# Patient Record
Sex: MALE | Race: WHITE | NOT HISPANIC OR LATINO | Employment: OTHER | URBAN - METROPOLITAN AREA
[De-identification: names, ages, dates, MRNs, and addresses within clinical notes are randomized per-mention and may not be internally consistent; named-entity substitution may affect disease eponyms.]

---

## 2018-04-06 ENCOUNTER — OFFICE VISIT (OUTPATIENT)
Dept: INTERNAL MEDICINE | Facility: CLINIC | Age: 83
End: 2018-04-06
Payer: MEDICARE

## 2018-04-06 VITALS
HEIGHT: 73 IN | HEART RATE: 74 BPM | OXYGEN SATURATION: 97 % | SYSTOLIC BLOOD PRESSURE: 130 MMHG | BODY MASS INDEX: 22.67 KG/M2 | WEIGHT: 171.06 LBS | TEMPERATURE: 98 F | DIASTOLIC BLOOD PRESSURE: 70 MMHG

## 2018-04-06 DIAGNOSIS — J06.9 VIRAL URI WITH COUGH: ICD-10-CM

## 2018-04-06 DIAGNOSIS — Z20.828 EXPOSURE TO INFLUENZA: Primary | ICD-10-CM

## 2018-04-06 DIAGNOSIS — J02.9 PHARYNGITIS, UNSPECIFIED ETIOLOGY: ICD-10-CM

## 2018-04-06 LAB
CTP QC/QA: YES
CTP QC/QA: YES
FLUAV AG NPH QL: NEGATIVE
FLUBV AG NPH QL: NEGATIVE
S PYO RRNA THROAT QL PROBE: NEGATIVE

## 2018-04-06 PROCEDURE — 99999 PR PBB SHADOW E&M-EST. PATIENT-LVL III: CPT | Mod: PBBFAC,,,

## 2018-04-06 PROCEDURE — 96372 THER/PROPH/DIAG INJ SC/IM: CPT | Mod: S$GLB,,, | Performed by: FAMILY MEDICINE

## 2018-04-06 PROCEDURE — 87804 INFLUENZA ASSAY W/OPTIC: CPT | Mod: 59,QW,S$GLB,

## 2018-04-06 PROCEDURE — 87880 STREP A ASSAY W/OPTIC: CPT | Mod: QW,S$GLB,,

## 2018-04-06 PROCEDURE — 99213 OFFICE O/P EST LOW 20 MIN: CPT | Mod: 25,S$GLB,,

## 2018-04-06 RX ORDER — BETAMETHASONE SODIUM PHOSPHATE AND BETAMETHASONE ACETATE 3; 3 MG/ML; MG/ML
6 INJECTION, SUSPENSION INTRA-ARTICULAR; INTRALESIONAL; INTRAMUSCULAR; SOFT TISSUE
Status: COMPLETED | OUTPATIENT
Start: 2018-04-06 | End: 2018-04-06

## 2018-04-06 RX ORDER — WARFARIN SODIUM 5 MG/1
TABLET ORAL
COMMUNITY
Start: 2017-04-20 | End: 2021-11-03

## 2018-04-06 RX ORDER — BENZONATATE 200 MG/1
200 CAPSULE ORAL 3 TIMES DAILY PRN
Qty: 30 CAPSULE | Refills: 0 | Status: SHIPPED | OUTPATIENT
Start: 2018-04-06 | End: 2018-04-16

## 2018-04-06 RX ORDER — CYCLOSPORINE 0.5 MG/ML
EMULSION OPHTHALMIC
COMMUNITY
Start: 2018-02-23 | End: 2018-05-23

## 2018-04-06 RX ADMIN — BETAMETHASONE SODIUM PHOSPHATE AND BETAMETHASONE ACETATE 6 MG: 3; 3 INJECTION, SUSPENSION INTRA-ARTICULAR; INTRALESIONAL; INTRAMUSCULAR; SOFT TISSUE at 10:04

## 2018-04-06 NOTE — PROGRESS NOTES
Subjective:       Patient ID: Claude L Taylor is a 89 y.o. male.    Chief Complaint: Cough and Sore Throat    HPI   Patient presents today with a three-day complaint of a sore throat that he says has almost essentially resolved.  He says that he awaken 3 days ago with a constant moderately intensity burning sensation back of his throat.  He started taking some Amoxil that he had at the house and says that her throat has cleared now.  He voices now some continued nasal congestion and rhinorrhea.  He voices associated burning eyes and a mild intermittent cough that is worse during the day.  He denies any shortness of breath.  He cannot identify exacerbating or mitigating factors.    Review of Systems   Constitutional: Negative for activity change, appetite change, fatigue, fever and unexpected weight change.   HENT: Positive for congestion, rhinorrhea and sore throat. Negative for postnasal drip.    Eyes: Positive for pain.   Respiratory: Positive for cough. Negative for chest tightness, shortness of breath and wheezing.    Cardiovascular: Negative for chest pain, palpitations and leg swelling.   Gastrointestinal: Negative for constipation, diarrhea, nausea and vomiting.   Endocrine: Negative.    Genitourinary: Negative.    Musculoskeletal: Negative.    Skin: Negative for color change.   Allergic/Immunologic: Negative.    Neurological: Negative for dizziness, weakness and light-headedness.   Hematological: Negative.    Psychiatric/Behavioral: Negative for sleep disturbance.         Objective:      Physical Exam   Constitutional: He is oriented to person, place, and time. He appears well-developed and well-nourished. No distress.   HENT:   Head: Normocephalic and atraumatic. Hair is normal.   Right Ear: Hearing, tympanic membrane, external ear and ear canal normal.   Left Ear: Hearing, tympanic membrane, external ear and ear canal normal.   Nose: No mucosal edema, rhinorrhea, nose lacerations, sinus tenderness, nasal  deformity, septal deviation or nasal septal hematoma. No epistaxis.  No foreign bodies. Right sinus exhibits no maxillary sinus tenderness and no frontal sinus tenderness. Left sinus exhibits no maxillary sinus tenderness and no frontal sinus tenderness.   Mouth/Throat: Uvula is midline, oropharynx is clear and moist and mucous membranes are normal.   Eyes: Conjunctivae are normal. Pupils are equal, round, and reactive to light. Right eye exhibits no discharge. Left eye exhibits no discharge.   Neck: Trachea normal, normal range of motion and phonation normal. Neck supple. No tracheal deviation present.   Cardiovascular: Normal rate, regular rhythm and intact distal pulses.  Exam reveals no gallop and no friction rub.    Murmur heard.   Systolic murmur is present with a grade of 2/6   Pulmonary/Chest: Effort normal. No respiratory distress. He has no decreased breath sounds. He has wheezes (mild) in the right upper field, the right middle field, the right lower field, the left upper field, the left middle field and the left lower field. He has no rhonchi. He has no rales. He exhibits no tenderness.   Musculoskeletal: Normal range of motion.   Lymphadenopathy:        Head (right side): No submental, no submandibular, no tonsillar, no preauricular, no posterior auricular and no occipital adenopathy present.        Head (left side): No submental, no submandibular, no tonsillar, no preauricular, no posterior auricular and no occipital adenopathy present.     He has no cervical adenopathy.        Right cervical: No superficial cervical, no deep cervical and no posterior cervical adenopathy present.       Left cervical: No superficial cervical, no deep cervical and no posterior cervical adenopathy present.   Neurological: He is alert and oriented to person, place, and time. He exhibits normal muscle tone. GCS eye subscore is 4. GCS verbal subscore is 5. GCS motor subscore is 6.   Skin: Skin is warm and dry. No rash noted.  He is not diaphoretic. No erythema. No pallor.   Psychiatric: He has a normal mood and affect. His speech is normal and behavior is normal. Judgment and thought content normal.       Assessment:       1. Exposure to influenza    2. Pharyngitis, unspecified etiology    3. Viral URI with cough          Plan:   Exposure to influenza  -     POCT Influenza A/B    Pharyngitis, unspecified etiology  -     POCT rapid strep A    Viral URI with cough  -     betamethasone acetate-betamethasone sodium phosphate injection 6 mg; Inject 1 mL (6 mg total) into the muscle one time.  -     benzonatate (TESSALON) 200 MG capsule; Take 1 capsule (200 mg total) by mouth 3 (three) times daily as needed.  Dispense: 30 capsule; Refill: 0            Disclaimer: This note is prepared using voice recognition software.

## 2018-04-24 ENCOUNTER — TELEPHONE (OUTPATIENT)
Dept: INTERNAL MEDICINE | Facility: CLINIC | Age: 83
End: 2018-04-24

## 2018-04-24 NOTE — TELEPHONE ENCOUNTER
----- Message from Chelsi Stahl sent at 4/24/2018  8:14 AM CDT -----  Contact: spouse  She's calling in regards to pt being worked into the schedule on today 4/24 she stated the pt isn't feeling well pls call back at [phone] (home)

## 2018-04-24 NOTE — TELEPHONE ENCOUNTER
----- Message from Marce Massey sent at 4/24/2018  7:16 AM CDT -----  Contact: wife  Pt is not feeling well.  She states Dr Romo has agreed to be pt's PCP.  Wife was offered a NP appt but refused.  Only wants to see Dr Romo today.

## 2018-04-24 NOTE — TELEPHONE ENCOUNTER
Called and spoke with pts wife. Explained that I do not have any openings today. That he is a new patient to us and I can get him scheduled but as of right now first opening is in June. Scheduled appt for him. Offered to schedule him with someone else today, to check other locations or even with his PCP. She declined. She stated that once she talks to him if they decide to try and see someone else they will call back.

## 2018-05-23 ENCOUNTER — OFFICE VISIT (OUTPATIENT)
Dept: INTERNAL MEDICINE | Facility: CLINIC | Age: 83
End: 2018-05-23
Payer: MEDICARE

## 2018-05-23 VITALS
TEMPERATURE: 98 F | BODY MASS INDEX: 22.21 KG/M2 | HEIGHT: 73 IN | DIASTOLIC BLOOD PRESSURE: 68 MMHG | HEART RATE: 60 BPM | SYSTOLIC BLOOD PRESSURE: 120 MMHG | WEIGHT: 167.56 LBS | OXYGEN SATURATION: 98 %

## 2018-05-23 DIAGNOSIS — N32.81 OAB (OVERACTIVE BLADDER): ICD-10-CM

## 2018-05-23 DIAGNOSIS — I70.0 ATHEROSCLEROSIS OF AORTA: ICD-10-CM

## 2018-05-23 DIAGNOSIS — M62.81 PROXIMAL MUSCLE WEAKNESS: ICD-10-CM

## 2018-05-23 DIAGNOSIS — M54.9 CHRONIC MIDLINE BACK PAIN, UNSPECIFIED BACK LOCATION: ICD-10-CM

## 2018-05-23 DIAGNOSIS — Z95.1 HX OF CABG: ICD-10-CM

## 2018-05-23 DIAGNOSIS — Z00.00 ROUTINE GENERAL MEDICAL EXAMINATION AT A HEALTH CARE FACILITY: ICD-10-CM

## 2018-05-23 DIAGNOSIS — M48.061 SPINAL STENOSIS OF LUMBAR REGION AT MULTIPLE LEVELS: Primary | ICD-10-CM

## 2018-05-23 DIAGNOSIS — K59.09 CHRONIC CONSTIPATION: ICD-10-CM

## 2018-05-23 DIAGNOSIS — M51.37 DDD (DEGENERATIVE DISC DISEASE), LUMBOSACRAL: ICD-10-CM

## 2018-05-23 DIAGNOSIS — G89.29 CHRONIC MIDLINE BACK PAIN, UNSPECIFIED BACK LOCATION: ICD-10-CM

## 2018-05-23 DIAGNOSIS — Z12.5 PROSTATE CANCER SCREENING: ICD-10-CM

## 2018-05-23 PROBLEM — M51.379 DDD (DEGENERATIVE DISC DISEASE), LUMBOSACRAL: Status: ACTIVE | Noted: 2018-05-23

## 2018-05-23 LAB
BILIRUB UR QL STRIP: NEGATIVE
CLARITY UR REFRACT.AUTO: CLEAR
COLOR UR AUTO: YELLOW
GLUCOSE UR QL STRIP: NEGATIVE
HGB UR QL STRIP: NEGATIVE
KETONES UR QL STRIP: NEGATIVE
LEUKOCYTE ESTERASE UR QL STRIP: NEGATIVE
NITRITE UR QL STRIP: NEGATIVE
PH UR STRIP: 5 [PH] (ref 5–8)
PROT UR QL STRIP: NEGATIVE
SP GR UR STRIP: 1.02 (ref 1–1.03)
URN SPEC COLLECT METH UR: NORMAL
UROBILINOGEN UR STRIP-ACNC: 4 EU/DL

## 2018-05-23 PROCEDURE — 99999 PR PBB SHADOW E&M-EST. PATIENT-LVL IV: CPT | Mod: PBBFAC,,, | Performed by: FAMILY MEDICINE

## 2018-05-23 PROCEDURE — 81003 URINALYSIS AUTO W/O SCOPE: CPT

## 2018-05-23 PROCEDURE — 99397 PER PM REEVAL EST PAT 65+ YR: CPT | Mod: 25,S$GLB,, | Performed by: FAMILY MEDICINE

## 2018-05-23 PROCEDURE — 87086 URINE CULTURE/COLONY COUNT: CPT

## 2018-05-23 PROCEDURE — 99214 OFFICE O/P EST MOD 30 MIN: CPT | Mod: 25,S$GLB,, | Performed by: FAMILY MEDICINE

## 2018-05-23 PROCEDURE — 99499 UNLISTED E&M SERVICE: CPT | Mod: S$GLB,,, | Performed by: FAMILY MEDICINE

## 2018-05-23 RX ORDER — OXYBUTYNIN CHLORIDE 5 MG/1
5 TABLET ORAL DAILY
Qty: 90 TABLET | Refills: 3 | Status: SHIPPED | OUTPATIENT
Start: 2018-05-23 | End: 2019-03-27

## 2018-05-23 NOTE — PROGRESS NOTES
"Claude Taylor presented for a follow-up Medicare AWV today. The following components were reviewed and updated:    · Medical history  · Family History  · Social history  · Allergies and Current Medications  · Health Maintenance  · Patient Care Team:  Pt is changing pcps. Prior patient of Dr Lorenzo and then Dr. Stefania Corcoran at [facility]. Other specialists include:   Dr Morfin- derm  Dr Barrios- card  Dr Gorman- GI  Dr Parisi- urology  Dr Castelan- NSG/back/[facility]  Dr Corbin- morphine/back injections.     **See Completed Assessments for Annual Wellness visit with in the encounter summary    · Medicare wellness assessment:  ·   Depression screening  · 1. In the past 2 weeks  has the patient felt down, depressed or hopeless? No  · 2. Over the past 2 weeks as the patient felt little interest or pleasure in doing things?  No  ·  Functional Ability/ Safety Screening  · 1. Was the  Patient's timed up and go test unsteady or longer than 30 seconds? no  · 2. Has the patient needed help with transportation shopping preparing meals housework laundry medications are managing money?  no  · 3.  If the patient's home have rugs in the hallway, back grab bars in the bathroom, or poor lighting? No, not necessary per patient. Using motorized scooter all the time.   · 4.has there been any hearing difficulties?  Yes, has hearing aids.   · Advance Directives:  · 1. Patient does have a living will in place.     Vitals:    05/23/18 1144   BP: 120/68   Pulse: 60   Temp: 98 °F (36.7 °C)   SpO2: 98%   Weight: 76 kg (167 lb 8.8 oz)   Height: 6' 1" (1.854 m)     Body mass index is 22.11 kg/m².   ]      Medicare Annual Wellness Visit, Subsequent   Patient Active Problem List   Diagnosis    Depression    CAD (coronary artery disease)    Mitral valve replaced    Asthma in adult    Hematuria    Chronic anticoagulation    Osteoarthritis    Chronic back pain    Altered bowel habits    Anismus    Colon polyps    Diverticulosis of large intestine " without hemorrhage    Chronic constipation         Provided Claude with a 5-10 year written screening schedule and personal prevention plan. Recommendations were developed using the USPSTF age appropriate recommendations. Education, counseling, and referrals were provided as needed.  After Visit Summary printed and given to patient which includes a list of additional screenings\tests needed.    Health Maintenance   Topic Date Due    Influenza Vaccine  08/01/2018    Colonoscopy  12/14/2020    Lipid Panel  06/20/2021    TETANUS VACCINE  02/14/2023    Zoster Vaccine  Completed    Pneumococcal (65+)  Completed       Follow-up in about 5 weeks (around 6/27/2018), or if symptoms worsen or fail to improve, for f/u PT, and OAB meds.      Izzy Romo MD

## 2018-05-24 LAB — BACTERIA UR CULT: NO GROWTH

## 2018-06-15 ENCOUNTER — LAB VISIT (OUTPATIENT)
Dept: LAB | Facility: HOSPITAL | Age: 83
End: 2018-06-15
Attending: FAMILY MEDICINE
Payer: MEDICARE

## 2018-06-15 ENCOUNTER — PATIENT OUTREACH (OUTPATIENT)
Dept: ADMINISTRATIVE | Facility: HOSPITAL | Age: 83
End: 2018-06-15

## 2018-06-15 DIAGNOSIS — Z12.5 PROSTATE CANCER SCREENING: ICD-10-CM

## 2018-06-15 DIAGNOSIS — G89.29 CHRONIC MIDLINE BACK PAIN, UNSPECIFIED BACK LOCATION: ICD-10-CM

## 2018-06-15 DIAGNOSIS — M54.9 CHRONIC MIDLINE BACK PAIN, UNSPECIFIED BACK LOCATION: ICD-10-CM

## 2018-06-15 DIAGNOSIS — I70.0 ATHEROSCLEROSIS OF AORTA: ICD-10-CM

## 2018-06-15 DIAGNOSIS — K59.09 CHRONIC CONSTIPATION: ICD-10-CM

## 2018-06-15 DIAGNOSIS — N32.81 OAB (OVERACTIVE BLADDER): ICD-10-CM

## 2018-06-15 DIAGNOSIS — M62.81 PROXIMAL MUSCLE WEAKNESS: ICD-10-CM

## 2018-06-15 DIAGNOSIS — M48.061 SPINAL STENOSIS OF LUMBAR REGION AT MULTIPLE LEVELS: ICD-10-CM

## 2018-06-15 DIAGNOSIS — M51.37 DDD (DEGENERATIVE DISC DISEASE), LUMBOSACRAL: ICD-10-CM

## 2018-06-15 LAB
ALBUMIN SERPL BCP-MCNC: 3.6 G/DL
ALP SERPL-CCNC: 59 U/L
ALT SERPL W/O P-5'-P-CCNC: 11 U/L
ANION GAP SERPL CALC-SCNC: 6 MMOL/L
AST SERPL-CCNC: 21 U/L
BASOPHILS # BLD AUTO: 0.06 K/UL
BASOPHILS NFR BLD: 0.9 %
BILIRUB SERPL-MCNC: 2.1 MG/DL
BUN SERPL-MCNC: 10 MG/DL
CALCIUM SERPL-MCNC: 9 MG/DL
CHLORIDE SERPL-SCNC: 108 MMOL/L
CHOLEST SERPL-MCNC: 147 MG/DL
CHOLEST/HDLC SERPL: 2.7 {RATIO}
CO2 SERPL-SCNC: 26 MMOL/L
COMPLEXED PSA SERPL-MCNC: 2.3 NG/ML
CREAT SERPL-MCNC: 0.8 MG/DL
DIFFERENTIAL METHOD: ABNORMAL
EOSINOPHIL # BLD AUTO: 0.2 K/UL
EOSINOPHIL NFR BLD: 2.7 %
ERYTHROCYTE [DISTWIDTH] IN BLOOD BY AUTOMATED COUNT: 14.6 %
ERYTHROCYTE [SEDIMENTATION RATE] IN BLOOD BY WESTERGREN METHOD: 1 MM/HR
EST. GFR  (AFRICAN AMERICAN): >60 ML/MIN/1.73 M^2
EST. GFR  (NON AFRICAN AMERICAN): >60 ML/MIN/1.73 M^2
GLUCOSE SERPL-MCNC: 83 MG/DL
HCT VFR BLD AUTO: 38 %
HDLC SERPL-MCNC: 55 MG/DL
HDLC SERPL: 37.4 %
HGB BLD-MCNC: 12.1 G/DL
IMM GRANULOCYTES # BLD AUTO: 0.02 K/UL
IMM GRANULOCYTES NFR BLD AUTO: 0.3 %
LDLC SERPL CALC-MCNC: 82 MG/DL
LYMPHOCYTES # BLD AUTO: 1.7 K/UL
LYMPHOCYTES NFR BLD: 23.8 %
MCH RBC QN AUTO: 30.9 PG
MCHC RBC AUTO-ENTMCNC: 31.8 G/DL
MCV RBC AUTO: 97 FL
MONOCYTES # BLD AUTO: 0.7 K/UL
MONOCYTES NFR BLD: 9.2 %
NEUTROPHILS # BLD AUTO: 4.4 K/UL
NEUTROPHILS NFR BLD: 63.1 %
NONHDLC SERPL-MCNC: 92 MG/DL
NRBC BLD-RTO: 0 /100 WBC
PLATELET # BLD AUTO: 151 K/UL
PMV BLD AUTO: 11.2 FL
POTASSIUM SERPL-SCNC: 4.2 MMOL/L
PROT SERPL-MCNC: 6.3 G/DL
RBC # BLD AUTO: 3.92 M/UL
SODIUM SERPL-SCNC: 140 MMOL/L
TRIGL SERPL-MCNC: 50 MG/DL
TSH SERPL DL<=0.005 MIU/L-ACNC: 0.65 UIU/ML
WBC # BLD AUTO: 7.03 K/UL

## 2018-06-15 PROCEDURE — 80053 COMPREHEN METABOLIC PANEL: CPT

## 2018-06-15 PROCEDURE — 84153 ASSAY OF PSA TOTAL: CPT

## 2018-06-15 PROCEDURE — 80061 LIPID PANEL: CPT

## 2018-06-15 PROCEDURE — 84443 ASSAY THYROID STIM HORMONE: CPT

## 2018-06-15 PROCEDURE — 36415 COLL VENOUS BLD VENIPUNCTURE: CPT | Mod: PO

## 2018-06-15 PROCEDURE — 85025 COMPLETE CBC W/AUTO DIFF WBC: CPT

## 2018-06-15 PROCEDURE — 85651 RBC SED RATE NONAUTOMATED: CPT

## 2018-06-27 ENCOUNTER — OFFICE VISIT (OUTPATIENT)
Dept: INTERNAL MEDICINE | Facility: CLINIC | Age: 83
End: 2018-06-27
Payer: MEDICARE

## 2018-06-27 VITALS
TEMPERATURE: 98 F | HEIGHT: 73 IN | HEART RATE: 60 BPM | BODY MASS INDEX: 22.47 KG/M2 | WEIGHT: 169.56 LBS | SYSTOLIC BLOOD PRESSURE: 110 MMHG | OXYGEN SATURATION: 96 % | DIASTOLIC BLOOD PRESSURE: 60 MMHG

## 2018-06-27 DIAGNOSIS — G89.29 CHRONIC MIDLINE BACK PAIN, UNSPECIFIED BACK LOCATION: ICD-10-CM

## 2018-06-27 DIAGNOSIS — I70.0 ATHEROSCLEROSIS OF AORTA: ICD-10-CM

## 2018-06-27 DIAGNOSIS — D64.9 ANEMIA, UNSPECIFIED TYPE: ICD-10-CM

## 2018-06-27 DIAGNOSIS — M54.9 CHRONIC MIDLINE BACK PAIN, UNSPECIFIED BACK LOCATION: ICD-10-CM

## 2018-06-27 DIAGNOSIS — Z79.01 CHRONIC ANTICOAGULATION: ICD-10-CM

## 2018-06-27 DIAGNOSIS — M48.061 SPINAL STENOSIS OF LUMBAR REGION AT MULTIPLE LEVELS: Primary | ICD-10-CM

## 2018-06-27 DIAGNOSIS — Z95.2 MITRAL VALVE REPLACED: ICD-10-CM

## 2018-06-27 DIAGNOSIS — N32.81 OAB (OVERACTIVE BLADDER): ICD-10-CM

## 2018-06-27 DIAGNOSIS — M51.37 DDD (DEGENERATIVE DISC DISEASE), LUMBOSACRAL: ICD-10-CM

## 2018-06-27 DIAGNOSIS — E53.8 B12 DEFICIENCY: ICD-10-CM

## 2018-06-27 DIAGNOSIS — K57.30 DIVERTICULOSIS OF LARGE INTESTINE WITHOUT HEMORRHAGE: Chronic | ICD-10-CM

## 2018-06-27 PROCEDURE — 99999 PR PBB SHADOW E&M-EST. PATIENT-LVL III: CPT | Mod: PBBFAC,,, | Performed by: FAMILY MEDICINE

## 2018-06-27 PROCEDURE — 99215 OFFICE O/P EST HI 40 MIN: CPT | Mod: S$GLB,,, | Performed by: FAMILY MEDICINE

## 2018-06-27 PROCEDURE — 99499 UNLISTED E&M SERVICE: CPT | Mod: S$GLB,,, | Performed by: FAMILY MEDICINE

## 2018-06-27 RX ORDER — GABAPENTIN 100 MG/1
100 CAPSULE ORAL 3 TIMES DAILY
Qty: 90 CAPSULE | Refills: 11 | Status: SHIPPED | OUTPATIENT
Start: 2018-06-27 | End: 2018-09-26

## 2018-06-27 NOTE — PROGRESS NOTES
Subjective:      Patient ID: Claude L Taylor is a 89 y.o. male.    Chief Complaint: Follow-up (5w)    Disclaimer:  This note is prepared using voice recognition software and as such is likely to have errors and has not been proof read. Please contact me for questions.     Pt is coming in today for 5 week follow-up.  Since I last saw him he reports that he has been attending physical therapy.  He has done about 8 visits.  He seems to be feeling that he gets really tired the next day.  He has been doing this through [place].  He has known significant spinal stenosis of the lumbar spine.  He gets around mostly on a motorized scooter.  We did review his last CT spine that has been scanned into the media tab.  Reports after walking at times feels breathing will get heavy but if he rests it goes away.  He reports that his knees or his biggest problem after prolonged sitting.  If he is in a chair watching a game he will get some achiness and then needs to lay down.  If he lays down flat the pain remedial E goes away.  Upon review he has extensive issues at the L4-L5 S1 region.  This is where he complains mostly of his his wife would want him to try something to see if he could take the edge off.  In the past he has not done well with oxycodone or hydrocodone.  He did find that tramadol was very helpful either.  He would be willing to give a try to gabapentin.  We discussed dosing and with his age that we need to use something very mild.  He is willing to do this.  We also discussed the potential of him may be moving to weekly visits for physical therapy since he has about 8 before more left.  He would be willing to do this also.    He reports that his bladder symptoms are much better.  He was having an overactive bladder issue previously.  With the did trip and only at the 5 mg that has helped a good bit.  He is not urinating nearly as much and only gets up 1 time to urinate at nighttime.    He is also here to review his  labs.  He is now noted to be anemic.  This is new for him.  Previously on outside labs he had a H&H of 13 and 40 and most recently now 12 in 38.  He is on chronic Coumadin.  He sees outside cardiology.  He denies any bleeding but he does have known issues of polyps on a colonoscopy.  He denies any hemorrhoids.  He was not planning on repeating his colonoscopy because of his age however the last 1 was done about 3 years ago.    Occasionally does get some mild ankle swelling mainly with this socks but otherwise no new issues.    He has seen several eye doctors for his right eye and gets very frustrated and does not really have much to add except for that he is just frustrated that he can't seem to get the answers on his eye issues either.    He has also tried Tylenol heating pads without much improvement for his knee or back pain.    Dr Morfin- derm  Dr Barrios- card  Dr Gorman- GI  Dr Parisi- urology  Dr Castelan- NSG/back/[facility]  Dr Corbin- morphine/back injections.           Lab Results   Component Value Date    WBC 7.03 06/15/2018    HGB 12.1 (L) 06/15/2018    HCT 38.0 (L) 06/15/2018     06/15/2018    CHOL 147 06/15/2018    TRIG 50 06/15/2018    HDL 55 06/15/2018    ALT 11 06/15/2018    AST 21 06/15/2018     06/15/2018    K 4.2 06/15/2018     06/15/2018    CREATININE 0.8 06/15/2018    BUN 10 06/15/2018    CO2 26 06/15/2018    TSH 0.645 06/15/2018    PSA 2.3 06/15/2018    INR 1.0 12/14/2015       Review of Systems   Constitutional: Positive for activity change and fatigue. Negative for appetite change, fever and unexpected weight change.   Respiratory: Positive for shortness of breath. Negative for cough.    Cardiovascular: Positive for leg swelling. Negative for chest pain and palpitations.   Gastrointestinal: Negative for abdominal distention, abdominal pain, blood in stool and rectal pain.   Endocrine: Negative for polyuria.   Genitourinary: Negative for difficulty urinating, dysuria, frequency,  "hematuria and urgency.   Musculoskeletal: Positive for arthralgias, back pain and myalgias. Negative for neck pain and neck stiffness.   Neurological: Positive for weakness and numbness. Negative for dizziness and headaches.   Psychiatric/Behavioral: Positive for agitation. Negative for decreased concentration, dysphoric mood and sleep disturbance. The patient is not nervous/anxious.      Objective:     Vitals:    06/27/18 1139   BP: 110/60   Pulse: 60   Temp: 98.4 °F (36.9 °C)   SpO2: 96%   Weight: 76.9 kg (169 lb 8.5 oz)   Height: 6' 1" (1.854 m)     Physical Exam   Constitutional: He appears well-developed and well-nourished.   Cardiovascular: Normal rate and regular rhythm.    No murmur heard.  Pulmonary/Chest: Effort normal and breath sounds normal. He has no wheezes.   Musculoskeletal:        Right knee: He exhibits normal range of motion, no swelling and no effusion. Tenderness found. Patellar tendon tenderness noted.        Left knee: He exhibits normal range of motion, no swelling and no effusion. Tenderness found. Patellar tendon tenderness noted.        Lumbar back: He exhibits decreased range of motion, tenderness, bony tenderness and pain.   Psychiatric: His speech is normal. Thought content normal. His mood appears not anxious. His affect is blunt. His affect is not angry. He is aggressive. He is not agitated. Cognition and memory are normal. He does not exhibit a depressed mood.   Vitals reviewed.    Assessment:     1. Spinal stenosis of lumbar region at multiple levels    2. DDD (degenerative disc disease), lumbosacral    3. Anemia, unspecified type    4. B12 deficiency    5. OAB (overactive bladder)    6. Chronic anticoagulation    7. Mitral valve replaced    8. Chronic midline back pain, unspecified back location    9. Diverticulosis of large intestine without hemorrhage    10. Atherosclerosis of aorta      Plan:   Claude was seen today for follow-up.    Diagnoses and all orders for this " visit:    Spinal stenosis of lumbar region at multiple levels-noted reviewed prior CT lumbar spine films.  Has been attending physical therapy but not finding much benefit.  Believe his knee pain is more radicular now around L4 because it improves with lying flat.  Will give a trial to gabapentin 100 mg up to t.i.d. dosing.  Could increased further if necessary.  Would recommend possibly doing physical therapy now on a weekly basis instead.  Follow back up in 3 months.  -     gabapentin (NEURONTIN) 100 MG capsule; Take 1 capsule (100 mg total) by mouth 3 (three) times daily.    DDD (degenerative disc disease), lumbosacral- reviewed. CT lumbar films again with patient. Also reviewed PT and exercises and goals for patient.  At this time I do not believe he would benefit much from surgical procedures.  We need to maximize his mobility and his conditioning status.  For this reason I believe he would benefit still from doing at least 4 more sessions weekly for physical therapy.  He is willing to do so.    Anemia, unspecified type-new however patient is on chronic anticoagulation due to a mitral valve replacement and willing to repeat again in 3 months.  Also checking B12 levels as noted to be low in the past.  With known issues of colon polyps in the past as well but would like to hold off on doing any colonoscopies due to age.  -     Iron and TIBC; Future  -     CBC auto differential; Future  -     Vitamin B12; Future    B12 deficiency-noted in the past repeat and check B12 levels prior to next visit  -     Vitamin B12; Future    OAB (overactive bladder)-improving with ditropan at 5 mg at night continue    Chronic anticoagulation- on chronic coumadin, monitor with anemia.     Mitral valve replaced- - on chronic coumadin, monitor with anemia.  Chronic midline back pain, unspecified back location- trial of gabapentin.     Colon polyps-noted on prior colonoscopy but would like to hold off at this time.  Also with known  issues with diverticulosis.    Atherosclerosis of the aorta-patient does not want to do statin therapy based on his age.  Cholesterol numbers reviewed.    Follow-up in about 3 months (around 9/27/2018) for anemia, gabapentin.      Time spent: 45 minutes in face to face discussion concerning diagnosis, prognosis, review of lab and test results, benefits of treatment as well as management of disease, counseling of patient and coordination of care between various health care providers . Greater than half the time spent was used for coordination of care and counseling of patient.

## 2018-09-19 ENCOUNTER — LAB VISIT (OUTPATIENT)
Dept: LAB | Facility: HOSPITAL | Age: 83
End: 2018-09-19
Attending: FAMILY MEDICINE
Payer: MEDICARE

## 2018-09-19 DIAGNOSIS — E53.8 B12 DEFICIENCY: ICD-10-CM

## 2018-09-19 DIAGNOSIS — D64.9 ANEMIA, UNSPECIFIED TYPE: ICD-10-CM

## 2018-09-19 LAB
BASOPHILS # BLD AUTO: 0.08 K/UL
BASOPHILS NFR BLD: 1.4 %
DIFFERENTIAL METHOD: ABNORMAL
EOSINOPHIL # BLD AUTO: 0.4 K/UL
EOSINOPHIL NFR BLD: 6.9 %
ERYTHROCYTE [DISTWIDTH] IN BLOOD BY AUTOMATED COUNT: 14.7 %
HCT VFR BLD AUTO: 39.7 %
HGB BLD-MCNC: 12.6 G/DL
IMM GRANULOCYTES # BLD AUTO: 0.01 K/UL
IMM GRANULOCYTES NFR BLD AUTO: 0.2 %
IRON SERPL-MCNC: 64 UG/DL
LYMPHOCYTES # BLD AUTO: 1.6 K/UL
LYMPHOCYTES NFR BLD: 26.7 %
MCH RBC QN AUTO: 30.8 PG
MCHC RBC AUTO-ENTMCNC: 31.7 G/DL
MCV RBC AUTO: 97 FL
MONOCYTES # BLD AUTO: 0.7 K/UL
MONOCYTES NFR BLD: 11.7 %
NEUTROPHILS # BLD AUTO: 3.1 K/UL
NEUTROPHILS NFR BLD: 53.1 %
NRBC BLD-RTO: 0 /100 WBC
PLATELET # BLD AUTO: 188 K/UL
PMV BLD AUTO: 11.3 FL
RBC # BLD AUTO: 4.09 M/UL
SATURATED IRON: 20 %
TOTAL IRON BINDING CAPACITY: 327 UG/DL
TRANSFERRIN SERPL-MCNC: 221 MG/DL
VIT B12 SERPL-MCNC: 360 PG/ML
WBC # BLD AUTO: 5.81 K/UL

## 2018-09-19 PROCEDURE — 85025 COMPLETE CBC W/AUTO DIFF WBC: CPT

## 2018-09-19 PROCEDURE — 82607 VITAMIN B-12: CPT

## 2018-09-19 PROCEDURE — 83540 ASSAY OF IRON: CPT

## 2018-09-19 PROCEDURE — 36415 COLL VENOUS BLD VENIPUNCTURE: CPT | Mod: PO

## 2018-09-24 ENCOUNTER — TELEPHONE (OUTPATIENT)
Dept: INTERNAL MEDICINE | Facility: CLINIC | Age: 83
End: 2018-09-24

## 2018-09-24 NOTE — TELEPHONE ENCOUNTER
----- Message from Adriana Herron sent at 2018 11:02 AM CDT -----  Has an appt on wed with Dr Romo, has a  to attend, can come in at 1pm if ok, or 7 am, please call patient or spouse, tc

## 2018-09-26 ENCOUNTER — OFFICE VISIT (OUTPATIENT)
Dept: INTERNAL MEDICINE | Facility: CLINIC | Age: 83
End: 2018-09-26
Payer: MEDICARE

## 2018-09-26 VITALS
SYSTOLIC BLOOD PRESSURE: 150 MMHG | HEART RATE: 60 BPM | BODY MASS INDEX: 23.9 KG/M2 | TEMPERATURE: 98 F | DIASTOLIC BLOOD PRESSURE: 60 MMHG | WEIGHT: 180.31 LBS | HEIGHT: 73 IN

## 2018-09-26 DIAGNOSIS — R35.1 BENIGN PROSTATIC HYPERPLASIA WITH NOCTURIA: ICD-10-CM

## 2018-09-26 DIAGNOSIS — E78.5 HYPERLIPIDEMIA, UNSPECIFIED HYPERLIPIDEMIA TYPE: ICD-10-CM

## 2018-09-26 DIAGNOSIS — M51.37 DDD (DEGENERATIVE DISC DISEASE), LUMBOSACRAL: ICD-10-CM

## 2018-09-26 DIAGNOSIS — N32.81 OAB (OVERACTIVE BLADDER): ICD-10-CM

## 2018-09-26 DIAGNOSIS — M48.061 SPINAL STENOSIS OF LUMBAR REGION AT MULTIPLE LEVELS: ICD-10-CM

## 2018-09-26 DIAGNOSIS — D51.3 OTHER DIETARY VITAMIN B12 DEFICIENCY ANEMIA: Primary | ICD-10-CM

## 2018-09-26 DIAGNOSIS — N40.1 BENIGN PROSTATIC HYPERPLASIA WITH NOCTURIA: ICD-10-CM

## 2018-09-26 PROCEDURE — 99999 PR PBB SHADOW E&M-EST. PATIENT-LVL III: CPT | Mod: PBBFAC,,, | Performed by: FAMILY MEDICINE

## 2018-09-26 PROCEDURE — 1101F PT FALLS ASSESS-DOCD LE1/YR: CPT | Mod: CPTII,,, | Performed by: FAMILY MEDICINE

## 2018-09-26 PROCEDURE — 90662 IIV NO PRSV INCREASED AG IM: CPT | Mod: PBBFAC,PO

## 2018-09-26 PROCEDURE — 99213 OFFICE O/P EST LOW 20 MIN: CPT | Mod: PBBFAC,PO | Performed by: FAMILY MEDICINE

## 2018-09-26 PROCEDURE — 96372 THER/PROPH/DIAG INJ SC/IM: CPT | Mod: PBBFAC,PO

## 2018-09-26 PROCEDURE — 99214 OFFICE O/P EST MOD 30 MIN: CPT | Mod: S$PBB,,, | Performed by: FAMILY MEDICINE

## 2018-09-26 RX ORDER — TAMSULOSIN HYDROCHLORIDE 0.4 MG/1
0.4 CAPSULE ORAL NIGHTLY
Qty: 90 CAPSULE | Refills: 3 | Status: SHIPPED | OUTPATIENT
Start: 2018-09-26 | End: 2019-03-27

## 2018-09-26 RX ORDER — PENICILLIN V POTASSIUM 500 MG/1
TABLET, FILM COATED ORAL
COMMUNITY
Start: 2018-09-05 | End: 2018-09-26

## 2018-09-26 RX ORDER — CYANOCOBALAMIN 1000 UG/ML
1000 INJECTION, SOLUTION INTRAMUSCULAR; SUBCUTANEOUS ONCE
Status: COMPLETED | OUTPATIENT
Start: 2018-09-26 | End: 2018-09-26

## 2018-09-26 RX ORDER — GABAPENTIN 100 MG/1
200 CAPSULE ORAL 3 TIMES DAILY
Qty: 540 CAPSULE | Refills: 3 | Status: SHIPPED | OUTPATIENT
Start: 2018-09-26 | End: 2019-08-27

## 2018-09-26 RX ADMIN — CYANOCOBALAMIN 1000 MCG: 1000 INJECTION, SOLUTION INTRAMUSCULAR at 03:09

## 2018-09-26 NOTE — PROGRESS NOTES
Subjective:      Patient ID: Claude L Taylor is a 89 y.o. male.    Chief Complaint: Follow-up (3m)    Disclaimer:  This note is prepared using voice recognition software and as such is likely to have errors and has not been proof read. Please contact me for questions.     Pt is coming in today for 12 week follow-up.  Since I last saw him he reports that he has been actually doing better.  He is now on gabapentin at 100 mg 3 times a day.  He feels like it is helping with his pain a good bit however yesterday he missed his afternoon evening dose so he ended up taking 2 gabapentin for total dose of 200 mg at night along with 2 Tylenol.  He states he only woke up 1 time and slipped the longest he has ever slipped and fell good the next morning.  Willing to increase his dose further.  Did go ahead and see Dr. weller heart his back specialist who referred him to Dr. Fagan is drawn and recently did a nerve procedure looking at possibly doing ablation but it was no better so he is going back again next Wednesday to do the injection at a little bit lower area.  His ultimate goal to be able to stand up without fatigue or tiredness.  He does like the gabapentin and is interested in possibly increasing the dose further.    He has known significant spinal stenosis of the lumbar spine.  He gets around mostly on a motorized scooter.  We did review his last CT spine that has been scanned into the media tab.    Upon review he has extensive issues at the L4-L5 S1 region.  This is where he complains mostly of his his wife would want him to try something to see if he could take the edge off.  In the past he has not done well with oxycodone or hydrocodone.  He did not  find that tramadol was very helpful either.      He reports that his bladder symptoms are much better.  He was having an overactive bladder issue previously.  Doing ditropan at the IR a.m. dose of 5 mg that has helped a good bit.  He is not urinating nearly as much and  only gets up 1 time to urinate at nighttime.  He does report 0 however most recently that at nighttime he seems to not be able to empty his bladder very much.  PSA levels were normal for his age on his last check back in June.  Would be willing to try low-dose Flomax to see if this would be beneficial.    Is still anemic but slightly better.  H&H has improved from 12-12.6.  He is on chronic Coumadin.  Is currently taking B12 supplementation orally but levels are still on the lower in in the 360s.  Would be willing to do a B12 shot today.  We also discussed the potential of doing a sublingual or liquid B12 due to his age and most likely inability to absorb this through his GI tract.  He is willing to do this.  No hemorrhoids.    Willing to take a flu shot today.    Reports that he had a person from [facility] yesterday do an exam on him and his blood pressure was 110/60.      Dr Morfin- derm  Dr Barrios- card  Dr Gorman- GI  Dr Parisi- urology  Dr Castelan- NSG/back/[facility]  Dr Corbin- morphine/back injections.     Dr Le- ortho back  Dr Erwin Marsh- back interventionalist          Lab Results   Component Value Date    WBC 5.81 09/19/2018    HGB 12.6 (L) 09/19/2018    HCT 39.7 (L) 09/19/2018     09/19/2018    CHOL 147 06/15/2018    TRIG 50 06/15/2018    HDL 55 06/15/2018    ALT 11 06/15/2018    AST 21 06/15/2018     06/15/2018    K 4.2 06/15/2018     06/15/2018    CREATININE 0.8 06/15/2018    BUN 10 06/15/2018    CO2 26 06/15/2018    TSH 0.645 06/15/2018    PSA 2.3 06/15/2018    INR 1.0 12/14/2015       Review of Systems   Constitutional: Positive for activity change and fatigue. Negative for appetite change and unexpected weight change.   Respiratory: Negative for cough and shortness of breath.    Cardiovascular: Negative for chest pain and palpitations.   Genitourinary: Positive for difficulty urinating. Negative for decreased urine volume, dysuria and urgency.   Musculoskeletal: Positive for  "arthralgias, back pain and gait problem. Negative for myalgias.   Neurological: Negative for light-headedness and headaches.   Psychiatric/Behavioral: Positive for dysphoric mood and sleep disturbance. Negative for decreased concentration. The patient is not nervous/anxious.      Objective:     Vitals:    09/26/18 1423   BP: (!) 150/60   Pulse: 60   Temp: 98 °F (36.7 °C)   Weight: 81.8 kg (180 lb 5.4 oz)   Height: 6' 1" (1.854 m)     Physical Exam   Constitutional: He appears well-developed and well-nourished.   HENT:   Head: Normocephalic and atraumatic.   Cardiovascular: Normal rate, regular rhythm and normal heart sounds.   Pulmonary/Chest: Effort normal and breath sounds normal.     Assessment:     1. Other dietary vitamin B12 deficiency anemia    2. Spinal stenosis of lumbar region at multiple levels    3. Benign prostatic hyperplasia with nocturia    4. DDD (degenerative disc disease), lumbosacral    5. OAB (overactive bladder)    6. Hyperlipidemia, unspecified hyperlipidemia type      Plan:   Claude was seen today for follow-up.    Diagnoses and all orders for this visit:    Other dietary vitamin B12 deficiency anemia-new problem will give B12 injections today x1 and then start sublingual B12 1000 mcg daily.  If not improving after 6 months then recommend possibly doing injections on a more regular basis.  -     CBC auto differential; Future  -     Comprehensive metabolic panel; Future  -     Iron and TIBC; Future  -     Vitamin B12; Future  -     TSH; Future  -     T4, free; Future  -     Lipid panel; Future    Spinal stenosis of lumbar region at multiple levels-seeing some improvement with gabapentin at 100 mg t.i.d. will increase to 200 mg t.i.d..  Can take Tylenol as well at night to help with the discomfort  -     gabapentin (NEURONTIN) 100 MG capsule; Take 2 capsules (200 mg total) by mouth 3 (three) times daily.  -     CBC auto differential; Future  -     Comprehensive metabolic panel; Future  -     " Iron and TIBC; Future  -     Vitamin B12; Future  -     TSH; Future  -     T4, free; Future  -     Lipid panel; Future    Benign prostatic hyperplasia with nocturia-improving some with did trip and however now needing nighttime dosing will add Flomax at night.  -     CBC auto differential; Future  -     Comprehensive metabolic panel; Future  -     Iron and TIBC; Future  -     Vitamin B12; Future  -     TSH; Future  -     T4, free; Future  -     Lipid panel; Future    DDD (degenerative disc disease), lumbosacral-increasing gabapentin to 200 mg t.i.d. continue with specialist for injections  -     CBC auto differential; Future  -     Comprehensive metabolic panel; Future  -     Iron and TIBC; Future  -     Vitamin B12; Future  -     TSH; Future  -     T4, free; Future  -     Lipid panel; Future    OAB (overactive bladder)-improved with low-dose Ditropan 5 mg IR in the a.m. add Flomax at nighttime  -     CBC auto differential; Future  -     Comprehensive metabolic panel; Future  -     Iron and TIBC; Future  -     Vitamin B12; Future  -     TSH; Future  -     T4, free; Future  -     Lipid panel; Future    Hyperlipidemia, unspecified hyperlipidemia type-Code for labs prior to next visit  -     TSH; Future  -     T4, free; Future  -     Lipid panel; Future    Other orders  -     cyanocobalamin injection 1,000 mcg; Inject 1 mL (1,000 mcg total) into the skin once.  -     cyanocobalamin, vitamin B-12, 1,000 mcg/mL Drop; Place 1,000 mcg under the tongue once daily.  -     tamsulosin (FLOMAX) 0.4 mg Cap; Take 1 capsule (0.4 mg total) by mouth every evening. For prostate  -     Influenza - High Dose (65+) (PF) (IM)            Follow-up in about 6 months (around 3/26/2019) for labs, b12, back meds. .

## 2018-10-12 ENCOUNTER — HOSPITAL ENCOUNTER (OUTPATIENT)
Dept: RADIOLOGY | Facility: HOSPITAL | Age: 83
Discharge: HOME | End: 2018-10-12
Attending: PHYSICAL MEDICINE & REHABILITATION
Payer: MEDICARE

## 2018-10-12 DIAGNOSIS — M25.551 RIGHT HIP PAIN: ICD-10-CM

## 2018-10-12 PROCEDURE — 73521 X-RAY EXAM HIPS BI 2 VIEWS: CPT | Mod: TC,FY,PO

## 2018-10-12 PROCEDURE — 73521 X-RAY EXAM HIPS BI 2 VIEWS: CPT | Mod: 26,,, | Performed by: RADIOLOGY

## 2018-10-19 ENCOUNTER — TELEPHONE (OUTPATIENT)
Dept: PHARMACY | Facility: CLINIC | Age: 83
End: 2018-10-19

## 2018-10-19 NOTE — TELEPHONE ENCOUNTER
Patient was inquiring to see if her  qualifies for program.  Patient is on all generics.  There is no programs available.

## 2018-11-02 ENCOUNTER — TELEPHONE (OUTPATIENT)
Dept: INTERNAL MEDICINE | Facility: CLINIC | Age: 83
End: 2018-11-02

## 2018-11-09 ENCOUNTER — TELEPHONE (OUTPATIENT)
Dept: INTERNAL MEDICINE | Facility: CLINIC | Age: 83
End: 2018-11-09

## 2018-11-09 NOTE — TELEPHONE ENCOUNTER
I returned call to wife and she states that patient is in pain management and he is disappointed he was to have back inj and was called the day before to be told [facility]. She then went on to say he is showing signs of damentia and he is deppressed with having so much pain. I offered kate and wife afshin that patient is only wanting to see  I advised her of Dr. Romo first work in [place] and that Dr. Romo is out several; days however I can get him in with kate eubanks who specializes in internal medication and works directly with Dr. Romo and consults with Dr. Romo but wife states that she is going to wait for matt with . I did put patient on wait list.aa

## 2018-12-03 ENCOUNTER — OFFICE VISIT (OUTPATIENT)
Dept: INTERNAL MEDICINE | Facility: CLINIC | Age: 83
End: 2018-12-03
Payer: MEDICARE

## 2018-12-03 VITALS
WEIGHT: 173.94 LBS | SYSTOLIC BLOOD PRESSURE: 130 MMHG | DIASTOLIC BLOOD PRESSURE: 70 MMHG | HEIGHT: 73 IN | BODY MASS INDEX: 23.05 KG/M2 | HEART RATE: 62 BPM | TEMPERATURE: 96 F

## 2018-12-03 DIAGNOSIS — M48.061 SPINAL STENOSIS OF LUMBAR REGION AT MULTIPLE LEVELS: Primary | ICD-10-CM

## 2018-12-03 DIAGNOSIS — R41.3 MEMORY LOSS: ICD-10-CM

## 2018-12-03 DIAGNOSIS — M19.90 OSTEOARTHRITIS, UNSPECIFIED OSTEOARTHRITIS TYPE, UNSPECIFIED SITE: ICD-10-CM

## 2018-12-03 DIAGNOSIS — F32.A DEPRESSION, UNSPECIFIED DEPRESSION TYPE: ICD-10-CM

## 2018-12-03 DIAGNOSIS — M54.9 CHRONIC MIDLINE BACK PAIN, UNSPECIFIED BACK LOCATION: ICD-10-CM

## 2018-12-03 DIAGNOSIS — R06.02 SOB (SHORTNESS OF BREATH) ON EXERTION: ICD-10-CM

## 2018-12-03 DIAGNOSIS — G89.29 CHRONIC MIDLINE BACK PAIN, UNSPECIFIED BACK LOCATION: ICD-10-CM

## 2018-12-03 DIAGNOSIS — N32.81 OAB (OVERACTIVE BLADDER): ICD-10-CM

## 2018-12-03 PROCEDURE — 99999 PR PBB SHADOW E&M-EST. PATIENT-LVL III: CPT | Mod: PBBFAC,,, | Performed by: FAMILY MEDICINE

## 2018-12-03 PROCEDURE — 1101F PT FALLS ASSESS-DOCD LE1/YR: CPT | Mod: CPTII,S$GLB,, | Performed by: FAMILY MEDICINE

## 2018-12-03 PROCEDURE — 99214 OFFICE O/P EST MOD 30 MIN: CPT | Mod: S$GLB,,, | Performed by: FAMILY MEDICINE

## 2018-12-03 NOTE — PROGRESS NOTES
Subjective:      Patient ID: Claude L Taylor is a 89 y.o. male.    Chief Complaint: Depression and Memory Loss    Disclaimer:  This note is prepared using voice recognition software and as such is likely to have errors and has not been proof read. Please contact me for questions.     Pt is coming in today for memory issues, and follow-up of back pain procedure being denied.  Since I last saw him he reports that he has been denied to have nerve ablation in lumbar spine by Dr Bran Marsh from [employer].  Is reported in his denial statement that based on his current conditions and demographics that it would be considered an investigational procedure.  It was reported based on their documentation that it is only considered investigational however I did advise the patient to appeal the procedure because of 1 his age and most of the research studies to not take place in individuals over 85, 2.  To get in touch with his pain specialist to see if there is an alternative Medicare Advantage plan that would have this procedure approved, 3..  Since he had so much benefit previously from the other injections that he may want to consider doing the appeal or even looking into other options.  He reports a lot of significant improvement with leg pain since starting the gabapentin.  He was really hoping to do the nerve ablation procedure.    Reports issues with short term memory. Was interested in trying a supplement that he read in paper called prixelin. Did google DS Digitale Seiten and appears it is just a vitamin.  Appears to be also somewhat of a scam as well.  Discussed with the patient though in the setting a lot of his issues have started since he has been on the gabapentin but is provided him a lot of relief from his leg discomfort.  We did discuss the mechanism of action of the gabapentin and how this may slow down some of his Neurontin firing which may lead to some short-term memory issues.  At this point it seems to be the  only thing that is provided him relief for now and us unless he gets the nerve ablation procedure performed we could consider decreasing the dose again may be in the spring when his arthritis and symptoms are not as bad.  He is amenable to this plan.  Also his wife is encouraging of this as well.    His wife reports he is seems to be more depressed but it appears to be directly related to the inability to get the nerve ablation procedure performed as well as just being frustrated with the insurance and medical system altogether.  He has had issues in the past with being easily agitated and very forward and blunt.  He seems however very much so willing to be redirected and informed regarding certain processes.    Gets frustrated with not remembering names of friends and events. Suspect related to gabapentin but finding a lot of relief with gabapentin from legs. Now on 200mg tid.     He has known significant spinal stenosis of the lumbar spine.  He gets around mostly on a motorized scooter.      Upon review he has extensive issues at the L4-L5 S1 region.  This is where he complains mostly of his his wife would want him to try something to see if he could take the edge off.  In the past he has not done well with oxycodone or hydrocodone.  He did not  find that tramadol was very helpful either.      He reports that his bladder symptoms are much better.  He was having an overactive bladder issue previously.  Doing ditropan at the IR a.m. dose of 5 mg that has helped a good bit.  He is not urinating nearly as much and only gets up 1 time to urinate at nighttime.  He does report 0 however most recently that at nighttime he seems to not be able to empty his bladder very much.  PSA levels were normal for his age on his last check back in June.  Now on  low-dose Flomax as well.     Can at times get a little anemic. A lot things he does causes him to breathe more intensely.  Has Dr Barrios as cardiologist. Last inr check within  ranges, but now on b12 liquid.  H&H has improved from 12-12.6.  He is on chronic Coumadin.      Dr Morfin- derm  Dr Barrios- card  Dr Gorman- GI  Dr Parisi- urology  Dr Castelan- NSG/back/[facility]  Dr Corbin- morphine/back injections.     Dr Le- ortho back  Dr Erwin Marsh- back interventionalist          Lab Results   Component Value Date    WBC 5.81 09/19/2018    HGB 12.6 (L) 09/19/2018    HCT 39.7 (L) 09/19/2018     09/19/2018    CHOL 147 06/15/2018    TRIG 50 06/15/2018    HDL 55 06/15/2018    ALT 11 06/15/2018    AST 21 06/15/2018     06/15/2018    K 4.2 06/15/2018     06/15/2018    CREATININE 0.8 06/15/2018    BUN 10 06/15/2018    CO2 26 06/15/2018    TSH 0.645 06/15/2018    PSA 2.3 06/15/2018    INR 1.0 12/14/2015       X-Ray Hips Bilateral 2 View Incl AP Pelvis  Narrative: EXAMINATION:  XR HIPS BILATERAL 2 VIEW INCL AP PELVIS    CLINICAL HISTORY:  Pain in right hip    TECHNIQUE:  AP view of the pelvis and frogleg lateral views of both hips were performed.    COMPARISON:  None.    FINDINGS:  The bony pelvis is intact. Both femoral heads are well seated within acetabula.  Mild degenerative changes associated with either hip.  Surgical clips seen overlying the left groin.  A stimulator device is seen overlying the right gluteal region with a lead projecting over the right side of the abdomen.  The right hip is limited evaluation due to overlying is stimulator device.  No definite plain film evidence to suggest AVN of either hip.  Impression: As above    Electronically signed by: Stephon Dowell DO  Date:    10/12/2018  Time:    13:16        Review of Systems   Constitutional: Positive for fatigue. Negative for activity change, appetite change and chills.   HENT: Negative for sore throat and trouble swallowing.    Respiratory: Negative for cough and shortness of breath.    Cardiovascular: Negative for chest pain and leg swelling.   Gastrointestinal: Negative for abdominal pain, constipation,  "diarrhea and nausea.   Genitourinary: Negative for difficulty urinating, dysuria and flank pain.   Musculoskeletal: Positive for arthralgias, back pain and gait problem. Negative for joint swelling.   Neurological: Positive for numbness. Negative for dizziness and headaches.   Psychiatric/Behavioral: Positive for agitation, decreased concentration and sleep disturbance. Negative for confusion and dysphoric mood.     Objective:     Vitals:    12/03/18 1120   BP: 130/70   Pulse: 62   Temp: 96 °F (35.6 °C)   Weight: 78.9 kg (173 lb 15.1 oz)   Height: 6' 1" (1.854 m)     Physical Exam   Constitutional: He appears well-developed and well-nourished.   HENT:   Head: Normocephalic and atraumatic.   Right Ear: Tympanic membrane normal.   Left Ear: Tympanic membrane normal.   Mouth/Throat: Oropharynx is clear and moist.   Eyes: Conjunctivae and EOM are normal.   Neck: Normal range of motion. Neck supple.   Cardiovascular: Normal rate and regular rhythm.   Pulmonary/Chest: Effort normal and breath sounds normal.   Psychiatric: His speech is normal and behavior is normal. Judgment and thought content normal. His affect is blunt. Cognition and memory are normal.   Nursing note and vitals reviewed.    Assessment:     1. Spinal stenosis of lumbar region at multiple levels    2. Chronic midline back pain, unspecified back location    3. Memory loss    4. SOB (shortness of breath) on exertion    5. Depression, unspecified depression type    6. Osteoarthritis, unspecified osteoarthritis type, unspecified site    7. OAB (overactive bladder)      Plan:   Claude was seen today for depression and memory loss.    Diagnoses and all orders for this visit:    Spinal stenosis of lumbar region at multiple levels- reviewed today the most recent office visit notes from his pain or potential is Dr. Marsh as well as the denial letter from [employer].  Recommend the patient try to appeal this mainly based on his age and his current symptoms and " the fact that he had 80% pain relief previously when he underwent a right S1-S2 S3 and S4 lb be on October 24th.  He may also look into changing insurances as well.  Continue at this time now with the gabapentin at 200 mg t.i.d. however if still continues to notice concentration or memory issues would recommend we consider at that time may be decreasing the dose back down to 100 mg t.i.d..  I would like to do this in the setting after he has done the ablation procedure if it ever gets approved.    Chronic midline back pain, unspecified back location-continue follow-up with pain interventionalist and specialist.    Memory loss-suspect more related to the increased dose in the gabapentin however he is finding more benefit from the medication then side effects.  Will continue to monitor it see back as scheduled in March and considered adjusting the dose at that time to lower level    SOB (shortness of breath) on exertion  Comments:  will have pt report to his cardiologist for further evaluation after pacemaker checkup on dec 14, sees Dr Driver. reviewed ECHO from 8/2018, also did recent INR testing.    Depression, unspecified depression type-at this time intolerant in the past medication treatment.  Explained and patient is okay with current regimen dosing    Osteoarthritis, unspecified osteoarthritis type, unspecified site continue with pain interventionalist    OAB (overactive bladder)-continue with IR Ditropan and Flomax            Follow-up if symptoms worsen or fail to improve.    There are no Patient Instructions on file for this visit.

## 2019-01-07 ENCOUNTER — OFFICE VISIT (OUTPATIENT)
Dept: INTERNAL MEDICINE | Facility: CLINIC | Age: 84
End: 2019-01-07
Payer: MEDICARE

## 2019-01-07 VITALS
WEIGHT: 180.13 LBS | RESPIRATION RATE: 16 BRPM | TEMPERATURE: 97 F | SYSTOLIC BLOOD PRESSURE: 132 MMHG | HEIGHT: 73 IN | DIASTOLIC BLOOD PRESSURE: 62 MMHG | HEART RATE: 62 BPM | BODY MASS INDEX: 23.87 KG/M2

## 2019-01-07 DIAGNOSIS — J32.9 SINUSITIS, UNSPECIFIED CHRONICITY, UNSPECIFIED LOCATION: Primary | ICD-10-CM

## 2019-01-07 DIAGNOSIS — R05.9 COUGH: ICD-10-CM

## 2019-01-07 PROCEDURE — 1101F PT FALLS ASSESS-DOCD LE1/YR: CPT | Mod: CPTII,S$GLB,, | Performed by: NURSE PRACTITIONER

## 2019-01-07 PROCEDURE — 1101F PR PT FALLS ASSESS DOC 0-1 FALLS W/OUT INJ PAST YR: ICD-10-PCS | Mod: CPTII,S$GLB,, | Performed by: NURSE PRACTITIONER

## 2019-01-07 PROCEDURE — 99999 PR PBB SHADOW E&M-EST. PATIENT-LVL III: CPT | Mod: PBBFAC,,, | Performed by: NURSE PRACTITIONER

## 2019-01-07 PROCEDURE — 99999 PR PBB SHADOW E&M-EST. PATIENT-LVL III: ICD-10-PCS | Mod: PBBFAC,,, | Performed by: NURSE PRACTITIONER

## 2019-01-07 PROCEDURE — 99214 PR OFFICE/OUTPT VISIT, EST, LEVL IV, 30-39 MIN: ICD-10-PCS | Mod: S$GLB,,, | Performed by: NURSE PRACTITIONER

## 2019-01-07 PROCEDURE — 99214 OFFICE O/P EST MOD 30 MIN: CPT | Mod: S$GLB,,, | Performed by: NURSE PRACTITIONER

## 2019-01-07 RX ORDER — GUAIFENESIN 600 MG/1
1200 TABLET, EXTENDED RELEASE ORAL 2 TIMES DAILY
Qty: 40 TABLET | Refills: 0 | Status: SHIPPED | OUTPATIENT
Start: 2019-01-07 | End: 2019-01-17

## 2019-01-07 RX ORDER — DOXYCYCLINE 100 MG/1
100 CAPSULE ORAL EVERY 12 HOURS
Qty: 20 CAPSULE | Refills: 0 | Status: SHIPPED | OUTPATIENT
Start: 2019-01-07 | End: 2019-01-17

## 2019-01-07 NOTE — PROGRESS NOTES
"Subjective:       Patient ID: Claude L Taylor is a 89 y.o. male.    Chief Complaint: URI    URI    This is a new problem. Episode onset: 3-5 days. The problem has been gradually worsening. There has been no fever. Associated symptoms include congestion, coughing, rhinorrhea, sneezing and a sore throat. Pertinent negatives include no abdominal pain, chest pain, diarrhea, dysuria, ear pain, headaches, joint pain, joint swelling, nausea, neck pain, plugged ear sensation, rash, sinus pain, swollen glands, vomiting or wheezing. Treatments tried: cough drop. The treatment provided no relief.       /62 (BP Location: Right arm, Patient Position: Sitting, BP Method: Medium (Automatic))   Pulse 62   Temp 97.3 °F (36.3 °C) (Oral)   Resp 16   Ht 6' 1" (1.854 m)   Wt 81.7 kg (180 lb 1.9 oz)   BMI 23.76 kg/m²     Review of Systems   Constitutional: Negative for activity change, appetite change, chills, diaphoresis, fatigue, fever and unexpected weight change.   HENT: Positive for congestion, postnasal drip, rhinorrhea, sneezing and sore throat. Negative for dental problem, drooling, ear discharge, ear pain, facial swelling, hearing loss, mouth sores, nosebleeds, sinus pressure, sinus pain, tinnitus, trouble swallowing and voice change.    Eyes: Negative for photophobia, pain, discharge, redness, itching and visual disturbance.   Respiratory: Positive for cough. Negative for apnea, choking, chest tightness, shortness of breath and wheezing.    Cardiovascular: Negative for chest pain, palpitations and leg swelling.   Gastrointestinal: Negative for abdominal distention, abdominal pain, anal bleeding, blood in stool, constipation, diarrhea, nausea, rectal pain and vomiting.   Endocrine: Negative.    Genitourinary: Negative for decreased urine volume, difficulty urinating, dysuria, hematuria and urgency.   Musculoskeletal: Negative for arthralgias, gait problem, joint pain, joint swelling, myalgias, neck pain and neck " stiffness.   Skin: Negative for color change, pallor, rash and wound.   Allergic/Immunologic: Negative for environmental allergies, food allergies and immunocompromised state.   Neurological: Negative for dizziness, tremors, seizures, syncope, facial asymmetry, speech difficulty, weakness, light-headedness, numbness and headaches.   Hematological: Negative for adenopathy. Bruises/bleeds easily.   Psychiatric/Behavioral: Negative for agitation, behavioral problems, confusion, decreased concentration, dysphoric mood, hallucinations and sleep disturbance. The patient is not nervous/anxious and is not hyperactive.        Objective:      Physical Exam   Constitutional: He is oriented to person, place, and time. He appears well-developed and well-nourished. No distress.   HENT:   Head: Normocephalic and atraumatic.   Right Ear: Tympanic membrane, external ear and ear canal normal. No decreased hearing is noted.   Left Ear: Tympanic membrane, external ear and ear canal normal. No decreased hearing is noted.   Nose: Mucosal edema and rhinorrhea present. No sinus tenderness. No epistaxis. Right sinus exhibits no maxillary sinus tenderness and no frontal sinus tenderness. Left sinus exhibits no maxillary sinus tenderness and no frontal sinus tenderness.   Mouth/Throat: Uvula is midline and mucous membranes are normal. Posterior oropharyngeal erythema present. No oropharyngeal exudate, posterior oropharyngeal edema or tonsillar abscesses.   Eyes: Conjunctivae are normal. Right eye exhibits no discharge. Left eye exhibits no discharge.   Neck: Normal range of motion.   Cardiovascular: Normal rate, regular rhythm and normal heart sounds.   No murmur heard.  Pulmonary/Chest: Effort normal. No accessory muscle usage. No respiratory distress. He has no decreased breath sounds. He has no wheezes. He has no rhonchi. He has no rales. He exhibits no tenderness.   Abdominal: Soft. There is no tenderness.   Musculoskeletal: Normal range  of motion. He exhibits no edema.   Neurological: He is alert and oriented to person, place, and time.   Skin: Skin is warm and dry. He is not diaphoretic. No erythema.   Psychiatric: He has a normal mood and affect. His behavior is normal.   Nursing note and vitals reviewed.      Assessment:       1. Sinusitis, unspecified chronicity, unspecified location    2. Cough        Plan:       Claude was seen today for uri.    Diagnoses and all orders for this visit:    Sinusitis, unspecified chronicity, unspecified location  -     doxycycline (VIBRAMYCIN) 100 MG Cap; Take 1 capsule (100 mg total) by mouth every 12 (twelve) hours. for 10 days    Cough  -     guaiFENesin (MUCINEX) 600 mg 12 hr tablet; Take 2 tablets (1,200 mg total) by mouth 2 (two) times daily. for 10 days    rest  Push fluids  If symptoms worsen or fail to improve with treatment, see your Primary Care Provider or go to the nearest Emergency Room.

## 2019-03-20 ENCOUNTER — LAB VISIT (OUTPATIENT)
Dept: LAB | Facility: HOSPITAL | Age: 84
End: 2019-03-20
Attending: FAMILY MEDICINE
Payer: MEDICARE

## 2019-03-20 DIAGNOSIS — N32.81 OAB (OVERACTIVE BLADDER): ICD-10-CM

## 2019-03-20 DIAGNOSIS — M51.37 DDD (DEGENERATIVE DISC DISEASE), LUMBOSACRAL: ICD-10-CM

## 2019-03-20 DIAGNOSIS — D51.3 OTHER DIETARY VITAMIN B12 DEFICIENCY ANEMIA: ICD-10-CM

## 2019-03-20 DIAGNOSIS — M48.061 SPINAL STENOSIS OF LUMBAR REGION AT MULTIPLE LEVELS: ICD-10-CM

## 2019-03-20 DIAGNOSIS — R35.1 BENIGN PROSTATIC HYPERPLASIA WITH NOCTURIA: ICD-10-CM

## 2019-03-20 DIAGNOSIS — N40.1 BENIGN PROSTATIC HYPERPLASIA WITH NOCTURIA: ICD-10-CM

## 2019-03-20 DIAGNOSIS — E78.5 HYPERLIPIDEMIA, UNSPECIFIED HYPERLIPIDEMIA TYPE: ICD-10-CM

## 2019-03-20 LAB
ALBUMIN SERPL BCP-MCNC: 3.5 G/DL
ALP SERPL-CCNC: 75 U/L
ALT SERPL W/O P-5'-P-CCNC: 11 U/L
ANION GAP SERPL CALC-SCNC: 9 MMOL/L
AST SERPL-CCNC: 22 U/L
BASOPHILS # BLD AUTO: 0.08 K/UL
BASOPHILS NFR BLD: 1.4 %
BILIRUB SERPL-MCNC: 1.6 MG/DL
BUN SERPL-MCNC: 15 MG/DL
CALCIUM SERPL-MCNC: 9.1 MG/DL
CHLORIDE SERPL-SCNC: 106 MMOL/L
CHOLEST SERPL-MCNC: 156 MG/DL
CHOLEST/HDLC SERPL: 2.9 {RATIO}
CO2 SERPL-SCNC: 24 MMOL/L
CREAT SERPL-MCNC: 0.8 MG/DL
DIFFERENTIAL METHOD: ABNORMAL
EOSINOPHIL # BLD AUTO: 0.4 K/UL
EOSINOPHIL NFR BLD: 7 %
ERYTHROCYTE [DISTWIDTH] IN BLOOD BY AUTOMATED COUNT: 14.1 %
EST. GFR  (AFRICAN AMERICAN): >60 ML/MIN/1.73 M^2
EST. GFR  (NON AFRICAN AMERICAN): >60 ML/MIN/1.73 M^2
GLUCOSE SERPL-MCNC: 75 MG/DL
HCT VFR BLD AUTO: 41.1 %
HDLC SERPL-MCNC: 53 MG/DL
HDLC SERPL: 34 %
HGB BLD-MCNC: 13.2 G/DL
IMM GRANULOCYTES # BLD AUTO: 0.02 K/UL
IMM GRANULOCYTES NFR BLD AUTO: 0.4 %
IRON SERPL-MCNC: 76 UG/DL
LDLC SERPL CALC-MCNC: 91.2 MG/DL
LYMPHOCYTES # BLD AUTO: 1.6 K/UL
LYMPHOCYTES NFR BLD: 28.2 %
MCH RBC QN AUTO: 30.5 PG
MCHC RBC AUTO-ENTMCNC: 32.1 G/DL
MCV RBC AUTO: 95 FL
MONOCYTES # BLD AUTO: 0.6 K/UL
MONOCYTES NFR BLD: 10.4 %
NEUTROPHILS # BLD AUTO: 2.9 K/UL
NEUTROPHILS NFR BLD: 52.6 %
NONHDLC SERPL-MCNC: 103 MG/DL
NRBC BLD-RTO: 0 /100 WBC
PLATELET # BLD AUTO: 218 K/UL
PMV BLD AUTO: 11 FL
POTASSIUM SERPL-SCNC: 4.7 MMOL/L
PROT SERPL-MCNC: 6.6 G/DL
RBC # BLD AUTO: 4.33 M/UL
SATURATED IRON: 23 %
SODIUM SERPL-SCNC: 139 MMOL/L
T4 FREE SERPL-MCNC: 1 NG/DL
TOTAL IRON BINDING CAPACITY: 330 UG/DL
TRANSFERRIN SERPL-MCNC: 223 MG/DL
TRIGL SERPL-MCNC: 59 MG/DL
TSH SERPL DL<=0.005 MIU/L-ACNC: 0.69 UIU/ML
VIT B12 SERPL-MCNC: 513 PG/ML
WBC # BLD AUTO: 5.57 K/UL

## 2019-03-20 PROCEDURE — 82607 VITAMIN B-12: CPT

## 2019-03-20 PROCEDURE — 36415 COLL VENOUS BLD VENIPUNCTURE: CPT | Mod: PO

## 2019-03-20 PROCEDURE — 83540 ASSAY OF IRON: CPT

## 2019-03-20 PROCEDURE — 84439 ASSAY OF FREE THYROXINE: CPT

## 2019-03-20 PROCEDURE — 85025 COMPLETE CBC W/AUTO DIFF WBC: CPT

## 2019-03-20 PROCEDURE — 80053 COMPREHEN METABOLIC PANEL: CPT

## 2019-03-20 PROCEDURE — 80061 LIPID PANEL: CPT

## 2019-03-20 PROCEDURE — 84443 ASSAY THYROID STIM HORMONE: CPT

## 2019-03-27 ENCOUNTER — OFFICE VISIT (OUTPATIENT)
Dept: INTERNAL MEDICINE | Facility: CLINIC | Age: 84
End: 2019-03-27
Payer: MEDICARE

## 2019-03-27 VITALS
DIASTOLIC BLOOD PRESSURE: 78 MMHG | WEIGHT: 176.13 LBS | TEMPERATURE: 97 F | HEART RATE: 64 BPM | HEIGHT: 72 IN | BODY MASS INDEX: 23.86 KG/M2 | SYSTOLIC BLOOD PRESSURE: 126 MMHG

## 2019-03-27 DIAGNOSIS — I70.0 ATHEROSCLEROSIS OF AORTA: ICD-10-CM

## 2019-03-27 DIAGNOSIS — R39.12 BENIGN PROSTATIC HYPERPLASIA WITH WEAK URINARY STREAM: ICD-10-CM

## 2019-03-27 DIAGNOSIS — R06.02 SOB (SHORTNESS OF BREATH) ON EXERTION: ICD-10-CM

## 2019-03-27 DIAGNOSIS — M19.90 OSTEOARTHRITIS, UNSPECIFIED OSTEOARTHRITIS TYPE, UNSPECIFIED SITE: ICD-10-CM

## 2019-03-27 DIAGNOSIS — M51.37 DDD (DEGENERATIVE DISC DISEASE), LUMBOSACRAL: ICD-10-CM

## 2019-03-27 DIAGNOSIS — Z79.01 CHRONIC ANTICOAGULATION: ICD-10-CM

## 2019-03-27 DIAGNOSIS — N40.1 BENIGN PROSTATIC HYPERPLASIA WITH WEAK URINARY STREAM: ICD-10-CM

## 2019-03-27 DIAGNOSIS — I48.20 ATRIAL FIBRILLATION, CHRONIC: ICD-10-CM

## 2019-03-27 DIAGNOSIS — Z95.2 MITRAL VALVE REPLACED: ICD-10-CM

## 2019-03-27 DIAGNOSIS — Z00.00 ROUTINE GENERAL MEDICAL EXAMINATION AT A HEALTH CARE FACILITY: Primary | ICD-10-CM

## 2019-03-27 DIAGNOSIS — Z12.5 PROSTATE CANCER SCREENING: ICD-10-CM

## 2019-03-27 DIAGNOSIS — E53.8 B12 DEFICIENCY: ICD-10-CM

## 2019-03-27 DIAGNOSIS — M54.9 CHRONIC MIDLINE BACK PAIN, UNSPECIFIED BACK LOCATION: ICD-10-CM

## 2019-03-27 DIAGNOSIS — M48.061 SPINAL STENOSIS OF LUMBAR REGION AT MULTIPLE LEVELS: ICD-10-CM

## 2019-03-27 DIAGNOSIS — N32.81 OAB (OVERACTIVE BLADDER): ICD-10-CM

## 2019-03-27 DIAGNOSIS — G89.29 CHRONIC MIDLINE BACK PAIN, UNSPECIFIED BACK LOCATION: ICD-10-CM

## 2019-03-27 DIAGNOSIS — Z95.1 HX OF CABG: ICD-10-CM

## 2019-03-27 PROCEDURE — 99214 OFFICE O/P EST MOD 30 MIN: CPT | Mod: S$GLB,,, | Performed by: FAMILY MEDICINE

## 2019-03-27 PROCEDURE — 99999 PR PBB SHADOW E&M-EST. PATIENT-LVL III: ICD-10-PCS | Mod: PBBFAC,,, | Performed by: FAMILY MEDICINE

## 2019-03-27 PROCEDURE — 99999 PR PBB SHADOW E&M-EST. PATIENT-LVL III: CPT | Mod: PBBFAC,,, | Performed by: FAMILY MEDICINE

## 2019-03-27 PROCEDURE — 99499 UNLISTED E&M SERVICE: CPT | Mod: S$GLB,,, | Performed by: FAMILY MEDICINE

## 2019-03-27 PROCEDURE — 99214 PR OFFICE/OUTPT VISIT, EST, LEVL IV, 30-39 MIN: ICD-10-PCS | Mod: S$GLB,,, | Performed by: FAMILY MEDICINE

## 2019-03-27 PROCEDURE — 99499 RISK ADDL DX/OHS AUDIT: ICD-10-PCS | Mod: S$GLB,,, | Performed by: FAMILY MEDICINE

## 2019-03-27 RX ORDER — TAMSULOSIN HYDROCHLORIDE 0.4 MG/1
0.8 CAPSULE ORAL NIGHTLY
Qty: 180 CAPSULE | Refills: 3 | Status: SHIPPED | OUTPATIENT
Start: 2019-03-27 | End: 2019-10-29 | Stop reason: SDUPTHER

## 2019-03-27 RX ORDER — OXYBUTYNIN CHLORIDE 5 MG/1
5 TABLET ORAL DAILY
Qty: 90 TABLET | Refills: 3 | Status: SHIPPED | OUTPATIENT
Start: 2019-03-27 | End: 2019-10-29 | Stop reason: SDUPTHER

## 2019-03-27 NOTE — PROGRESS NOTES
Subjective:      Patient ID: Claude L Taylor is a 89 y.o. male.    Chief Complaint: Follow-up (6 months ) and Annual Exam    Disclaimer:  This note is prepared using voice recognition software and as such is likely to have errors and has not been proof read. Please contact me for questions.     Pt is coming in today for followup an annual exam.   Brings in a list of symptoms.   Having back issues.  Has known issues with spinal stenosis.  Reports that lately he still has a lot of stiffness sometimes in his feet and toes.  Uses a role on pain killer for relief.  Often has cramps in his legs especially the ankles and in the evenings while sitting and resting.  He has had the 3 major back surgeries all provided short-term relief but no long-term.  His current back situation little or no problems while in better sitting but walking or standing present back pain at the waistline on both sides the spine number presses as time goes on causing additional stooping over.  It happens to affect the kidney area and sometimes just below the shoulder blades.  He gets more stooped over as he walks.  He is more straight 1st thing getting out the morning but it does not last very long.  He clyde over as the day progresses.  Walking then becomes a problem as he gets more stooped over and he gets short of breath with any exercise.  He has no problems when riding his battery powered scooter or working from the scooter.    He does report more urination problems it is not too bad at night but normally has to go once or twice during the night but he feels that he has a very weak flow rate.  Currently he is taking the Flomax at 0.4 mg at supper time.  He had been on the did her Ina before but he is subsequently stopped it.  When he was on the did her pain and before he was taking it in the a.m..    He reports he is very little strength lifting overhead his legs are also weak.  Getting up off the floor is very difficult.    Recently had  right knee swelling and went and saw orthopedist Dr. Salomon peterson on February 25th and got an injection in the medial aspect of the knee.  The swelling went down for about 2 days and then it with swelling free for 2 weeks but then it once again returned again on March 11th.  He has been using heating pads and resting for several days.  Only really has stiffness since then.    Reports he can't walk a straight line that his balance is poor.    Also reports that he has been seeing his cardiologist because he is most likely going to be needing a pacemaker change out soon.  He reports he has AFib and he has also had mitral valve repair.  He is are on chronic Coumadin Dr Medrano is cardiologist.     Can't walk a straight line. Balance is poor.   Plans on having a pacemaker change out soon. Going every month for a checkup.  Dr Medrano is the cardiologist.     Going to see dr morfin tomorrow for derm checkup tomorrow.     Dr Morfin- derm  Dr Barrios- card  Dr Gorman- GI  Dr Parisi- urology  Dr Castelan- NSG/back/[facility]  Dr Corbin- morphine/back injections.     Dr Le- ortho back  Dr Erwin Marsh- back interventionalist        Lab Results   Component Value Date    WBC 5.57 03/20/2019    HGB 13.2 (L) 03/20/2019    HCT 41.1 03/20/2019     03/20/2019    CHOL 156 03/20/2019    TRIG 59 03/20/2019    HDL 53 03/20/2019    ALT 11 03/20/2019    AST 22 03/20/2019     03/20/2019    K 4.7 03/20/2019     03/20/2019    CREATININE 0.8 03/20/2019    BUN 15 03/20/2019    CO2 24 03/20/2019    TSH 0.695 03/20/2019    PSA 2.3 06/15/2018    INR 1.0 12/14/2015       X-Ray Hips Bilateral 2 View Incl AP Pelvis  Narrative: EXAMINATION:  XR HIPS BILATERAL 2 VIEW INCL AP PELVIS    CLINICAL HISTORY:  Pain in right hip    TECHNIQUE:  AP view of the pelvis and frogleg lateral views of both hips were performed.    COMPARISON:  None.    FINDINGS:  The bony pelvis is intact. Both femoral heads are well seated within acetabula.  Mild degenerative  changes associated with either hip.  Surgical clips seen overlying the left groin.  A stimulator device is seen overlying the right gluteal region with a lead projecting over the right side of the abdomen.  The right hip is limited evaluation due to overlying is stimulator device.  No definite plain film evidence to suggest AVN of either hip.  Impression: As above    Electronically signed by: Stephon Dowell DO  Date:    10/12/2018  Time:    13:16        Review of Systems   Constitutional: Negative for activity change, appetite change, chills, fatigue and unexpected weight change.   HENT: Negative for congestion, ear pain, postnasal drip, sneezing, sore throat and trouble swallowing.    Eyes: Negative for pain and visual disturbance.   Respiratory: Positive for shortness of breath. Negative for cough.    Cardiovascular: Negative for chest pain and leg swelling.   Gastrointestinal: Negative for abdominal pain, constipation, diarrhea, nausea and vomiting.   Endocrine: Negative for cold intolerance and heat intolerance.   Genitourinary: Positive for frequency and urgency. Negative for difficulty urinating, dysuria and flank pain.   Musculoskeletal: Positive for arthralgias, back pain, gait problem, joint swelling and myalgias. Negative for neck pain.   Skin: Negative for color change and rash.   Neurological: Negative for dizziness, seizures and headaches.   Psychiatric/Behavioral: Positive for sleep disturbance. Negative for behavioral problems and dysphoric mood.     Objective:     Vitals:    03/27/19 1326   BP: 126/78   Pulse: 64   Temp: 97.2 °F (36.2 °C)   TempSrc: Tympanic   Weight: 79.9 kg (176 lb 2.4 oz)   Height: 6' (1.829 m)     Physical Exam   Constitutional: He appears well-developed and well-nourished.   HENT:   Head: Normocephalic and atraumatic.   Right Ear: Tympanic membrane normal.   Left Ear: Tympanic membrane normal.   Mouth/Throat: Oropharynx is clear and moist.   Eyes: Conjunctivae and EOM are  normal.   Neck: Normal range of motion. Neck supple.   Cardiovascular: Normal rate and regular rhythm.   Pulmonary/Chest: Effort normal and breath sounds normal.   Musculoskeletal:        Thoracic back: He exhibits decreased range of motion, tenderness, bony tenderness, pain and spasm.        Lumbar back: He exhibits decreased range of motion, tenderness, bony tenderness, pain and spasm.   Psychiatric: His speech is normal and behavior is normal. Judgment and thought content normal. His affect is blunt. Cognition and memory are normal.   Nursing note and vitals reviewed.    Assessment:     1. Routine general medical examination at a health care facility    2. Spinal stenosis of lumbar region at multiple levels    3. DDD (degenerative disc disease), lumbosacral    4. Atherosclerosis of aorta    5. Hx of CABG    6. Chronic midline back pain, unspecified back location    7. Chronic anticoagulation    8. Osteoarthritis, unspecified osteoarthritis type, unspecified site    9. Mitral valve replaced    10. OAB (overactive bladder)    11. Benign prostatic hyperplasia with weak urinary stream    12. SOB (shortness of breath) on exertion    13. Atrial fibrillation, chronic    14. Prostate cancer screening    15. B12 deficiency      Plan:   Claude was seen today for follow-up and annual exam.    Diagnoses and all orders for this visit:    Routine general medical examination at a health care facility - labs reviewed. Discussed Health Maintenance issues.       Spinal stenosis of lumbar region at multiple levels- stable at this time discussed current regimen at this time.  Discussed his current medications continue with gabapentin.  Continue with his current level of activity.  -     CBC auto differential; Future  -     Vitamin B12; Future  -     Comprehensive metabolic panel; Future    DDD (degenerative disc disease), lumbosacral    Atherosclerosis of aorta-followed by outside cardiologist declines statin    Hx of  CABG    Chronic midline back pain, unspecified back location    Chronic anticoagulation-followed by outside cardiologist on Coumadin    Osteoarthritis, unspecified osteoarthritis type, unspecified site    Mitral valve replaced on Coumadin    OAB (overactive bladder) add back ditropan low dose in the a.m.    Benign prostatic hyperplasia with weak urinary stream-increase Flomax to 0.8.    SOB (shortness of breath) on exertion-advised patient to follow back up with his cardiologist because with his shortness of breath with exertion it may be more related to his pacemaker or cardiac conditions due to the fact that he does have the valve replacement and AFib.  If his heart cannot keep up the demands when he is exerting himself it may be an indication that he may need sooner pacemaker replacement    Atrial fibrillation, chronic    Prostate cancer screening  -     PSA, Screening; Future    B12 deficiency continue with oral supplementation  -     CBC auto differential; Future  -     Vitamin B12; Future  -     Comprehensive metabolic panel; Future    Other orders  -     oxybutynin (DITROPAN) 5 MG Tab; Take 1 tablet (5 mg total) by mouth once daily. For overactive bladder  -     tamsulosin (FLOMAX) 0.4 mg Cap; Take 2 capsules (0.8 mg total) by mouth every evening. For prostate and urine flow  -     cyanocobalamin, vitamin B-12, 1,000 mcg/mL Drop; Place 5,000 mcg under the tongue once daily.            Follow up in about 6 months (around 9/27/2019).    There are no Patient Instructions on file for this visit.

## 2019-06-25 ENCOUNTER — PATIENT MESSAGE (OUTPATIENT)
Dept: INTERNAL MEDICINE | Facility: CLINIC | Age: 84
End: 2019-06-25

## 2019-06-26 ENCOUNTER — OFFICE VISIT (OUTPATIENT)
Dept: INTERNAL MEDICINE | Facility: CLINIC | Age: 84
End: 2019-06-26
Payer: MEDICARE

## 2019-06-26 VITALS
TEMPERATURE: 98 F | DIASTOLIC BLOOD PRESSURE: 66 MMHG | HEIGHT: 72 IN | BODY MASS INDEX: 24.28 KG/M2 | SYSTOLIC BLOOD PRESSURE: 120 MMHG | HEART RATE: 60 BPM | WEIGHT: 179.25 LBS

## 2019-06-26 DIAGNOSIS — H61.20 EXCESSIVE CERUMEN IN EAR CANAL, UNSPECIFIED LATERALITY: Primary | ICD-10-CM

## 2019-06-26 DIAGNOSIS — H91.90 HEARING LOSS, UNSPECIFIED HEARING LOSS TYPE, UNSPECIFIED LATERALITY: ICD-10-CM

## 2019-06-26 DIAGNOSIS — M79.89 LEG SWELLING: ICD-10-CM

## 2019-06-26 PROCEDURE — 99999 PR PBB SHADOW E&M-EST. PATIENT-LVL III: CPT | Mod: PBBFAC,,, | Performed by: FAMILY MEDICINE

## 2019-06-26 PROCEDURE — 99999 PR PBB SHADOW E&M-EST. PATIENT-LVL III: ICD-10-PCS | Mod: PBBFAC,,, | Performed by: FAMILY MEDICINE

## 2019-06-26 PROCEDURE — 99214 OFFICE O/P EST MOD 30 MIN: CPT | Mod: S$GLB,,, | Performed by: FAMILY MEDICINE

## 2019-06-26 PROCEDURE — 1101F PR PT FALLS ASSESS DOC 0-1 FALLS W/OUT INJ PAST YR: ICD-10-PCS | Mod: CPTII,S$GLB,, | Performed by: FAMILY MEDICINE

## 2019-06-26 PROCEDURE — 99214 PR OFFICE/OUTPT VISIT, EST, LEVL IV, 30-39 MIN: ICD-10-PCS | Mod: S$GLB,,, | Performed by: FAMILY MEDICINE

## 2019-06-26 PROCEDURE — 1101F PT FALLS ASSESS-DOCD LE1/YR: CPT | Mod: CPTII,S$GLB,, | Performed by: FAMILY MEDICINE

## 2019-06-26 RX ORDER — FUROSEMIDE 20 MG/1
20 TABLET ORAL
COMMUNITY
End: 2020-11-02

## 2019-06-26 NOTE — PROGRESS NOTES
Subjective:      Patient ID: Claude L Taylor is a 89 y.o. male.    Chief Complaint: Ear Fullness (hearing loss, )    Disclaimer:  This note is prepared using voice recognition software and as such is likely to have errors and has not been proof read. Please contact me for questions.     Patient is coming in today with his wife to discuss a few concerns.  Wears hearing aids.  Reports ear fullness. Right ear stopped up. Did ear wax removal last week. Not sure if it is cleaned out appropriated.  Wearing hearing aids.  Would be interested in doing additional testing.  Previously did his through [employer].  Has been using Debrox at home.  No fevers no chills no discharge other than the wax    Also reports that lately he has noted that his Ankles have been swelling. Formerly only the right extremity now yesterday took lasix and did got to bathroom a lot but now having more issues on the left ankle.  No injury.  Is imrpoving better though.  Flared up.  Doesn't think he ate any more salt than he usually does. Yesterday got away from the salt as much.  Does see outside cardiologist and recently seen.  Had a good checkup.    Still gets frustrated with this limited activity.  Reports that his physical body is weaker than what his mind in his abilities would be.          Lab Results   Component Value Date    WBC 5.57 03/20/2019    HGB 13.2 (L) 03/20/2019    HCT 41.1 03/20/2019     03/20/2019    CHOL 156 03/20/2019    TRIG 59 03/20/2019    HDL 53 03/20/2019    ALT 11 03/20/2019    AST 22 03/20/2019     03/20/2019    K 4.7 03/20/2019     03/20/2019    CREATININE 0.8 03/20/2019    BUN 15 03/20/2019    CO2 24 03/20/2019    TSH 0.695 03/20/2019    PSA 2.3 06/15/2018    INR 1.0 12/14/2015       X-Ray Hips Bilateral 2 View Incl AP Pelvis  Narrative: EXAMINATION:  XR HIPS BILATERAL 2 VIEW INCL AP PELVIS    CLINICAL HISTORY:  Pain in right hip    TECHNIQUE:  AP view of the pelvis and frogleg lateral views of both hips  were performed.    COMPARISON:  None.    FINDINGS:  The bony pelvis is intact. Both femoral heads are well seated within acetabula.  Mild degenerative changes associated with either hip.  Surgical clips seen overlying the left groin.  A stimulator device is seen overlying the right gluteal region with a lead projecting over the right side of the abdomen.  The right hip is limited evaluation due to overlying is stimulator device.  No definite plain film evidence to suggest AVN of either hip.  Impression: As above    Electronically signed by: Stephon Dowell DO  Date:    10/12/2018  Time:    13:16        Review of Systems   Constitutional: Positive for activity change and fatigue. Negative for appetite change and unexpected weight change.   HENT: Positive for hearing loss. Negative for congestion, ear discharge, ear pain, sinus pressure and sinus pain.    Respiratory: Negative for cough and shortness of breath.    Cardiovascular: Positive for leg swelling. Negative for chest pain and palpitations.   Musculoskeletal: Positive for arthralgias, back pain, gait problem and myalgias.   Psychiatric/Behavioral: Positive for dysphoric mood. The patient is not nervous/anxious.      Objective:     Vitals:    06/26/19 0846   BP: 120/66   Pulse: 60   Temp: 98 °F (36.7 °C)   Weight: 81.3 kg (179 lb 3.7 oz)   Height: 6' (1.829 m)     Physical Exam   Constitutional: He appears well-developed and well-nourished.   HENT:   Head: Normocephalic and atraumatic.   Right Ear: Tympanic membrane and external ear normal.   Left Ear: Tympanic membrane and external ear normal.   Mouth/Throat: Oropharynx is clear and moist.   Eyes: Conjunctivae and EOM are normal.   Neck: Normal range of motion. Neck supple.   Cardiovascular: Normal rate and regular rhythm.   Pulmonary/Chest: Effort normal and breath sounds normal.   Musculoskeletal:        Thoracic back: He exhibits decreased range of motion, tenderness, bony tenderness, pain and spasm.         Lumbar back: He exhibits decreased range of motion, tenderness, bony tenderness, pain and spasm.   Psychiatric: He has a normal mood and affect. His speech is normal and behavior is normal. Judgment and thought content normal. Cognition and memory are normal.   Nursing note and vitals reviewed.    Assessment:     1. Excessive cerumen in ear canal, unspecified laterality    2. Hearing loss, unspecified hearing loss type, unspecified laterality    3. Leg swelling      Plan:   Claude was seen today for ear fullness.    Diagnoses and all orders for this visit:    Excessive cerumen in ear canal, unspecified laterality-continue with deeper ox at home.  Can have removed the vacuum suction if necessary can refer to ENT if he desires.    Hearing loss, unspecified hearing loss type, unspecified laterality currently with hearing aids however some discomfort.  Can recommend audiology if he would like.  Willing to think about it at this time.  Discussed options with him.    Leg swelling-noted on Lasix recently saw Cardiology.  Suspect multifactorial including related to spinal stenosis as well.  Will continue monitor weight and blood pressure.            Follow up if symptoms worsen or fail to improve.    There are no Patient Instructions on file for this visit.

## 2019-07-03 ENCOUNTER — PATIENT MESSAGE (OUTPATIENT)
Dept: INTERNAL MEDICINE | Facility: CLINIC | Age: 84
End: 2019-07-03

## 2019-07-03 DIAGNOSIS — M54.5 LOW BACK PAIN, UNSPECIFIED BACK PAIN LATERALITY, UNSPECIFIED CHRONICITY, WITH SCIATICA PRESENCE UNSPECIFIED: Primary | ICD-10-CM

## 2019-07-03 NOTE — TELEPHONE ENCOUNTER
"Order placed per Dr. Romo.     "Can put in the referral for external PT for low back pain to [facility] and I will sign orders through [facility].   Mbl"  "

## 2019-08-19 ENCOUNTER — PATIENT MESSAGE (OUTPATIENT)
Dept: INTERNAL MEDICINE | Facility: CLINIC | Age: 84
End: 2019-08-19

## 2019-08-27 DIAGNOSIS — M48.061 SPINAL STENOSIS OF LUMBAR REGION AT MULTIPLE LEVELS: ICD-10-CM

## 2019-08-27 RX ORDER — GABAPENTIN 100 MG/1
200 CAPSULE ORAL 3 TIMES DAILY
Qty: 540 CAPSULE | Refills: 3 | Status: SHIPPED | OUTPATIENT
Start: 2019-08-27 | End: 2020-09-05 | Stop reason: SDUPTHER

## 2019-09-20 ENCOUNTER — LAB VISIT (OUTPATIENT)
Dept: LAB | Facility: HOSPITAL | Age: 84
End: 2019-09-20
Attending: FAMILY MEDICINE
Payer: MEDICARE

## 2019-09-20 DIAGNOSIS — M48.061 SPINAL STENOSIS OF LUMBAR REGION AT MULTIPLE LEVELS: ICD-10-CM

## 2019-09-20 DIAGNOSIS — Z12.5 PROSTATE CANCER SCREENING: ICD-10-CM

## 2019-09-20 DIAGNOSIS — E53.8 B12 DEFICIENCY: ICD-10-CM

## 2019-09-20 LAB
ALBUMIN SERPL BCP-MCNC: 3.8 G/DL (ref 3.5–5.2)
ALP SERPL-CCNC: 74 U/L (ref 55–135)
ALT SERPL W/O P-5'-P-CCNC: 10 U/L (ref 10–44)
ANION GAP SERPL CALC-SCNC: 8 MMOL/L (ref 8–16)
AST SERPL-CCNC: 20 U/L (ref 10–40)
BASOPHILS # BLD AUTO: 0.07 K/UL (ref 0–0.2)
BASOPHILS NFR BLD: 1.2 % (ref 0–1.9)
BILIRUB SERPL-MCNC: 1.6 MG/DL (ref 0.1–1)
BUN SERPL-MCNC: 14 MG/DL (ref 10–30)
CALCIUM SERPL-MCNC: 9 MG/DL (ref 8.7–10.5)
CHLORIDE SERPL-SCNC: 107 MMOL/L (ref 95–110)
CO2 SERPL-SCNC: 24 MMOL/L (ref 23–29)
COMPLEXED PSA SERPL-MCNC: 2.3 NG/ML (ref 0–4)
CREAT SERPL-MCNC: 1 MG/DL (ref 0.5–1.4)
DIFFERENTIAL METHOD: ABNORMAL
EOSINOPHIL # BLD AUTO: 0.3 K/UL (ref 0–0.5)
EOSINOPHIL NFR BLD: 4.9 % (ref 0–8)
ERYTHROCYTE [DISTWIDTH] IN BLOOD BY AUTOMATED COUNT: 14.6 % (ref 11.5–14.5)
EST. GFR  (AFRICAN AMERICAN): >60 ML/MIN/1.73 M^2
EST. GFR  (NON AFRICAN AMERICAN): >60 ML/MIN/1.73 M^2
GLUCOSE SERPL-MCNC: 83 MG/DL (ref 70–110)
HCT VFR BLD AUTO: 40.9 % (ref 40–54)
HGB BLD-MCNC: 12.4 G/DL (ref 14–18)
IMM GRANULOCYTES # BLD AUTO: 0.01 K/UL (ref 0–0.04)
IMM GRANULOCYTES NFR BLD AUTO: 0.2 % (ref 0–0.5)
LYMPHOCYTES # BLD AUTO: 1.5 K/UL (ref 1–4.8)
LYMPHOCYTES NFR BLD: 25.5 % (ref 18–48)
MCH RBC QN AUTO: 30.2 PG (ref 27–31)
MCHC RBC AUTO-ENTMCNC: 30.3 G/DL (ref 32–36)
MCV RBC AUTO: 100 FL (ref 82–98)
MONOCYTES # BLD AUTO: 0.7 K/UL (ref 0.3–1)
MONOCYTES NFR BLD: 11.3 % (ref 4–15)
NEUTROPHILS # BLD AUTO: 3.4 K/UL (ref 1.8–7.7)
NEUTROPHILS NFR BLD: 56.9 % (ref 38–73)
NRBC BLD-RTO: 0 /100 WBC
PLATELET # BLD AUTO: 175 K/UL (ref 150–350)
PMV BLD AUTO: 11.4 FL (ref 9.2–12.9)
POTASSIUM SERPL-SCNC: 4.7 MMOL/L (ref 3.5–5.1)
PROT SERPL-MCNC: 6.8 G/DL (ref 6–8.4)
RBC # BLD AUTO: 4.11 M/UL (ref 4.6–6.2)
SODIUM SERPL-SCNC: 139 MMOL/L (ref 136–145)
VIT B12 SERPL-MCNC: 726 PG/ML (ref 210–950)
WBC # BLD AUTO: 5.95 K/UL (ref 3.9–12.7)

## 2019-09-20 PROCEDURE — 80053 COMPREHEN METABOLIC PANEL: CPT

## 2019-09-20 PROCEDURE — 82607 VITAMIN B-12: CPT

## 2019-09-20 PROCEDURE — 85025 COMPLETE CBC W/AUTO DIFF WBC: CPT

## 2019-09-20 PROCEDURE — 84153 ASSAY OF PSA TOTAL: CPT

## 2019-09-20 PROCEDURE — 36415 COLL VENOUS BLD VENIPUNCTURE: CPT | Mod: PO

## 2019-09-25 ENCOUNTER — OFFICE VISIT (OUTPATIENT)
Dept: INTERNAL MEDICINE | Facility: CLINIC | Age: 84
End: 2019-09-25
Payer: MEDICARE

## 2019-09-25 VITALS
WEIGHT: 179.44 LBS | SYSTOLIC BLOOD PRESSURE: 128 MMHG | HEIGHT: 72 IN | DIASTOLIC BLOOD PRESSURE: 58 MMHG | TEMPERATURE: 98 F | BODY MASS INDEX: 24.3 KG/M2

## 2019-09-25 DIAGNOSIS — G89.29 CHRONIC PAIN OF BOTH KNEES: ICD-10-CM

## 2019-09-25 DIAGNOSIS — R39.12 BENIGN PROSTATIC HYPERPLASIA WITH WEAK URINARY STREAM: ICD-10-CM

## 2019-09-25 DIAGNOSIS — I70.0 ATHEROSCLEROSIS OF AORTA: ICD-10-CM

## 2019-09-25 DIAGNOSIS — Z79.01 CHRONIC ANTICOAGULATION: ICD-10-CM

## 2019-09-25 DIAGNOSIS — I48.20 ATRIAL FIBRILLATION, CHRONIC: ICD-10-CM

## 2019-09-25 DIAGNOSIS — M51.37 DDD (DEGENERATIVE DISC DISEASE), LUMBOSACRAL: ICD-10-CM

## 2019-09-25 DIAGNOSIS — M46.1 SACROILIITIS: ICD-10-CM

## 2019-09-25 DIAGNOSIS — N40.1 BENIGN PROSTATIC HYPERPLASIA WITH WEAK URINARY STREAM: ICD-10-CM

## 2019-09-25 DIAGNOSIS — M25.561 CHRONIC PAIN OF BOTH KNEES: ICD-10-CM

## 2019-09-25 DIAGNOSIS — M48.061 SPINAL STENOSIS OF LUMBAR REGION AT MULTIPLE LEVELS: ICD-10-CM

## 2019-09-25 DIAGNOSIS — E53.8 B12 DEFICIENCY: Primary | ICD-10-CM

## 2019-09-25 DIAGNOSIS — M25.562 CHRONIC PAIN OF BOTH KNEES: ICD-10-CM

## 2019-09-25 PROCEDURE — 1101F PT FALLS ASSESS-DOCD LE1/YR: CPT | Mod: CPTII,S$GLB,, | Performed by: FAMILY MEDICINE

## 2019-09-25 PROCEDURE — 99499 UNLISTED E&M SERVICE: CPT | Mod: S$GLB,,, | Performed by: FAMILY MEDICINE

## 2019-09-25 PROCEDURE — 99999 PR PBB SHADOW E&M-EST. PATIENT-LVL III: ICD-10-PCS | Mod: PBBFAC,,, | Performed by: FAMILY MEDICINE

## 2019-09-25 PROCEDURE — 90662 FLU VACCINE - HIGH DOSE (65+) PRESERVATIVE FREE IM: ICD-10-PCS | Mod: S$GLB,,, | Performed by: FAMILY MEDICINE

## 2019-09-25 PROCEDURE — G0008 FLU VACCINE - HIGH DOSE (65+) PRESERVATIVE FREE IM: ICD-10-PCS | Mod: S$GLB,,, | Performed by: FAMILY MEDICINE

## 2019-09-25 PROCEDURE — 1101F PR PT FALLS ASSESS DOC 0-1 FALLS W/OUT INJ PAST YR: ICD-10-PCS | Mod: CPTII,S$GLB,, | Performed by: FAMILY MEDICINE

## 2019-09-25 PROCEDURE — G0008 ADMIN INFLUENZA VIRUS VAC: HCPCS | Mod: S$GLB,,, | Performed by: FAMILY MEDICINE

## 2019-09-25 PROCEDURE — 99214 OFFICE O/P EST MOD 30 MIN: CPT | Mod: 25,S$GLB,, | Performed by: FAMILY MEDICINE

## 2019-09-25 PROCEDURE — 99499 RISK ADDL DX/OHS AUDIT: ICD-10-PCS | Mod: S$GLB,,, | Performed by: FAMILY MEDICINE

## 2019-09-25 PROCEDURE — 90662 IIV NO PRSV INCREASED AG IM: CPT | Mod: S$GLB,,, | Performed by: FAMILY MEDICINE

## 2019-09-25 PROCEDURE — 99999 PR PBB SHADOW E&M-EST. PATIENT-LVL III: CPT | Mod: PBBFAC,,, | Performed by: FAMILY MEDICINE

## 2019-09-25 PROCEDURE — 99214 PR OFFICE/OUTPT VISIT, EST, LEVL IV, 30-39 MIN: ICD-10-PCS | Mod: 25,S$GLB,, | Performed by: FAMILY MEDICINE

## 2019-09-25 NOTE — PROGRESS NOTES
Subjective:      Patient ID: Claude L Taylor is a 89 y.o. male.    Chief Complaint: Follow-up (6 months )    Disclaimer:  This note is prepared using voice recognition software and as such is likely to have errors and has not been proof read. Please contact me for questions.     Patient is coming in today with his wife to discuss a few concerns.   Replaced pacemaker. Saw Dr Carmona, this one with 11 yo battery life. diong well. No change with energy.  bp looks good. Ankles and feet haven't been swelling since the change in the pacemaker.     Flu shot today.      Does report occasionally with bilateral knee pain especially the end of the day.  Usually take a Tylenol which seems to help.  Does have known issues with chronic lumbar spinal stenosis arthritis and multiple fusions of the lumbar spine.  Actually is with his standing propelled more forward.  Uses a cane in his right hand which puts more pressure on his right knee.  Feels that he would benefit from doing possibly some physical therapy.  Is on gabapentin currently at 200 mg t.i.d..  Finds is helping.    PSA levels are stable.    Is doing better with the B12 liquid supplementation.  Levels are up to the 700s now.    Reports that no further ankle swelling.  Still gets frustrated with limited activity but his cognition is very good.          Lab Results   Component Value Date    WBC 5.95 09/20/2019    HGB 12.4 (L) 09/20/2019    HCT 40.9 09/20/2019     09/20/2019    CHOL 156 03/20/2019    TRIG 59 03/20/2019    HDL 53 03/20/2019    ALT 10 09/20/2019    AST 20 09/20/2019     09/20/2019    K 4.7 09/20/2019     09/20/2019    CREATININE 1.0 09/20/2019    BUN 14 09/20/2019    CO2 24 09/20/2019    TSH 0.695 03/20/2019    PSA 2.3 09/20/2019    INR 1.0 12/14/2015       X-Ray Hips Bilateral 2 View Incl AP Pelvis  Narrative: EXAMINATION:  XR HIPS BILATERAL 2 VIEW INCL AP PELVIS    CLINICAL HISTORY:  Pain in right hip    TECHNIQUE:  AP view of the pelvis  and frogleg lateral views of both hips were performed.    COMPARISON:  None.    FINDINGS:  The bony pelvis is intact. Both femoral heads are well seated within acetabula.  Mild degenerative changes associated with either hip.  Surgical clips seen overlying the left groin.  A stimulator device is seen overlying the right gluteal region with a lead projecting over the right side of the abdomen.  The right hip is limited evaluation due to overlying is stimulator device.  No definite plain film evidence to suggest AVN of either hip.  Impression: As above    Electronically signed by: Stephon Dowell DO  Date:    10/12/2018  Time:    13:16        Review of Systems   Constitutional: Negative for activity change, appetite change, chills and fatigue.   HENT: Negative for sore throat and trouble swallowing.    Respiratory: Negative for cough and shortness of breath.    Cardiovascular: Negative for chest pain and leg swelling.   Gastrointestinal: Negative for abdominal pain, constipation, diarrhea and nausea.   Genitourinary: Negative for difficulty urinating, dysuria and flank pain.   Musculoskeletal: Positive for arthralgias, back pain, gait problem, myalgias, neck pain and neck stiffness. Negative for joint swelling.   Neurological: Positive for numbness. Negative for dizziness, weakness and headaches.   Psychiatric/Behavioral: Positive for sleep disturbance. Negative for decreased concentration and dysphoric mood. The patient is not nervous/anxious.      Objective:     Vitals:    09/25/19 1233   BP: (!) 128/58   Temp: 98.4 °F (36.9 °C)   TempSrc: Oral   Weight: 81.4 kg (179 lb 7.3 oz)   Height: 6' (1.829 m)     Physical Exam   Constitutional: He appears well-developed and well-nourished.   HENT:   Head: Normocephalic and atraumatic.   Right Ear: Tympanic membrane normal.   Left Ear: Tympanic membrane normal.   Mouth/Throat: Oropharynx is clear and moist.   Eyes: Conjunctivae and EOM are normal.   Neck: Normal range of  motion. Neck supple.   Cardiovascular: Normal rate and regular rhythm.   Pulmonary/Chest: Effort normal and breath sounds normal.   Musculoskeletal:        Right knee: He exhibits decreased range of motion and deformity. No tenderness found. No medial joint line and no lateral joint line tenderness noted.        Left knee: He exhibits decreased range of motion and deformity. No tenderness found. No medial joint line and no lateral joint line tenderness noted.        Lumbar back: He exhibits decreased range of motion, tenderness, bony tenderness and pain. He exhibits no spasm.   Psychiatric: He has a normal mood and affect. His speech is normal and behavior is normal.   Nursing note and vitals reviewed.    Assessment:     1. B12 deficiency    2. Chronic pain of both knees    3. Spinal stenosis of lumbar region at multiple levels    4. DDD (degenerative disc disease), lumbosacral    5. Atrial fibrillation, chronic    6. Benign prostatic hyperplasia with weak urinary stream    7. Atherosclerosis of aorta    8. Chronic anticoagulation    9. Sacroiliitis      Plan:   Claude was seen today for follow-up.    Diagnoses and all orders for this visit:    B12 deficiency-improving  -     Comprehensive metabolic panel; Future  -     Lipid panel; Future  -     CBC auto differential; Future  -     TSH; Future  -     Vitamin B12; Future    Chronic pain of both knees-request to do physical therapy will submit.  Also okay to use Tylenol  -     Comprehensive metabolic panel; Future  -     Lipid panel; Future  -     CBC auto differential; Future  -     TSH; Future  -     Vitamin B12; Future  -     Ambulatory Referral to Physical/Occupational Therapy    Spinal stenosis of lumbar region at multiple levels-continue with gabapentin 200 mg t.i.d. encourage physical therapy okay to use Tylenol.  Declines further interventions  -     Comprehensive metabolic panel; Future  -     Lipid panel; Future  -     CBC auto differential; Future  -      TSH; Future  -     Vitamin B12; Future  -     Ambulatory Referral to Physical/Occupational Therapy    DDD (degenerative disc disease), lumbosacral -continue with gabapentin 200 mg t.i.d. encourage physical therapy okay to use Tylenol.  Declines further interventions-reviewed extensively with x-rays and imaging studies face-to-face with the patient  -     Comprehensive metabolic panel; Future  -     Lipid panel; Future  -     CBC auto differential; Future  -     TSH; Future  -     Vitamin B12; Future  -     Ambulatory Referral to Physical/Occupational Therapy    Atrial fibrillation, chronic status post with a pacemaker now with 12 yr battery followed by Dr Carmona.   -     Comprehensive metabolic panel; Future  -     Lipid panel; Future  -     CBC auto differential; Future  -     TSH; Future  -     Vitamin B12; Future    Benign prostatic hyperplasia with weak urinary stream - stable, Continue with current medications and interventions. Labs reviewed.     -     Comprehensive metabolic panel; Future  -     Lipid panel; Future  -     CBC auto differential; Future  -     TSH; Future  -     Vitamin B12; Future    Atherosclerosis of aorta - stable, Continue with current medications and interventions. Labs reviewed.     -     Comprehensive metabolic panel; Future  -     Lipid panel; Future  -     CBC auto differential; Future  -     TSH; Future  -     Vitamin B12; Future    Chronic anticoagulation- followed by dr carmona. On coumadin.   -     Comprehensive metabolic panel; Future  -     Lipid panel; Future  -     CBC auto differential; Future  -     TSH; Future  -     Vitamin B12; Future    Sacroiliitis- refer to PT>     Other orders  -     Influenza - High Dose (65+) (PF) (IM)            Follow up in about 6 months (around 3/25/2020) for chronic issues Dr Romo.    There are no Patient Instructions on file for this visit.

## 2019-09-27 ENCOUNTER — OFFICE VISIT (OUTPATIENT)
Dept: OPHTHALMOLOGY | Facility: CLINIC | Age: 84
End: 2019-09-27
Payer: MEDICARE

## 2019-09-27 DIAGNOSIS — H52.7 REFRACTION DISORDER: ICD-10-CM

## 2019-09-27 DIAGNOSIS — Z96.1 PSEUDOPHAKIA OF BOTH EYES: Primary | ICD-10-CM

## 2019-09-27 DIAGNOSIS — H04.123 DRY EYE SYNDROME, BILATERAL: ICD-10-CM

## 2019-09-27 DIAGNOSIS — D22.9 NEVUS: ICD-10-CM

## 2019-09-27 PROCEDURE — 92015 PR REFRACTION: ICD-10-PCS | Mod: S$GLB,,, | Performed by: OPHTHALMOLOGY

## 2019-09-27 PROCEDURE — 92015 DETERMINE REFRACTIVE STATE: CPT | Mod: S$GLB,,, | Performed by: OPHTHALMOLOGY

## 2019-09-27 PROCEDURE — 92250 FUNDUS PHOTOGRAPHY W/I&R: CPT | Mod: S$GLB,,, | Performed by: OPHTHALMOLOGY

## 2019-09-27 PROCEDURE — 92004 COMPRE OPH EXAM NEW PT 1/>: CPT | Mod: S$GLB,,, | Performed by: OPHTHALMOLOGY

## 2019-09-27 PROCEDURE — 92250 COLOR FUNDUS PHOTOGRAPHY - OU - BOTH EYES: ICD-10-PCS | Mod: S$GLB,,, | Performed by: OPHTHALMOLOGY

## 2019-09-27 PROCEDURE — 99999 PR PBB SHADOW E&M-EST. PATIENT-LVL II: ICD-10-PCS | Mod: PBBFAC,,, | Performed by: OPHTHALMOLOGY

## 2019-09-27 PROCEDURE — 92004 PR EYE EXAM, NEW PATIENT,COMPREHESV: ICD-10-PCS | Mod: S$GLB,,, | Performed by: OPHTHALMOLOGY

## 2019-09-27 PROCEDURE — 99999 PR PBB SHADOW E&M-EST. PATIENT-LVL II: CPT | Mod: PBBFAC,,, | Performed by: OPHTHALMOLOGY

## 2019-09-27 NOTE — PROGRESS NOTES
HPI     OD VA blurred worse for near  OD tearing with redness  No pain    [facility]GM patient 1999    PCIOL OD 6-1998 (Dr. Green)  PCIOL OS 2-1999    OD: Restasis PRN    Last edited by Azra Peters on 9/27/2019  9:38 AM. (History)            Assessment /Plan     For exam results, see Encounter Report.      ICD-10-CM ICD-9-CM    1. Pseudophakia of both eyes Z96.1 V43.1 Stable. Open capsule OS    2. Dry eye syndrome, bilateral H04.123 375.15 Begin systane 3-4 times daily, gel nightly    3. Nevus D22.9 216.9 peripapillary nevus adjacent to disc OS. Baseline SDP taken today    4. 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

## 2019-10-29 RX ORDER — OXYBUTYNIN CHLORIDE 5 MG/1
5 TABLET ORAL DAILY
Qty: 90 TABLET | Refills: 3 | Status: SHIPPED | OUTPATIENT
Start: 2019-10-29 | End: 2020-12-05 | Stop reason: SDUPTHER

## 2019-10-29 RX ORDER — TAMSULOSIN HYDROCHLORIDE 0.4 MG/1
0.8 CAPSULE ORAL NIGHTLY
Qty: 180 CAPSULE | Refills: 3 | Status: SHIPPED | OUTPATIENT
Start: 2019-10-29 | End: 2020-12-05 | Stop reason: SDUPTHER

## 2020-03-05 ENCOUNTER — LAB VISIT (OUTPATIENT)
Dept: LAB | Facility: HOSPITAL | Age: 85
End: 2020-03-05
Attending: FAMILY MEDICINE
Payer: MEDICARE

## 2020-03-05 DIAGNOSIS — M25.562 CHRONIC PAIN OF BOTH KNEES: ICD-10-CM

## 2020-03-05 DIAGNOSIS — M25.561 CHRONIC PAIN OF BOTH KNEES: ICD-10-CM

## 2020-03-05 DIAGNOSIS — E53.8 B12 DEFICIENCY: ICD-10-CM

## 2020-03-05 DIAGNOSIS — M51.37 DDD (DEGENERATIVE DISC DISEASE), LUMBOSACRAL: ICD-10-CM

## 2020-03-05 DIAGNOSIS — M48.061 SPINAL STENOSIS OF LUMBAR REGION AT MULTIPLE LEVELS: ICD-10-CM

## 2020-03-05 DIAGNOSIS — R39.12 BENIGN PROSTATIC HYPERPLASIA WITH WEAK URINARY STREAM: ICD-10-CM

## 2020-03-05 DIAGNOSIS — Z79.01 CHRONIC ANTICOAGULATION: ICD-10-CM

## 2020-03-05 DIAGNOSIS — N40.1 BENIGN PROSTATIC HYPERPLASIA WITH WEAK URINARY STREAM: ICD-10-CM

## 2020-03-05 DIAGNOSIS — I70.0 ATHEROSCLEROSIS OF AORTA: ICD-10-CM

## 2020-03-05 DIAGNOSIS — I48.20 ATRIAL FIBRILLATION, CHRONIC: ICD-10-CM

## 2020-03-05 DIAGNOSIS — G89.29 CHRONIC PAIN OF BOTH KNEES: ICD-10-CM

## 2020-03-05 LAB
ALBUMIN SERPL BCP-MCNC: 3.4 G/DL (ref 3.5–5.2)
ALP SERPL-CCNC: 73 U/L (ref 55–135)
ALT SERPL W/O P-5'-P-CCNC: 12 U/L (ref 10–44)
ANION GAP SERPL CALC-SCNC: 6 MMOL/L (ref 8–16)
AST SERPL-CCNC: 20 U/L (ref 10–40)
BASOPHILS # BLD AUTO: 0.08 K/UL (ref 0–0.2)
BASOPHILS NFR BLD: 1.5 % (ref 0–1.9)
BILIRUB SERPL-MCNC: 1 MG/DL (ref 0.1–1)
BUN SERPL-MCNC: 13 MG/DL (ref 10–30)
CALCIUM SERPL-MCNC: 8.7 MG/DL (ref 8.7–10.5)
CHLORIDE SERPL-SCNC: 109 MMOL/L (ref 95–110)
CHOLEST SERPL-MCNC: 148 MG/DL (ref 120–199)
CHOLEST/HDLC SERPL: 2.7 {RATIO} (ref 2–5)
CO2 SERPL-SCNC: 27 MMOL/L (ref 23–29)
CREAT SERPL-MCNC: 0.9 MG/DL (ref 0.5–1.4)
DIFFERENTIAL METHOD: ABNORMAL
EOSINOPHIL # BLD AUTO: 0.5 K/UL (ref 0–0.5)
EOSINOPHIL NFR BLD: 9 % (ref 0–8)
ERYTHROCYTE [DISTWIDTH] IN BLOOD BY AUTOMATED COUNT: 14.9 % (ref 11.5–14.5)
EST. GFR  (AFRICAN AMERICAN): >60 ML/MIN/1.73 M^2
EST. GFR  (NON AFRICAN AMERICAN): >60 ML/MIN/1.73 M^2
GLUCOSE SERPL-MCNC: 90 MG/DL (ref 70–110)
HCT VFR BLD AUTO: 39.9 % (ref 40–54)
HDLC SERPL-MCNC: 54 MG/DL (ref 40–75)
HDLC SERPL: 36.5 % (ref 20–50)
HGB BLD-MCNC: 12.3 G/DL (ref 14–18)
IMM GRANULOCYTES # BLD AUTO: 0.01 K/UL (ref 0–0.04)
IMM GRANULOCYTES NFR BLD AUTO: 0.2 % (ref 0–0.5)
LDLC SERPL CALC-MCNC: 82 MG/DL (ref 63–159)
LYMPHOCYTES # BLD AUTO: 1.4 K/UL (ref 1–4.8)
LYMPHOCYTES NFR BLD: 26.3 % (ref 18–48)
MCH RBC QN AUTO: 30.7 PG (ref 27–31)
MCHC RBC AUTO-ENTMCNC: 30.8 G/DL (ref 32–36)
MCV RBC AUTO: 100 FL (ref 82–98)
MONOCYTES # BLD AUTO: 0.7 K/UL (ref 0.3–1)
MONOCYTES NFR BLD: 12.2 % (ref 4–15)
NEUTROPHILS # BLD AUTO: 2.8 K/UL (ref 1.8–7.7)
NEUTROPHILS NFR BLD: 50.8 % (ref 38–73)
NONHDLC SERPL-MCNC: 94 MG/DL
NRBC BLD-RTO: 0 /100 WBC
PLATELET # BLD AUTO: 143 K/UL (ref 150–350)
PMV BLD AUTO: 11.6 FL (ref 9.2–12.9)
POTASSIUM SERPL-SCNC: 4.7 MMOL/L (ref 3.5–5.1)
PROT SERPL-MCNC: 6.3 G/DL (ref 6–8.4)
RBC # BLD AUTO: 4.01 M/UL (ref 4.6–6.2)
SODIUM SERPL-SCNC: 142 MMOL/L (ref 136–145)
TRIGL SERPL-MCNC: 60 MG/DL (ref 30–150)
TSH SERPL DL<=0.005 MIU/L-ACNC: 0.95 UIU/ML (ref 0.4–4)
WBC # BLD AUTO: 5.47 K/UL (ref 3.9–12.7)

## 2020-03-05 PROCEDURE — 36415 COLL VENOUS BLD VENIPUNCTURE: CPT | Mod: PO

## 2020-03-05 PROCEDURE — 84443 ASSAY THYROID STIM HORMONE: CPT

## 2020-03-05 PROCEDURE — 80053 COMPREHEN METABOLIC PANEL: CPT

## 2020-03-05 PROCEDURE — 85025 COMPLETE CBC W/AUTO DIFF WBC: CPT

## 2020-03-05 PROCEDURE — 82607 VITAMIN B-12: CPT

## 2020-03-05 PROCEDURE — 80061 LIPID PANEL: CPT

## 2020-03-06 LAB — VIT B12 SERPL-MCNC: 506 PG/ML (ref 210–950)

## 2020-03-12 ENCOUNTER — OFFICE VISIT (OUTPATIENT)
Dept: INTERNAL MEDICINE | Facility: CLINIC | Age: 85
End: 2020-03-12
Payer: MEDICARE

## 2020-03-12 VITALS
HEIGHT: 72 IN | DIASTOLIC BLOOD PRESSURE: 58 MMHG | TEMPERATURE: 98 F | HEART RATE: 62 BPM | WEIGHT: 182.13 LBS | SYSTOLIC BLOOD PRESSURE: 120 MMHG | BODY MASS INDEX: 24.67 KG/M2

## 2020-03-12 DIAGNOSIS — G89.29 CHRONIC MIDLINE BACK PAIN, UNSPECIFIED BACK LOCATION: ICD-10-CM

## 2020-03-12 DIAGNOSIS — M46.1 SACROILIITIS: ICD-10-CM

## 2020-03-12 DIAGNOSIS — M51.37 DDD (DEGENERATIVE DISC DISEASE), LUMBOSACRAL: ICD-10-CM

## 2020-03-12 DIAGNOSIS — E53.8 B12 DEFICIENCY: ICD-10-CM

## 2020-03-12 DIAGNOSIS — Z79.01 CHRONIC ANTICOAGULATION: ICD-10-CM

## 2020-03-12 DIAGNOSIS — G89.29 CHRONIC PAIN OF BOTH KNEES: ICD-10-CM

## 2020-03-12 DIAGNOSIS — M25.561 CHRONIC PAIN OF BOTH KNEES: ICD-10-CM

## 2020-03-12 DIAGNOSIS — I48.20 ATRIAL FIBRILLATION, CHRONIC: ICD-10-CM

## 2020-03-12 DIAGNOSIS — M25.562 CHRONIC PAIN OF BOTH KNEES: ICD-10-CM

## 2020-03-12 DIAGNOSIS — J45.909 ASTHMA IN ADULT WITHOUT COMPLICATION, UNSPECIFIED ASTHMA SEVERITY, UNSPECIFIED WHETHER PERSISTENT: ICD-10-CM

## 2020-03-12 DIAGNOSIS — I25.119 CORONARY ARTERY DISEASE INVOLVING NATIVE CORONARY ARTERY OF NATIVE HEART WITH ANGINA PECTORIS: ICD-10-CM

## 2020-03-12 DIAGNOSIS — M19.90 OSTEOARTHRITIS, UNSPECIFIED OSTEOARTHRITIS TYPE, UNSPECIFIED SITE: ICD-10-CM

## 2020-03-12 DIAGNOSIS — Z00.00 ROUTINE GENERAL MEDICAL EXAMINATION AT A HEALTH CARE FACILITY: Primary | ICD-10-CM

## 2020-03-12 DIAGNOSIS — I70.0 ATHEROSCLEROSIS OF AORTA: ICD-10-CM

## 2020-03-12 DIAGNOSIS — I25.10 CORONARY ARTERY DISEASE INVOLVING NATIVE CORONARY ARTERY WITHOUT ANGINA PECTORIS, UNSPECIFIED WHETHER NATIVE OR TRANSPLANTED HEART: ICD-10-CM

## 2020-03-12 DIAGNOSIS — M62.81 PROXIMAL MUSCLE WEAKNESS: ICD-10-CM

## 2020-03-12 DIAGNOSIS — M54.9 CHRONIC MIDLINE BACK PAIN, UNSPECIFIED BACK LOCATION: ICD-10-CM

## 2020-03-12 PROCEDURE — 99999 PR PBB SHADOW E&M-EST. PATIENT-LVL III: ICD-10-PCS | Mod: PBBFAC,,, | Performed by: FAMILY MEDICINE

## 2020-03-12 PROCEDURE — 99213 OFFICE O/P EST LOW 20 MIN: CPT | Mod: S$GLB,,, | Performed by: FAMILY MEDICINE

## 2020-03-12 PROCEDURE — 99999 PR PBB SHADOW E&M-EST. PATIENT-LVL III: CPT | Mod: PBBFAC,,, | Performed by: FAMILY MEDICINE

## 2020-03-12 PROCEDURE — 99499 RISK ADDL DX/OHS AUDIT: ICD-10-PCS | Mod: S$GLB,,, | Performed by: FAMILY MEDICINE

## 2020-03-12 PROCEDURE — 99213 PR OFFICE/OUTPT VISIT, EST, LEVL III, 20-29 MIN: ICD-10-PCS | Mod: S$GLB,,, | Performed by: FAMILY MEDICINE

## 2020-03-12 PROCEDURE — 99499 UNLISTED E&M SERVICE: CPT | Mod: S$GLB,,, | Performed by: FAMILY MEDICINE

## 2020-03-12 NOTE — PROGRESS NOTES
Subjective:      Patient ID: Claude L Taylor is a 89 y.o. male.    Chief Complaint: Annual Exam    Disclaimer:  This note is prepared using voice recognition software and as such is likely to have errors and has not been proof read. Please contact me for questions.     Patient is coming in today with  alist of currently concerns and updated medical history.   Mostly with arthritis complaints with knees, toes, hands, and legs.   Is doing liquid b12. Is benefiting him. Anemia is same. No worse.   Some weakness still with proximal muscles. Very active for his age and conditions.   On gabapentin. benefitting him.   With arthritis issues in both knees. Left knee pops. Right knee has had steroid shot in past but didn't last more than a few weeks.   Seeing dr carroll for recurrent itching and rashes tried antihistamine as well as several steroid creams. Gets itching after wearing belt with pants for more than 3 days.    Replaced pacemaker. Saw Dr Carmona, this one with 13 yo battery life. diong well. No change with energy.  bp looks good. Ankles and feet haven't been swelling since the change in the pacemaker.     Does report occasionally with bilateral knee pain especially the end of the day.  Usually take a Tylenol which seems to help.  Does have known issues with chronic lumbar spinal stenosis arthritis and multiple fusions of the lumbar spine.  Actually is with his standing propelled more forward.    Is on gabapentin currently at 200 mg t.i.d..  Finds is helping.    PSA levels are stable.    Is doing better with the B12 liquid supplementation.  Levels are up to the 700s now.              Lab Results   Component Value Date    WBC 5.47 03/05/2020    HGB 12.3 (L) 03/05/2020    HCT 39.9 (L) 03/05/2020     (L) 03/05/2020    CHOL 148 03/05/2020    TRIG 60 03/05/2020    HDL 54 03/05/2020    ALT 12 03/05/2020    AST 20 03/05/2020     03/05/2020    K 4.7 03/05/2020     03/05/2020    CREATININE 0.9 03/05/2020     BUN 13 03/05/2020    CO2 27 03/05/2020    TSH 0.948 03/05/2020    PSA 2.3 09/20/2019    INR 1.0 12/14/2015       Color Fundus Photography - OU - Both Eyes  Baseline SDP for nevus OS         Review of Systems   Constitutional: Negative for activity change, appetite change, chills, fatigue and unexpected weight change.   HENT: Negative for congestion, sore throat and trouble swallowing.    Respiratory: Negative for cough and shortness of breath.    Cardiovascular: Negative for chest pain and leg swelling.   Gastrointestinal: Negative for abdominal distention, abdominal pain, constipation, diarrhea and nausea.   Genitourinary: Negative for difficulty urinating, dysuria and flank pain.   Musculoskeletal: Positive for arthralgias, back pain, gait problem and myalgias. Negative for joint swelling.   Neurological: Negative for dizziness and headaches.   Psychiatric/Behavioral: Negative for agitation and sleep disturbance. The patient is not nervous/anxious.      Objective:     Vitals:    03/12/20 0945   BP: (!) 120/58   Pulse: 62   Temp: 97.8 °F (36.6 °C)   Weight: 82.6 kg (182 lb 1.6 oz)   Height: 6' (1.829 m)     Physical Exam   Constitutional: He appears well-developed and well-nourished.   HENT:   Head: Normocephalic and atraumatic.   Right Ear: Tympanic membrane normal.   Left Ear: Tympanic membrane normal.   Mouth/Throat: Oropharynx is clear and moist.   Eyes: Conjunctivae and EOM are normal.   Neck: Normal range of motion. Neck supple.   Cardiovascular: Normal rate and regular rhythm.   Pulmonary/Chest: Effort normal and breath sounds normal.   Musculoskeletal:        Right knee: He exhibits decreased range of motion. Tenderness found. Medial joint line tenderness noted.        Left knee: He exhibits decreased range of motion. Tenderness found. Medial joint line tenderness noted.        Lumbar back: He exhibits decreased range of motion, tenderness, bony tenderness and pain. He exhibits no spasm.   Skin: No  lesion noted. No erythema.   Psychiatric: He has a normal mood and affect. His speech is normal and behavior is normal.   Nursing note and vitals reviewed.    Assessment:     1. Routine general medical examination at a health care facility    2. Chronic midline back pain, unspecified back location    3. Osteoarthritis, unspecified osteoarthritis type, unspecified site    4. Chronic anticoagulation    5. Asthma in adult without complication, unspecified asthma severity, unspecified whether persistent    6. Coronary artery disease involving native coronary artery without angina pectoris, unspecified whether native or transplanted heart    7. Atrial fibrillation, chronic    8. Proximal muscle weakness    9. DDD (degenerative disc disease), lumbosacral    10. B12 deficiency    11. Chronic pain of both knees    12. Sacroiliitis    13. Coronary artery disease involving native coronary artery of native heart with angina pectoris    14. Atherosclerosis of aorta      Plan:   Claude was seen today for annual exam.    Diagnoses and all orders for this visit:    Routine general medical examination at a health care facility- labs reviewed. Discussed HM.     Chronic midline back pain, unspecified back location cont with gabapentin.   -     Comprehensive metabolic panel; Future  -     CBC auto differential; Future  -     Lipid panel; Future  -     Vitamin B12; Future    Osteoarthritis, unspecified osteoarthritis type, unspecified site cont with tylenol, activity and discussed topical therapies.   -     Comprehensive metabolic panel; Future  -     CBC auto differential; Future  -     Lipid panel; Future  -     Vitamin B12; Future    Chronic anticoagulation on coumadin through cards.   -     Comprehensive metabolic panel; Future  -     CBC auto differential; Future  -     Lipid panel; Future  -     Vitamin B12; Future    Asthma in adult without complication, unspecified asthma severity, unspecified whether persistent - stable,  Continue with current medications and interventions. Labs reviewed.     -     Comprehensive metabolic panel; Future  -     CBC auto differential; Future  -     Lipid panel; Future  -     Vitamin B12; Future    Coronary artery disease involving native coronary artery without angina pectoris, unspecified whether native or transplanted heart - stable, Continue with current medications and interventions. Labs reviewed.   -     Comprehensive metabolic panel; Future  -     CBC auto differential; Future  -     Lipid panel; Future  -     Vitamin B12; Future    Atrial fibrillation, chronic - stable, Continue with current medications and interventions. Labs reviewed.   -     Comprehensive metabolic panel; Future  -     CBC auto differential; Future  -     Lipid panel; Future  -     Vitamin B12; Future    Proximal muscle weakness - stable, Continue with current medications and interventions. Labs reviewed. -     Comprehensive metabolic panel; Future  -     CBC auto differential; Future  -     Lipid panel; Future  -     Vitamin B12; Future    DDD (degenerative disc disease), lumbosacral - stable, Continue with current medications and interventions. Labs reviewed.   -     Comprehensive metabolic panel; Future  -     CBC auto differential; Future  -     Lipid panel; Future  -     Vitamin B12; Future    B12 deficiency - stable, Continue with current medications and interventions. Labs reviewed.   -     Comprehensive metabolic panel; Future  -     CBC auto differential; Future  -     Lipid panel; Future  -     Vitamin B12; Future    Chronic pain of both knees - stable, Continue with current medications and interventions. Labs reviewed.   -     Comprehensive metabolic panel; Future  -     CBC auto differential; Future  -     Lipid panel; Future  -     Vitamin B12; Future    Sacroiliitis - stable, Continue with current medications and interventions. Labs reviewed.     Coronary artery disease involving native coronary artery of native  heart with angina pectoris - stable, Continue with current medications and interventions. Labs reviewed.     Atherosclerosis of aorta - stable, Continue with current medications and interventions. Labs reviewed.             No follow-ups on file.    There are no Patient Instructions on file for this visit.

## 2020-03-12 NOTE — PROGRESS NOTES
Patient, Claude L Taylor (MRN #[id number]), presented with a recent Platelet count less than 150 K/uL consistent with the definition of thrombocytopenia (ICD10 - D69.6).    Platelets   Date Value Ref Range Status   03/05/2020 143 (L) 150 - 350 K/uL Final     The patient's thrombocytopenia was monitored, evaluated, addressed and/or treated. This addendum to the medical record is made on 03/12/2020.

## 2020-06-17 ENCOUNTER — TELEPHONE (OUTPATIENT)
Dept: INTERNAL MEDICINE | Facility: CLINIC | Age: 85
End: 2020-06-17

## 2020-06-17 DIAGNOSIS — M54.9 CHRONIC MIDLINE BACK PAIN, UNSPECIFIED BACK LOCATION: Primary | ICD-10-CM

## 2020-06-17 DIAGNOSIS — G89.29 CHRONIC MIDLINE BACK PAIN, UNSPECIFIED BACK LOCATION: Primary | ICD-10-CM

## 2020-06-17 NOTE — TELEPHONE ENCOUNTER
----- Message from Sarah Ortega sent at 6/17/2020  3:28 PM CDT -----  Kimber is calling regarding an order for [facility]. Stated she would like to speak with office. Please call back at [phone]

## 2020-09-05 DIAGNOSIS — M48.061 SPINAL STENOSIS OF LUMBAR REGION AT MULTIPLE LEVELS: ICD-10-CM

## 2020-09-07 RX ORDER — GABAPENTIN 100 MG/1
200 CAPSULE ORAL 3 TIMES DAILY
Qty: 540 CAPSULE | Refills: 3 | Status: SHIPPED | OUTPATIENT
Start: 2020-09-07 | End: 2021-05-03

## 2020-09-08 ENCOUNTER — LAB VISIT (OUTPATIENT)
Dept: LAB | Facility: HOSPITAL | Age: 85
End: 2020-09-08
Attending: FAMILY MEDICINE
Payer: MEDICARE

## 2020-09-08 DIAGNOSIS — M25.562 CHRONIC PAIN OF BOTH KNEES: ICD-10-CM

## 2020-09-08 DIAGNOSIS — J45.909 ASTHMA IN ADULT WITHOUT COMPLICATION, UNSPECIFIED ASTHMA SEVERITY, UNSPECIFIED WHETHER PERSISTENT: ICD-10-CM

## 2020-09-08 DIAGNOSIS — M19.90 OSTEOARTHRITIS, UNSPECIFIED OSTEOARTHRITIS TYPE, UNSPECIFIED SITE: ICD-10-CM

## 2020-09-08 DIAGNOSIS — M25.561 CHRONIC PAIN OF BOTH KNEES: ICD-10-CM

## 2020-09-08 DIAGNOSIS — G89.29 CHRONIC PAIN OF BOTH KNEES: ICD-10-CM

## 2020-09-08 DIAGNOSIS — M62.81 PROXIMAL MUSCLE WEAKNESS: ICD-10-CM

## 2020-09-08 DIAGNOSIS — E53.8 B12 DEFICIENCY: ICD-10-CM

## 2020-09-08 DIAGNOSIS — G89.29 CHRONIC MIDLINE BACK PAIN, UNSPECIFIED BACK LOCATION: ICD-10-CM

## 2020-09-08 DIAGNOSIS — M51.37 DDD (DEGENERATIVE DISC DISEASE), LUMBOSACRAL: ICD-10-CM

## 2020-09-08 DIAGNOSIS — I25.10 CORONARY ARTERY DISEASE INVOLVING NATIVE CORONARY ARTERY WITHOUT ANGINA PECTORIS, UNSPECIFIED WHETHER NATIVE OR TRANSPLANTED HEART: ICD-10-CM

## 2020-09-08 DIAGNOSIS — M54.9 CHRONIC MIDLINE BACK PAIN, UNSPECIFIED BACK LOCATION: ICD-10-CM

## 2020-09-08 DIAGNOSIS — Z79.01 CHRONIC ANTICOAGULATION: ICD-10-CM

## 2020-09-08 DIAGNOSIS — I48.20 ATRIAL FIBRILLATION, CHRONIC: ICD-10-CM

## 2020-09-08 LAB
ALBUMIN SERPL BCP-MCNC: 3.7 G/DL (ref 3.5–5.2)
ALP SERPL-CCNC: 70 U/L (ref 55–135)
ALT SERPL W/O P-5'-P-CCNC: 8 U/L (ref 10–44)
ANION GAP SERPL CALC-SCNC: 8 MMOL/L (ref 8–16)
AST SERPL-CCNC: 17 U/L (ref 10–40)
BASOPHILS # BLD AUTO: 0.05 K/UL (ref 0–0.2)
BASOPHILS NFR BLD: 0.8 % (ref 0–1.9)
BILIRUB SERPL-MCNC: 1.5 MG/DL (ref 0.1–1)
BUN SERPL-MCNC: 16 MG/DL (ref 10–30)
CALCIUM SERPL-MCNC: 8.7 MG/DL (ref 8.7–10.5)
CHLORIDE SERPL-SCNC: 108 MMOL/L (ref 95–110)
CHOLEST SERPL-MCNC: 150 MG/DL (ref 120–199)
CHOLEST/HDLC SERPL: 2.7 {RATIO} (ref 2–5)
CO2 SERPL-SCNC: 24 MMOL/L (ref 23–29)
CREAT SERPL-MCNC: 0.9 MG/DL (ref 0.5–1.4)
DIFFERENTIAL METHOD: ABNORMAL
EOSINOPHIL # BLD AUTO: 0.3 K/UL (ref 0–0.5)
EOSINOPHIL NFR BLD: 4.9 % (ref 0–8)
ERYTHROCYTE [DISTWIDTH] IN BLOOD BY AUTOMATED COUNT: 15.1 % (ref 11.5–14.5)
EST. GFR  (AFRICAN AMERICAN): >60 ML/MIN/1.73 M^2
EST. GFR  (NON AFRICAN AMERICAN): >60 ML/MIN/1.73 M^2
GLUCOSE SERPL-MCNC: 85 MG/DL (ref 70–110)
HCT VFR BLD AUTO: 40.9 % (ref 40–54)
HDLC SERPL-MCNC: 55 MG/DL (ref 40–75)
HDLC SERPL: 36.7 % (ref 20–50)
HGB BLD-MCNC: 12.4 G/DL (ref 14–18)
IMM GRANULOCYTES # BLD AUTO: 0.02 K/UL (ref 0–0.04)
IMM GRANULOCYTES NFR BLD AUTO: 0.3 % (ref 0–0.5)
LDLC SERPL CALC-MCNC: 83.6 MG/DL (ref 63–159)
LYMPHOCYTES # BLD AUTO: 1.6 K/UL (ref 1–4.8)
LYMPHOCYTES NFR BLD: 24.7 % (ref 18–48)
MCH RBC QN AUTO: 30.4 PG (ref 27–31)
MCHC RBC AUTO-ENTMCNC: 30.3 G/DL (ref 32–36)
MCV RBC AUTO: 100 FL (ref 82–98)
MONOCYTES # BLD AUTO: 0.7 K/UL (ref 0.3–1)
MONOCYTES NFR BLD: 10.9 % (ref 4–15)
NEUTROPHILS # BLD AUTO: 3.8 K/UL (ref 1.8–7.7)
NEUTROPHILS NFR BLD: 58.4 % (ref 38–73)
NONHDLC SERPL-MCNC: 95 MG/DL
NRBC BLD-RTO: 0 /100 WBC
PLATELET # BLD AUTO: 160 K/UL (ref 150–350)
PMV BLD AUTO: 11.4 FL (ref 9.2–12.9)
POTASSIUM SERPL-SCNC: 4.5 MMOL/L (ref 3.5–5.1)
PROT SERPL-MCNC: 6.4 G/DL (ref 6–8.4)
RBC # BLD AUTO: 4.08 M/UL (ref 4.6–6.2)
SODIUM SERPL-SCNC: 140 MMOL/L (ref 136–145)
TRIGL SERPL-MCNC: 57 MG/DL (ref 30–150)
VIT B12 SERPL-MCNC: 613 PG/ML (ref 210–950)
WBC # BLD AUTO: 6.51 K/UL (ref 3.9–12.7)

## 2020-09-08 PROCEDURE — 80053 COMPREHEN METABOLIC PANEL: CPT

## 2020-09-08 PROCEDURE — 85025 COMPLETE CBC W/AUTO DIFF WBC: CPT

## 2020-09-08 PROCEDURE — 36415 COLL VENOUS BLD VENIPUNCTURE: CPT | Mod: PO

## 2020-09-08 PROCEDURE — 80061 LIPID PANEL: CPT

## 2020-09-08 PROCEDURE — 82607 VITAMIN B-12: CPT

## 2020-09-11 NOTE — PROGRESS NOTES
Progress Note - [address] 58 y o  male MRN: [id number]  Unit/Bed#: [facility] 220-01 Encounter: [id number]    Assessment/Plan   Principal Problem:    Schizoaffective disorder, bipolar type ([address] )  Active Problems:    Cannabis abuse, continuous      Behavior over the last 24 hours:  Unchanged  Patient seemed very manic and volatile last night that he had to be restrained and the police had to be called due to his manic symptoms  Patient continues to be extremely reluctant to take any medication restart the lithium but finally agreed to taking the Valium to help control the opal  Lithium level scheduled for tomorrow  Sleep: insomnia  Appetite: poor  Medication side effects: No  ROS: no complaints    Mental Status Evaluation:  Appearance:  disheveled   Behavior:  psychomotor agitation   Speech:  loud, pressured and tangential   Mood:  constricted   Affect:  inappropriate   Thought Process:  circumstantial   Thought Content:  normal   Perceptual Disturbances: None   Risk Potential: Suicidal Ideations none  Homicidal Ideations none  Potential for Aggression No   Sensorium:  person and place   Memory:  recent and remote memory grossly intact   Consciousness:  awake    Attention: attention span appeared shorter than expected for age   Insight:  limited   Judgment: limited   Gait/Station: normal balance   Motor Activity: no abnormal movements     Progress Toward Goals: ongoing      Recommended Treatment: Continue with group therapy, milieu therapy and occupational therapy  Risks, benefits and possible side effects of Medications:   Risks, benefits, and possible side effects of medications explained to patient and patient verbalizes understanding  Medications: continue current psychiatric medications  Labs: I have personally reviewed all pertinent laboratory/tests results  Counseling / Coordination of Care  Total floor / unit time spent today 25 minutes   Greater than 50% of total time was Subjective:      Patient ID: Claude L Taylor is a 89 y.o. male.    Chief Complaint: Establish Care    Disclaimer:  This note is prepared using voice recognition software and as such is likely to have errors and has not been proof read. Please contact me for questions.     Pt is changing pcps. Prior patient of Dr Lorenzo and then Dr. Stefania Corcoran at [facility]. Other specialists include:   Dr Morfin- derm  Dr Barrios- card  Dr Gorman- GI  Dr Parisi- urology  Dr Castelan- NSG/back/[facility]  Dr Corbin- morphine/back injections.   He comes in today bring me a medical history document which we will scan into his chart.  He has had an extensive history of 3 different back surgeries now suffering from chronic lower back pain with spinal stenosis lumbar facet arthritis, degenerative disc disease with multiple different interventions now currently only using a motorized scooter at all times.  He reports slightly his knees seem to be restless lies in the chair around 8:00 p.m. and he has to go to bed for relief.  He just can't relax.  He tried doing odd things and has done pain meds in the past but does not seem to be helpful.  At night his toes on both feet feel stiff and sometimes they hurt.  He will use a role on pain killer for some relief.  He often has cramps in his legs especially in the left ankle with some spasms.  From his back problems he has had 3 major surgeries surgeries all providing only short-term relief no long-term.  His current back situation that he has little or no problems was in better sitting.  Walking or standing presents back pain at the waistline on both sides of the spine and progresses as time goes on causing some additional stooping over to the spinal stenosis in the kidney area he is always tube does over even sitting.  He has either straight getting out of bed in the morning but that does not last very long.  He states over more the day progresses.  Walking is a problem within being stooped over and  causes some shortness of breath with any exercise.  There is no problems when he is riding his battery operated scooter or while working from the scooter.  He travels around the house and around his yd on it and works from there.    He also reports some urination problems with some symptoms of overactive bladder.  Is not too bad at night and normally has to only go twice during the night with good volume however during the day he gets a lot of urgency and then he almost has to go hourly without very little amount of volume.  It has been no better with Flomax is was last tried by his previous PCP.  He has had a PSA done last year which was at 1.4.  He finds that if he ends up trying to hold it during the urgency or passed and then the next hour her have more urinary volume.  He also reports that he is very low strength lifting with his arms overhead.  His legs are also very weak getting up off the floor is very difficult.  He has a 4-year-old report and disc of the lumbar spine CT done at [facility] in [place] which shows evidence of severe March degeneration changes of the lumbar spine multiple levels of disc issues areas of canal and foraminal narrowing as discussed above this remained retained catheter fragment a lower spinal canal.  There has been no significant change from 2013 to 2009 on the CT scan.  He is interested in possibly doing some physical therapy to help gain some strength.  He has also been very strong but his age has been diminishing his strength.    He does report that he uses MiraLax daily for crusting chronic constipation.  I do not doubt that also his back issues have contributed some to his constipation as well.    He does take vitamin-D 1000 IU supplement daily as well.    He is managed by Dr. Medrano for Coumadin for history of atrial fibrillation and with the place maker.  He has had a heart based ablation as well.  He has had open heart surgery by Dr. 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 Gorman and has been told that his last 1 was November 24, 2015 and did not have to do any more.    He has seen Dr. garcia the skin specialist for 2 skin cancers removed that were not malignant in January and April of this year.  He does INR testing with Dr. Medrano through [facility].        Lab Results   Component Value Date    WBC 7.82 04/04/2014    HGB 13.1 (L) 04/04/2014    HCT 41.4 04/04/2014     04/04/2014    CHOL 187 02/14/2013    TRIG 66 02/14/2013    HDL 67 02/14/2013    ALT 12 07/17/2013    AST 19 07/17/2013     03/28/2014    K 3.9 03/28/2014     03/28/2014    CREATININE 0.8 11/03/2015    BUN 14 03/28/2014    CO2 28 03/28/2014    TSH 0.716 11/03/2015    PSA 3.6 03/27/2014    INR 1.0 12/14/2015       Review of Systems   Constitutional: Positive for activity change and fatigue. Negative for appetite change, chills and unexpected weight change.   HENT: Negative for congestion, ear pain, postnasal drip, sneezing, sore throat and trouble swallowing.    Eyes: Negative for pain and visual disturbance.   Respiratory: Negative for cough and shortness of breath.    Cardiovascular: Negative for chest pain and leg swelling.   Gastrointestinal: Positive for constipation. Negative for abdominal pain, diarrhea, nausea and vomiting.   Endocrine: Negative for cold intolerance and heat intolerance.   Genitourinary: Positive for urgency. Negative for difficulty urinating, discharge, dysuria, flank pain, frequency and penile pain.   Musculoskeletal: Positive for arthralgias, back pain, gait problem and myalgias. Negative for joint swelling and neck pain.   Skin: Negative for color change and rash.   Neurological: Positive for weakness. Negative for dizziness, seizures and headaches.   Psychiatric/Behavioral: Positive for sleep  "disturbance. Negative for agitation, behavioral problems, confusion and dysphoric mood.     Objective:     Vitals:    05/23/18 1144   BP: 120/68   Pulse: 60   Temp: 98 °F (36.7 °C)   SpO2: 98%   Weight: 76 kg (167 lb 8.8 oz)   Height: 6' 1" (1.854 m)     Physical Exam   Constitutional: He is oriented to person, place, and time. He appears well-developed and well-nourished. No distress.   HENT:   Head: Normocephalic and atraumatic.   Right Ear: External ear normal.   Left Ear: External ear normal.   Nose: Nose normal.   Mouth/Throat: Oropharynx is clear and moist.   Eyes: EOM are normal. Pupils are equal, round, and reactive to light.   Neck: Normal range of motion. Neck supple. No thyromegaly present.   Cardiovascular: Normal rate and regular rhythm.  Exam reveals no gallop and no friction rub.    No murmur heard.  Pulmonary/Chest: Effort normal and breath sounds normal. No respiratory distress.   Abdominal: Soft. Bowel sounds are normal. He exhibits no distension. There is no tenderness. There is no rebound.   Genitourinary: Rectum normal and prostate normal. Rectal exam shows no external hemorrhoid, no internal hemorrhoid, no fissure, no mass, no tenderness, anal tone normal and guaiac negative stool. Prostate is not enlarged and not tender.   Musculoskeletal:        Cervical back: He exhibits decreased range of motion and bony tenderness.        Lumbar back: He exhibits decreased range of motion, bony tenderness, pain and spasm.   Stooped over posture with kyphosis   Lymphadenopathy:     He has no cervical adenopathy.   Neurological: He is alert and oriented to person, place, and time. Coordination normal.   Skin: Skin is warm and dry.   Psychiatric: He has a normal mood and affect. His speech is normal and behavior is normal. Thought content normal.   Vitals reviewed.    Assessment:     1. Spinal stenosis of lumbar region at multiple levels    2. DDD (degenerative disc disease), lumbosacral    3. Proximal muscle " weakness    4. Chronic midline back pain, unspecified back location    5. OAB (overactive bladder)    6. Prostate cancer screening    7. Chronic constipation    8. Atherosclerosis of aorta      Plan:   Claude was seen today for establish care.    Diagnoses and all orders for this visit:    Spinal stenosis of lumbar region at multiple levels-at this time of extensively reviewed his last CT scan from 2013 as well as extensive medical history list.  I believe he is doing very well without oral medications for his pain as he has been tried and failed multiple including spinal cord stimulators in oral medications as well.  I believe he would benefit from some physical therapy to assist him with also positioning as well to help some with overhead strengthening back training as well as some with helping some of the discomfort of spinal stenosis.  He gets around with the use of a mobility scooter and without this is unable to get around his house or do any activities of daily living.  For this reason needs to continue with his mobility scooter.  I put in a referral to [place] physical therapy due to the patient's preference.  -     Ambulatory Referral to Physical/Occupational Therapy  -     Comprehensive metabolic panel; Future  -     CBC auto differential; Future  -     Lipid panel; Future  -     TSH; Future  -     Urinalysis  -     Urine culture; Future  -     PSA, Screening; Future  -     Urine culture    DDD (degenerative disc disease), lumbosacral-at this time of extensively reviewed his last CT scan from 2013 as well as extensive medical history list.  I believe he is doing very well without oral medications for his pain as he has been tried and failed multiple including spinal cord stimulators in oral medications as well.  I believe he would benefit from some physical therapy to assist him with also positioning as well to help some with overhead strengthening back training as well as some with helping some of the  discomfort of spinal stenosis.  He gets around with the use of a mobility scooter and without this is unable to get around his house or do any activities of daily living.  For this reason needs to continue with his mobility scooter.  I put in a referral to [place] physical therapy due to the patient's preference.    -     Ambulatory Referral to Physical/Occupational Therapy  -     Comprehensive metabolic panel; Future  -     CBC auto differential; Future  -     Lipid panel; Future  -     TSH; Future  -     Urinalysis  -     Urine culture; Future  -     PSA, Screening; Future  -     Urine culture    Proximal muscle weakness-at this time of extensively reviewed his last CT scan from 2013 as well as extensive medical history list.  I believe he is doing very well without oral medications for his pain as he has been tried and failed multiple including spinal cord stimulators in oral medications as well.  I believe he would benefit from some physical therapy to assist him with also positioning as well to help some with overhead strengthening back training as well as some with helping some of the discomfort of spinal stenosis.  He gets around with the use of a mobility scooter and without this is unable to get around his house or do any activities of daily living.  For this reason needs to continue with his mobility scooter.  I put in a referral to [place] physical therapy due to the patient's preference.    -     Ambulatory Referral to Physical/Occupational Therapy  -     Comprehensive metabolic panel; Future  -     CBC auto differential; Future  -     Lipid panel; Future  -     TSH; Future  -     Urinalysis  -     Urine culture; Future  -     PSA, Screening; Future  -     Sedimentation rate, manual; Future  -     Urine culture    Chronic midline back pain, unspecified back location -at this time of extensively reviewed his last CT scan from 2013 as well as extensive medical history list.  I believe he is doing very  well without oral medications for his pain as he has been tried and failed multiple including spinal cord stimulators in oral medications as well.  I believe he would benefit from some physical therapy to assist him with also positioning as well to help some with overhead strengthening back training as well as some with helping some of the discomfort of spinal stenosis.  He gets around with the use of a mobility scooter and without this is unable to get around his house or do any activities of daily living.  For this reason needs to continue with his mobility scooter.  I put in a referral to [place] physical therapy due to the patient's preference.    -     Ambulatory Referral to Physical/Occupational Therapy  -     Comprehensive metabolic panel; Future  -     CBC auto differential; Future  -     Lipid panel; Future  -     TSH; Future  -     Urinalysis  -     Urine culture; Future  -     PSA, Screening; Future  -     Urine culture    OAB (overactive bladder)-new failed Flomax.  Prostate exam today normal.  Performed PSA.  Will start very low-dose oxybutynin 5 mg only once during the daytime.  Discussed possibilities of anticholinergic effects including confusion and mental disorientation.  Also we discussed if he has any urinary retention issues he is to stop it as well.  Otherwise he will try this medication to see if it is helpful.  Has used Dr. Parisi in the past for urologist.  -     Comprehensive metabolic panel; Future  -     CBC auto differential; Future  -     Lipid panel; Future  -     TSH; Future  -     Urinalysis  -     Urine culture; Future  -     PSA, Screening; Future  -     Urine culture    Prostate cancer screening-no masses today on exam PSA last was 1.4 at [facility] in 2017.  Performed today.  -     Comprehensive metabolic panel; Future  -     CBC auto differential; Future  -     Lipid panel; Future  -     TSH; Future  -     Urinalysis  -     Urine culture; Future  -     PSA, Screening;  Future  -     Urine culture    Chronic constipation-stable with MiraLax but also contributing is is lower back issues  -     Comprehensive metabolic panel; Future  -     CBC auto differential; Future  -     Lipid panel; Future  -     TSH; Future  -     Urinalysis  -     Urine culture; Future  -     PSA, Screening; Future  -     Urine culture    Atherosclerosis of aorta-patient declines wanting to be on cholesterol medicine due to his age.  Does see Dr. Medrano.  Extensive history of coronary artery bypass and also valve replacement.  Of the mitral valve.  This is prosthetic.  -     Comprehensive metabolic panel; Future  -     CBC auto differential; Future  -     Lipid panel; Future  -     TSH; Future  -     Urinalysis  -     Urine culture; Future  -     PSA, Screening; Future  -     Urine culture    Other orders  -     oxybutynin (DITROPAN) 5 MG Tab; Take 1 tablet (5 mg total) by mouth once daily. For overactive bladder            Follow-up in about 5 weeks (around 6/27/2018), or if symptoms worsen or fail to improve, for f/u PT, and OAB meds.

## 2020-11-02 ENCOUNTER — OFFICE VISIT (OUTPATIENT)
Dept: PRIMARY CARE CLINIC | Facility: CLINIC | Age: 85
End: 2020-11-02
Payer: MEDICARE

## 2020-11-02 VITALS
WEIGHT: 180.56 LBS | DIASTOLIC BLOOD PRESSURE: 70 MMHG | SYSTOLIC BLOOD PRESSURE: 140 MMHG | BODY MASS INDEX: 24.49 KG/M2 | TEMPERATURE: 98 F | OXYGEN SATURATION: 99 % | HEART RATE: 65 BPM

## 2020-11-02 DIAGNOSIS — I48.20 ATRIAL FIBRILLATION, CHRONIC: ICD-10-CM

## 2020-11-02 DIAGNOSIS — Z95.2 HX OF PROSTHETIC MITRAL VALVE: ICD-10-CM

## 2020-11-02 DIAGNOSIS — I25.119 CORONARY ARTERY DISEASE INVOLVING NATIVE CORONARY ARTERY OF NATIVE HEART WITH ANGINA PECTORIS: ICD-10-CM

## 2020-11-02 DIAGNOSIS — M19.90 OSTEOARTHRITIS, UNSPECIFIED OSTEOARTHRITIS TYPE, UNSPECIFIED SITE: ICD-10-CM

## 2020-11-02 DIAGNOSIS — I73.9 PERIPHERAL VASCULAR DISEASE: ICD-10-CM

## 2020-11-02 DIAGNOSIS — M51.37 DDD (DEGENERATIVE DISC DISEASE), LUMBOSACRAL: ICD-10-CM

## 2020-11-02 DIAGNOSIS — N32.81 OAB (OVERACTIVE BLADDER): ICD-10-CM

## 2020-11-02 DIAGNOSIS — Z95.2 MITRAL VALVE REPLACED: ICD-10-CM

## 2020-11-02 DIAGNOSIS — G89.29 CHRONIC MIDLINE BACK PAIN, UNSPECIFIED BACK LOCATION: ICD-10-CM

## 2020-11-02 DIAGNOSIS — M48.061 SPINAL STENOSIS OF LUMBAR REGION AT MULTIPLE LEVELS: Primary | ICD-10-CM

## 2020-11-02 DIAGNOSIS — E53.8 B12 DEFICIENCY DUE TO DIET: ICD-10-CM

## 2020-11-02 DIAGNOSIS — Z79.01 CHRONIC ANTICOAGULATION: ICD-10-CM

## 2020-11-02 DIAGNOSIS — M62.81 PROXIMAL MUSCLE WEAKNESS: ICD-10-CM

## 2020-11-02 DIAGNOSIS — M54.9 CHRONIC MIDLINE BACK PAIN, UNSPECIFIED BACK LOCATION: ICD-10-CM

## 2020-11-02 DIAGNOSIS — Z95.0 CARDIAC PACEMAKER: ICD-10-CM

## 2020-11-02 PROBLEM — I25.10 CORONARY ARTERY DISEASE INVOLVING NATIVE CORONARY ARTERY OF NATIVE HEART WITHOUT ANGINA PECTORIS: Status: ACTIVE | Noted: 2017-10-05

## 2020-11-02 PROBLEM — I48.21 PERMANENT ATRIAL FIBRILLATION: Status: ACTIVE | Noted: 2017-10-05

## 2020-11-02 PROBLEM — I34.0 NONRHEUMATIC MITRAL (VALVE) INSUFFICIENCY: Status: ACTIVE | Noted: 2018-07-31

## 2020-11-02 PROBLEM — M17.0 PRIMARY OSTEOARTHRITIS OF BOTH KNEES: Status: ACTIVE | Noted: 2017-07-14

## 2020-11-02 PROBLEM — I44.2 COMPLETE AV BLOCK: Status: ACTIVE | Noted: 2019-05-17

## 2020-11-02 PROCEDURE — 1101F PT FALLS ASSESS-DOCD LE1/YR: CPT | Mod: CPTII,S$GLB,, | Performed by: FAMILY MEDICINE

## 2020-11-02 PROCEDURE — 1126F PR PAIN SEVERITY QUANTIFIED, NO PAIN PRESENT: ICD-10-PCS | Mod: S$GLB,,, | Performed by: FAMILY MEDICINE

## 2020-11-02 PROCEDURE — 1101F PR PT FALLS ASSESS DOC 0-1 FALLS W/OUT INJ PAST YR: ICD-10-PCS | Mod: CPTII,S$GLB,, | Performed by: FAMILY MEDICINE

## 2020-11-02 PROCEDURE — 99499 UNLISTED E&M SERVICE: CPT | Mod: S$GLB,,, | Performed by: FAMILY MEDICINE

## 2020-11-02 PROCEDURE — 90694 FLU VACCINE - QUADRIVALENT - ADJUVANTED: ICD-10-PCS | Mod: S$GLB,,, | Performed by: FAMILY MEDICINE

## 2020-11-02 PROCEDURE — G0008 FLU VACCINE - QUADRIVALENT - ADJUVANTED: ICD-10-PCS | Mod: S$GLB,,, | Performed by: FAMILY MEDICINE

## 2020-11-02 PROCEDURE — 1159F PR MEDICATION LIST DOCUMENTED IN MEDICAL RECORD: ICD-10-PCS | Mod: S$GLB,,, | Performed by: FAMILY MEDICINE

## 2020-11-02 PROCEDURE — 1159F MED LIST DOCD IN RCRD: CPT | Mod: S$GLB,,, | Performed by: FAMILY MEDICINE

## 2020-11-02 PROCEDURE — 99499 RISK ADDL DX/OHS AUDIT: ICD-10-PCS | Mod: S$GLB,,, | Performed by: FAMILY MEDICINE

## 2020-11-02 PROCEDURE — 99215 PR OFFICE/OUTPT VISIT, EST, LEVL V, 40-54 MIN: ICD-10-PCS | Mod: 25,S$GLB,, | Performed by: FAMILY MEDICINE

## 2020-11-02 PROCEDURE — G0008 ADMIN INFLUENZA VIRUS VAC: HCPCS | Mod: S$GLB,,, | Performed by: FAMILY MEDICINE

## 2020-11-02 PROCEDURE — 99999 PR PBB SHADOW E&M-EST. PATIENT-LVL V: CPT | Mod: PBBFAC,,, | Performed by: FAMILY MEDICINE

## 2020-11-02 PROCEDURE — 99215 OFFICE O/P EST HI 40 MIN: CPT | Mod: 25,S$GLB,, | Performed by: FAMILY MEDICINE

## 2020-11-02 PROCEDURE — 1126F AMNT PAIN NOTED NONE PRSNT: CPT | Mod: S$GLB,,, | Performed by: FAMILY MEDICINE

## 2020-11-02 PROCEDURE — 90694 VACC AIIV4 NO PRSRV 0.5ML IM: CPT | Mod: S$GLB,,, | Performed by: FAMILY MEDICINE

## 2020-11-02 PROCEDURE — 99999 PR PBB SHADOW E&M-EST. PATIENT-LVL V: ICD-10-PCS | Mod: PBBFAC,,, | Performed by: FAMILY MEDICINE

## 2020-12-07 RX ORDER — OXYBUTYNIN CHLORIDE 5 MG/1
5 TABLET ORAL DAILY
Qty: 90 TABLET | Refills: 3 | Status: SHIPPED | OUTPATIENT
Start: 2020-12-07 | End: 2022-02-08 | Stop reason: SDUPTHER

## 2020-12-07 RX ORDER — TAMSULOSIN HYDROCHLORIDE 0.4 MG/1
0.8 CAPSULE ORAL NIGHTLY
Qty: 180 CAPSULE | Refills: 3 | Status: SHIPPED | OUTPATIENT
Start: 2020-12-07 | End: 2022-02-08 | Stop reason: SDUPTHER

## 2020-12-28 ENCOUNTER — OFFICE VISIT (OUTPATIENT)
Dept: PODIATRY | Facility: CLINIC | Age: 85
End: 2020-12-28
Payer: MEDICARE

## 2020-12-28 VITALS — BODY MASS INDEX: 24.74 KG/M2 | WEIGHT: 182.63 LBS | HEIGHT: 72 IN

## 2020-12-28 DIAGNOSIS — G25.81 RESTLESS LEG SYNDROME: Primary | ICD-10-CM

## 2020-12-28 DIAGNOSIS — M20.41 HAMMERTOES OF BOTH FEET: ICD-10-CM

## 2020-12-28 DIAGNOSIS — L90.9 PLANTAR FAT PAD ATROPHY: ICD-10-CM

## 2020-12-28 DIAGNOSIS — M20.42 HAMMERTOES OF BOTH FEET: ICD-10-CM

## 2020-12-28 DIAGNOSIS — Z79.01 LONG TERM (CURRENT) USE OF ANTICOAGULANTS: ICD-10-CM

## 2020-12-28 PROCEDURE — 3288F FALL RISK ASSESSMENT DOCD: CPT | Mod: CPTII,S$GLB,, | Performed by: PODIATRIST

## 2020-12-28 PROCEDURE — 1126F AMNT PAIN NOTED NONE PRSNT: CPT | Mod: S$GLB,,, | Performed by: PODIATRIST

## 2020-12-28 PROCEDURE — 99999 PR PBB SHADOW E&M-EST. PATIENT-LVL III: ICD-10-PCS | Mod: PBBFAC,,, | Performed by: PODIATRIST

## 2020-12-28 PROCEDURE — 1101F PR PT FALLS ASSESS DOC 0-1 FALLS W/OUT INJ PAST YR: ICD-10-PCS | Mod: CPTII,S$GLB,, | Performed by: PODIATRIST

## 2020-12-28 PROCEDURE — 1159F MED LIST DOCD IN RCRD: CPT | Mod: S$GLB,,, | Performed by: PODIATRIST

## 2020-12-28 PROCEDURE — 1159F PR MEDICATION LIST DOCUMENTED IN MEDICAL RECORD: ICD-10-PCS | Mod: S$GLB,,, | Performed by: PODIATRIST

## 2020-12-28 PROCEDURE — 99203 PR OFFICE/OUTPT VISIT, NEW, LEVL III, 30-44 MIN: ICD-10-PCS | Mod: S$GLB,,, | Performed by: PODIATRIST

## 2020-12-28 PROCEDURE — 1126F PR PAIN SEVERITY QUANTIFIED, NO PAIN PRESENT: ICD-10-PCS | Mod: S$GLB,,, | Performed by: PODIATRIST

## 2020-12-28 PROCEDURE — 99999 PR PBB SHADOW E&M-EST. PATIENT-LVL III: CPT | Mod: PBBFAC,,, | Performed by: PODIATRIST

## 2020-12-28 PROCEDURE — 3288F PR FALLS RISK ASSESSMENT DOCUMENTED: ICD-10-PCS | Mod: CPTII,S$GLB,, | Performed by: PODIATRIST

## 2020-12-28 PROCEDURE — 99203 OFFICE O/P NEW LOW 30 MIN: CPT | Mod: S$GLB,,, | Performed by: PODIATRIST

## 2020-12-28 PROCEDURE — 1101F PT FALLS ASSESS-DOCD LE1/YR: CPT | Mod: CPTII,S$GLB,, | Performed by: PODIATRIST

## 2020-12-28 NOTE — PROGRESS NOTES
"Subjective:       Patient ID: Claude L Taylor is a 89 y.o. male.    Chief Complaint: Foot Pain (Pt c/o pain in the ball aspect of feet. Pt describes tightness feeling in feet when bending toes. He states pain 8/10 at night. Wears casual shoes w/ socks. Non diabetic pt. PCP Dr Romo.)    HPI: Claude L Taylor presents to the office today with concerns of pain to the ball of the right foot and the left foot.  States that pain is at its worst at night.  States 8/10 pain while lying in bed.  Reports that this does wake him up.  States that with walking and ambulation, the pain does improve.  Currently being managed by Dr. Romo for restless leg syndrome of the lower legs.  States that he is taking gabapentin for this.  Denies any recent injury or trauma.    Review of patient's allergies indicates:   Allergen Reactions    Oxycodone      "it's mental/emotional       Past Medical History:   Diagnosis Date    *Atrial fibrillation     pacer Dr Barrios    Anticoagulant long-term use     Cancer     skin    Cervical neck pain with evidence of disc disease     Depression     Diverticulosis     Kidney stone     Skin cancer        Family History   Problem Relation Age of Onset    Cancer Father     Colon cancer Neg Hx        Social History     Socioeconomic History    Marital status:      Spouse name: marguerite Barron    Number of children: Not on file    Years of education: Not on file    Highest education level: Not on file   Occupational History    Not on file   Social Needs    Financial resource strain: Not on file    Food insecurity     Worry: Not on file     Inability: Not on file    Transportation needs     Medical: Not on file     Non-medical: Not on file   Tobacco Use    Smoking status: Never Smoker    Smokeless tobacco: Never Used   Substance and Sexual Activity    Alcohol use: Yes     Alcohol/week: 1.0 standard drinks     Types: 1 Glasses of wine per week     Comment: every 2-3 days    Drug use: No "    Sexual activity: Not on file   Lifestyle    Physical activity     Days per week: Not on file     Minutes per session: Not on file    Stress: Not on file   Relationships    Social connections     Talks on phone: Not on file     Gets together: Not on file     Attends Latter day service: Not on file     Active member of club or organization: Not on file     Attends meetings of clubs or organizations: Not on file     Relationship status: Not on file   Other Topics Concern    Not on file   Social History Narrative    Not on file       Past Surgical History:   Procedure Laterality Date    back fusion      CARDIAC PACEMAKER PLACEMENT      CHOLECYSTECTOMY      COLONOSCOPY  3/22/2013    COLONOSCOPY N/A 12/14/2015    Procedure: COLONOSCOPY;  Surgeon: Gonzalo Gorman MD;  [facility];  Service: Endoscopy;  Laterality: N/A;    CORONARY ARTERY BYPASS GRAFT      ELBOW BURSA SURGERY      left    EYE SURGERY      FOOT SURGERY      MITRAL VALVE REPLACEMENT      REPLACEMENT OF PACEMAKER  09/2019    SHOULDER SURGERY      SKIN CANCER EXCISION  01/2018/   04/2018 Dr.Thorla Jones    lower left leg     TONSILLECTOMY         Review of Systems   Constitutional: Negative for activity change, appetite change, chills and fever.   HENT: Negative for sinus pain, sore throat and voice change.    Eyes: Negative for pain, redness and visual disturbance.   Respiratory: Negative for cough and shortness of breath.    Cardiovascular: Negative for chest pain and palpitations.   Gastrointestinal: Negative for diarrhea, nausea and vomiting.   Musculoskeletal: Negative for back pain and joint swelling.   Skin: Negative for color change and wound.   Neurological: Negative for dizziness, weakness and numbness.   Psychiatric/Behavioral: The patient is not nervous/anxious.           Objective:   Ht 6' (1.829 m)   Wt 82.9 kg (182 lb 10.4 oz)   BMI 24.77 kg/m²     Color Fundus Photography - OU - Both Eyes  Baseline SDP for nevus  OS        Physical Exam   LOWER EXTREMITY PHYSICAL EXAMINATION    Vascular:  Dorsalis pedis pulse on the right 2/4, on the left 2/4.  Posterior tibial pulse on the right 2/4, on the left 2/4.  Capillary refill intact less than 3 sec of bilateral lower extremities.  Pedal hair growth is present to the dorsal aspect of the foot and digits bilaterally.  No presence of edema to lower extremities. Rubor on dependency is noted.     Neurological:  Protective sensation is intact with [place] Shaheen monofilament. Proprioception is intact. Intact sensation to light touch. No neurological symptoms noted on palpation of interspace with radiating sensations proximally or distally. Tinel's and Valliex's sign is negative. No neurological symptoms with compression of metatarsal heads. No numbness or tingling reported on examination.     Musculoskeletal:  No pain on palpation of the metatarsophalangeal joints.  No reproducible pain with range of motion.  No pain with palpating the inner metatarsal spaces.  Manual Muscle Testing is 5/5 with dorsiflexion, plantar flexion, abduction, and adduction.   There is normal range of motion in the forefoot, hindfoot, and Ankle joint.      Dermatological:  Skin is thin, shiny, and atrophic.  There are no ulcerations, calluses, or lesions noted to the dorsal or plantar aspect of the bilateral feet.   Hair growth is present.    Assessment:     1. Restless leg syndrome    2. Plantar fat pad atrophy    3. Hammertoes of both feet    4. Long term (current) use of anticoagulants        Plan:     Restless leg syndrome    Plantar fat pad atrophy    Hammertoes of both feet    Long term (current) use of anticoagulants        Patient experiencing pain associated with restless leg syndrome.  Currently being treated on gabapentin.  Would recommend consideration of ropinirole as alternative but will defer this to primary care who is managing his other comorbid state.    Discussed with patient that with  normal aging, he developed plantar fat pad atrophy associated with loss of plantar fat pad.  This causes the relative distance of the metatarsal head to be closer to the weight-bearing surface.  Patient also showing signs of hammertoe deformity.     This patient does have hammertoe (digital) contractures. I did advise the patient to ambulate with shoe gear that is high in the tox box to allow for extra room and depth in the sagittal plane, in order to alleviate and lessen the potential for dorsal digital break down at the IPJs. I do also recommended shoe gear that is soft and supple in the foot bed as to lessen the potential for plantar distal digital break down at the contracted digits. If the patient does not feel the aforementioned is necessary, he or she may also purchase OTC padding devices to be worn across the MTPJ, at the distal aspects of the digits, and/or at the dorsal aspects of the IPJs. The patient does acknowledge understanding and is said to be amenable to compliance.     Currently on long-term anticoagulation therapy.  Continue current medication        Future Appointments   Date Time Provider Department Center   5/3/2021 10:20 AM Izzy Romo MD [facility]

## 2021-01-09 ENCOUNTER — IMMUNIZATION (OUTPATIENT)
Dept: INTERNAL MEDICINE | Facility: CLINIC | Age: 86
End: 2021-01-09
Payer: MEDICARE

## 2021-01-09 DIAGNOSIS — Z23 NEED FOR VACCINATION: ICD-10-CM

## 2021-01-09 PROCEDURE — 91300 COVID-19, MRNA, LNP-S, PF, 30 MCG/0.3 ML DOSE VACCINE: CPT | Mod: PBBFAC | Performed by: FAMILY MEDICINE

## 2021-01-21 ENCOUNTER — PATIENT MESSAGE (OUTPATIENT)
Dept: PRIMARY CARE CLINIC | Facility: CLINIC | Age: 86
End: 2021-01-21

## 2021-01-21 DIAGNOSIS — I48.20 ATRIAL FIBRILLATION, CHRONIC: ICD-10-CM

## 2021-01-21 DIAGNOSIS — I73.9 PERIPHERAL VASCULAR DISEASE: ICD-10-CM

## 2021-01-21 DIAGNOSIS — N32.81 OAB (OVERACTIVE BLADDER): ICD-10-CM

## 2021-01-21 DIAGNOSIS — M54.9 CHRONIC MIDLINE BACK PAIN, UNSPECIFIED BACK LOCATION: ICD-10-CM

## 2021-01-21 DIAGNOSIS — M48.061 SPINAL STENOSIS OF LUMBAR REGION AT MULTIPLE LEVELS: Primary | ICD-10-CM

## 2021-01-21 DIAGNOSIS — G89.29 CHRONIC MIDLINE BACK PAIN, UNSPECIFIED BACK LOCATION: ICD-10-CM

## 2021-01-21 DIAGNOSIS — I25.119 CORONARY ARTERY DISEASE INVOLVING NATIVE CORONARY ARTERY OF NATIVE HEART WITH ANGINA PECTORIS: ICD-10-CM

## 2021-01-21 DIAGNOSIS — Z79.01 CHRONIC ANTICOAGULATION: ICD-10-CM

## 2021-01-21 DIAGNOSIS — E53.8 B12 DEFICIENCY DUE TO DIET: ICD-10-CM

## 2021-01-21 DIAGNOSIS — M19.90 OSTEOARTHRITIS, UNSPECIFIED OSTEOARTHRITIS TYPE, UNSPECIFIED SITE: ICD-10-CM

## 2021-01-25 ENCOUNTER — PATIENT MESSAGE (OUTPATIENT)
Dept: PRIMARY CARE CLINIC | Facility: CLINIC | Age: 86
End: 2021-01-25

## 2021-01-30 ENCOUNTER — IMMUNIZATION (OUTPATIENT)
Dept: INTERNAL MEDICINE | Facility: CLINIC | Age: 86
End: 2021-01-30
Payer: MEDICARE

## 2021-01-30 DIAGNOSIS — Z23 NEED FOR VACCINATION: Primary | ICD-10-CM

## 2021-01-30 PROCEDURE — 91300 COVID-19, MRNA, LNP-S, PF, 30 MCG/0.3 ML DOSE VACCINE: CPT | Mod: PBBFAC | Performed by: FAMILY MEDICINE

## 2021-01-30 PROCEDURE — 0002A COVID-19, MRNA, LNP-S, PF, 30 MCG/0.3 ML DOSE VACCINE: CPT | Mod: PBBFAC | Performed by: FAMILY MEDICINE

## 2021-03-15 ENCOUNTER — OFFICE VISIT (OUTPATIENT)
Dept: OPHTHALMOLOGY | Facility: CLINIC | Age: 86
End: 2021-03-15
Payer: MEDICARE

## 2021-03-15 DIAGNOSIS — H01.003 BLEPHARITIS OF BOTH EYES, UNSPECIFIED EYELID, UNSPECIFIED TYPE: ICD-10-CM

## 2021-03-15 DIAGNOSIS — H16.201 KERATOCONJUNCTIVITIS, RIGHT: ICD-10-CM

## 2021-03-15 DIAGNOSIS — H01.006 BLEPHARITIS OF BOTH EYES, UNSPECIFIED EYELID, UNSPECIFIED TYPE: ICD-10-CM

## 2021-03-15 DIAGNOSIS — H40.1124 PRIMARY OPEN ANGLE GLAUCOMA (POAG) OF LEFT EYE, INDETERMINATE STAGE: Primary | ICD-10-CM

## 2021-03-15 PROCEDURE — 99999 PR PBB SHADOW E&M-EST. PATIENT-LVL III: ICD-10-PCS | Mod: PBBFAC,,, | Performed by: OPTOMETRIST

## 2021-03-15 PROCEDURE — 92133 POSTERIOR SEGMENT OCT OPTIC NERVE(OCULAR COHERENCE TOMOGRAPHY) - OU - BOTH EYES: ICD-10-PCS | Mod: S$GLB,,, | Performed by: OPTOMETRIST

## 2021-03-15 PROCEDURE — 92002 PR EYE EXAM, NEW PATIENT,INTERMED: ICD-10-PCS | Mod: S$GLB,,, | Performed by: OPTOMETRIST

## 2021-03-15 PROCEDURE — 99999 PR PBB SHADOW E&M-EST. PATIENT-LVL III: CPT | Mod: PBBFAC,,, | Performed by: OPTOMETRIST

## 2021-03-15 PROCEDURE — 92002 INTRM OPH EXAM NEW PATIENT: CPT | Mod: S$GLB,,, | Performed by: OPTOMETRIST

## 2021-03-15 PROCEDURE — 92133 CPTRZD OPH DX IMG PST SGM ON: CPT | Mod: S$GLB,,, | Performed by: OPTOMETRIST

## 2021-03-15 RX ORDER — TOBRAMYCIN AND DEXAMETHASONE 3; 1 MG/ML; MG/ML
1 SUSPENSION/ DROPS OPHTHALMIC 4 TIMES DAILY
Qty: 5 ML | Refills: 1 | Status: SHIPPED | OUTPATIENT
Start: 2021-03-15 | End: 2021-03-23 | Stop reason: ALTCHOICE

## 2021-03-15 RX ORDER — LATANOPROST 50 UG/ML
1 SOLUTION/ DROPS OPHTHALMIC NIGHTLY
Qty: 2.5 ML | Refills: 4 | Status: SHIPPED | OUTPATIENT
Start: 2021-03-15 | End: 2021-05-03

## 2021-03-23 ENCOUNTER — OFFICE VISIT (OUTPATIENT)
Dept: OPHTHALMOLOGY | Facility: CLINIC | Age: 86
End: 2021-03-23
Payer: MEDICARE

## 2021-03-23 DIAGNOSIS — S05.01XA ABRASION OF RIGHT CORNEA, INITIAL ENCOUNTER: Primary | ICD-10-CM

## 2021-03-23 PROCEDURE — 92012 INTRM OPH EXAM EST PATIENT: CPT | Mod: S$GLB,,, | Performed by: OPTOMETRIST

## 2021-03-23 PROCEDURE — 92012 PR EYE EXAM, EST PATIENT,INTERMED: ICD-10-PCS | Mod: S$GLB,,, | Performed by: OPTOMETRIST

## 2021-03-23 PROCEDURE — 99999 PR PBB SHADOW E&M-EST. PATIENT-LVL III: CPT | Mod: PBBFAC,,, | Performed by: OPTOMETRIST

## 2021-03-23 PROCEDURE — 99999 PR PBB SHADOW E&M-EST. PATIENT-LVL III: ICD-10-PCS | Mod: PBBFAC,,, | Performed by: OPTOMETRIST

## 2021-03-23 RX ORDER — ERYTHROMYCIN 5 MG/G
OINTMENT OPHTHALMIC 3 TIMES DAILY
Qty: 1 TUBE | Refills: 0 | Status: SHIPPED | OUTPATIENT
Start: 2021-03-23 | End: 2021-03-29

## 2021-03-29 ENCOUNTER — OFFICE VISIT (OUTPATIENT)
Dept: OPHTHALMOLOGY | Facility: CLINIC | Age: 86
End: 2021-03-29
Payer: MEDICARE

## 2021-03-29 DIAGNOSIS — H16.041 MARGINAL CORNEAL ULCER OF RIGHT EYE: Primary | ICD-10-CM

## 2021-03-29 DIAGNOSIS — H01.02B SQUAMOUS BLEPHARITIS OF UPPER AND LOWER EYELIDS OF BOTH EYES: ICD-10-CM

## 2021-03-29 DIAGNOSIS — H01.02A SQUAMOUS BLEPHARITIS OF UPPER AND LOWER EYELIDS OF BOTH EYES: ICD-10-CM

## 2021-03-29 PROCEDURE — 99999 PR PBB SHADOW E&M-EST. PATIENT-LVL III: ICD-10-PCS | Mod: PBBFAC,,, | Performed by: OPTOMETRIST

## 2021-03-29 PROCEDURE — 99213 PR OFFICE/OUTPT VISIT, EST, LEVL III, 20-29 MIN: ICD-10-PCS | Mod: S$GLB,,, | Performed by: OPTOMETRIST

## 2021-03-29 PROCEDURE — 99999 PR PBB SHADOW E&M-EST. PATIENT-LVL III: CPT | Mod: PBBFAC,,, | Performed by: OPTOMETRIST

## 2021-03-29 PROCEDURE — 1159F MED LIST DOCD IN RCRD: CPT | Mod: S$GLB,,, | Performed by: OPTOMETRIST

## 2021-03-29 PROCEDURE — 1159F PR MEDICATION LIST DOCUMENTED IN MEDICAL RECORD: ICD-10-PCS | Mod: S$GLB,,, | Performed by: OPTOMETRIST

## 2021-03-29 PROCEDURE — 99213 OFFICE O/P EST LOW 20 MIN: CPT | Mod: S$GLB,,, | Performed by: OPTOMETRIST

## 2021-03-29 RX ORDER — MOXIFLOXACIN 5 MG/ML
1 SOLUTION/ DROPS OPHTHALMIC 4 TIMES DAILY
Qty: 3 ML | Refills: 0 | Status: SHIPPED | OUTPATIENT
Start: 2021-03-29 | End: 2021-04-06

## 2021-04-01 ENCOUNTER — TELEPHONE (OUTPATIENT)
Dept: OPHTHALMOLOGY | Facility: CLINIC | Age: 86
End: 2021-04-01

## 2021-04-07 ENCOUNTER — OFFICE VISIT (OUTPATIENT)
Dept: OPHTHALMOLOGY | Facility: CLINIC | Age: 86
End: 2021-04-07
Payer: MEDICARE

## 2021-04-07 DIAGNOSIS — H40.1124 PRIMARY OPEN ANGLE GLAUCOMA (POAG) OF LEFT EYE, INDETERMINATE STAGE: ICD-10-CM

## 2021-04-07 DIAGNOSIS — H01.02B SQUAMOUS BLEPHARITIS OF UPPER AND LOWER EYELIDS OF BOTH EYES: ICD-10-CM

## 2021-04-07 DIAGNOSIS — H01.02A SQUAMOUS BLEPHARITIS OF UPPER AND LOWER EYELIDS OF BOTH EYES: ICD-10-CM

## 2021-04-07 DIAGNOSIS — H16.223 KERATOCONJUNCTIVITIS SICCA OF BOTH EYES NOT SPECIFIED AS SJOGREN'S: Primary | ICD-10-CM

## 2021-04-07 PROCEDURE — 1159F MED LIST DOCD IN RCRD: CPT | Mod: S$GLB,,, | Performed by: OPTOMETRIST

## 2021-04-07 PROCEDURE — 99213 OFFICE O/P EST LOW 20 MIN: CPT | Mod: S$GLB,,, | Performed by: OPTOMETRIST

## 2021-04-07 PROCEDURE — 99213 PR OFFICE/OUTPT VISIT, EST, LEVL III, 20-29 MIN: ICD-10-PCS | Mod: S$GLB,,, | Performed by: OPTOMETRIST

## 2021-04-07 PROCEDURE — 99499 RISK ADDL DX/OHS AUDIT: ICD-10-PCS | Mod: S$GLB,,, | Performed by: OPTOMETRIST

## 2021-04-07 PROCEDURE — 1159F PR MEDICATION LIST DOCUMENTED IN MEDICAL RECORD: ICD-10-PCS | Mod: S$GLB,,, | Performed by: OPTOMETRIST

## 2021-04-07 PROCEDURE — 99999 PR PBB SHADOW E&M-EST. PATIENT-LVL III: ICD-10-PCS | Mod: PBBFAC,,, | Performed by: OPTOMETRIST

## 2021-04-07 PROCEDURE — 99999 PR PBB SHADOW E&M-EST. PATIENT-LVL III: CPT | Mod: PBBFAC,,, | Performed by: OPTOMETRIST

## 2021-04-07 PROCEDURE — 99499 UNLISTED E&M SERVICE: CPT | Mod: S$GLB,,, | Performed by: OPTOMETRIST

## 2021-04-07 RX ORDER — FLUOROMETHOLONE 1 MG/ML
1 SUSPENSION/ DROPS OPHTHALMIC 2 TIMES DAILY
Qty: 5 ML | Refills: 0 | Status: SHIPPED | OUTPATIENT
Start: 2021-04-07 | End: 2021-10-06 | Stop reason: ALTCHOICE

## 2021-04-07 RX ORDER — CYCLOSPORINE 0.5 MG/ML
1 EMULSION OPHTHALMIC 2 TIMES DAILY
Qty: 60 EACH | Refills: 11 | Status: SHIPPED | OUTPATIENT
Start: 2021-04-07 | End: 2021-04-07

## 2021-04-07 RX ORDER — CYCLOSPORINE 0.5 MG/ML
1 EMULSION OPHTHALMIC 2 TIMES DAILY
Qty: 60 EACH | Refills: 11 | Status: SHIPPED | OUTPATIENT
Start: 2021-04-07 | End: 2021-11-03

## 2021-04-07 RX ORDER — FLUOROMETHOLONE 1 MG/ML
1 SUSPENSION/ DROPS OPHTHALMIC 2 TIMES DAILY
Qty: 5 ML | Refills: 0 | Status: SHIPPED | OUTPATIENT
Start: 2021-04-07 | End: 2021-04-07

## 2021-04-16 ENCOUNTER — OFFICE VISIT (OUTPATIENT)
Dept: OPHTHALMOLOGY | Facility: CLINIC | Age: 86
End: 2021-04-16
Payer: MEDICARE

## 2021-04-16 DIAGNOSIS — Z96.1 PSEUDOPHAKIA OF BOTH EYES: ICD-10-CM

## 2021-04-16 DIAGNOSIS — H40.1122 PRIMARY OPEN ANGLE GLAUCOMA (POAG) OF LEFT EYE, MODERATE STAGE: Primary | ICD-10-CM

## 2021-04-16 DIAGNOSIS — H04.123 DRY EYE SYNDROME, BILATERAL: ICD-10-CM

## 2021-04-16 PROCEDURE — 92083 HUMPHREY VISUAL FIELD - OU - BOTH EYES: ICD-10-PCS | Mod: S$GLB,,, | Performed by: OPTOMETRIST

## 2021-04-16 PROCEDURE — 99999 PR PBB SHADOW E&M-EST. PATIENT-LVL III: ICD-10-PCS | Mod: PBBFAC,,, | Performed by: OPTOMETRIST

## 2021-04-16 PROCEDURE — 92083 EXTENDED VISUAL FIELD XM: CPT | Mod: S$GLB,,, | Performed by: OPTOMETRIST

## 2021-04-16 PROCEDURE — 92014 COMPRE OPH EXAM EST PT 1/>: CPT | Mod: S$GLB,,, | Performed by: OPTOMETRIST

## 2021-04-16 PROCEDURE — 99999 PR PBB SHADOW E&M-EST. PATIENT-LVL III: CPT | Mod: PBBFAC,,, | Performed by: OPTOMETRIST

## 2021-04-16 PROCEDURE — 92014 PR EYE EXAM, EST PATIENT,COMPREHESV: ICD-10-PCS | Mod: S$GLB,,, | Performed by: OPTOMETRIST

## 2021-04-21 ENCOUNTER — LAB VISIT (OUTPATIENT)
Dept: LAB | Facility: HOSPITAL | Age: 86
End: 2021-04-21
Attending: FAMILY MEDICINE
Payer: MEDICARE

## 2021-04-21 ENCOUNTER — OFFICE VISIT (OUTPATIENT)
Dept: OPHTHALMOLOGY | Facility: CLINIC | Age: 86
End: 2021-04-21
Payer: MEDICARE

## 2021-04-21 DIAGNOSIS — H16.223 KERATOCONJUNCTIVITIS SICCA OF BOTH EYES NOT SPECIFIED AS SJOGREN'S: ICD-10-CM

## 2021-04-21 DIAGNOSIS — Z79.01 CHRONIC ANTICOAGULATION: ICD-10-CM

## 2021-04-21 DIAGNOSIS — H40.1122 PRIMARY OPEN ANGLE GLAUCOMA (POAG) OF LEFT EYE, MODERATE STAGE: Primary | ICD-10-CM

## 2021-04-21 DIAGNOSIS — I25.119 CORONARY ARTERY DISEASE INVOLVING NATIVE CORONARY ARTERY OF NATIVE HEART WITH ANGINA PECTORIS: ICD-10-CM

## 2021-04-21 DIAGNOSIS — I73.9 PERIPHERAL VASCULAR DISEASE: ICD-10-CM

## 2021-04-21 DIAGNOSIS — H04.123 DRY EYE SYNDROME, BILATERAL: ICD-10-CM

## 2021-04-21 DIAGNOSIS — I48.20 ATRIAL FIBRILLATION, CHRONIC: ICD-10-CM

## 2021-04-21 DIAGNOSIS — E53.8 B12 DEFICIENCY DUE TO DIET: ICD-10-CM

## 2021-04-21 DIAGNOSIS — M54.9 CHRONIC MIDLINE BACK PAIN, UNSPECIFIED BACK LOCATION: ICD-10-CM

## 2021-04-21 DIAGNOSIS — M48.061 SPINAL STENOSIS OF LUMBAR REGION AT MULTIPLE LEVELS: ICD-10-CM

## 2021-04-21 DIAGNOSIS — G89.29 CHRONIC MIDLINE BACK PAIN, UNSPECIFIED BACK LOCATION: ICD-10-CM

## 2021-04-21 DIAGNOSIS — M19.90 OSTEOARTHRITIS, UNSPECIFIED OSTEOARTHRITIS TYPE, UNSPECIFIED SITE: ICD-10-CM

## 2021-04-21 DIAGNOSIS — N32.81 OAB (OVERACTIVE BLADDER): ICD-10-CM

## 2021-04-21 LAB
BASOPHILS # BLD AUTO: 0.06 K/UL (ref 0–0.2)
BASOPHILS NFR BLD: 1 % (ref 0–1.9)
DIFFERENTIAL METHOD: ABNORMAL
EOSINOPHIL # BLD AUTO: 0.3 K/UL (ref 0–0.5)
EOSINOPHIL NFR BLD: 5.2 % (ref 0–8)
ERYTHROCYTE [DISTWIDTH] IN BLOOD BY AUTOMATED COUNT: 14.9 % (ref 11.5–14.5)
HCT VFR BLD AUTO: 40.1 % (ref 40–54)
HGB BLD-MCNC: 12.8 G/DL (ref 14–18)
IMM GRANULOCYTES # BLD AUTO: 0.02 K/UL (ref 0–0.04)
IMM GRANULOCYTES NFR BLD AUTO: 0.3 % (ref 0–0.5)
LYMPHOCYTES # BLD AUTO: 1.6 K/UL (ref 1–4.8)
LYMPHOCYTES NFR BLD: 27.1 % (ref 18–48)
MCH RBC QN AUTO: 30.7 PG (ref 27–31)
MCHC RBC AUTO-ENTMCNC: 31.9 G/DL (ref 32–36)
MCV RBC AUTO: 96 FL (ref 82–98)
MONOCYTES # BLD AUTO: 0.5 K/UL (ref 0.3–1)
MONOCYTES NFR BLD: 9 % (ref 4–15)
NEUTROPHILS # BLD AUTO: 3.4 K/UL (ref 1.8–7.7)
NEUTROPHILS NFR BLD: 57.4 % (ref 38–73)
NRBC BLD-RTO: 0 /100 WBC
PLATELET # BLD AUTO: 163 K/UL (ref 150–450)
PMV BLD AUTO: 11.3 FL (ref 9.2–12.9)
RBC # BLD AUTO: 4.17 M/UL (ref 4.6–6.2)
WBC # BLD AUTO: 5.98 K/UL (ref 3.9–12.7)

## 2021-04-21 PROCEDURE — 80061 LIPID PANEL: CPT | Performed by: FAMILY MEDICINE

## 2021-04-21 PROCEDURE — 1159F MED LIST DOCD IN RCRD: CPT | Mod: S$GLB,,, | Performed by: OPTOMETRIST

## 2021-04-21 PROCEDURE — 82607 VITAMIN B-12: CPT | Performed by: FAMILY MEDICINE

## 2021-04-21 PROCEDURE — 99499 UNLISTED E&M SERVICE: CPT | Mod: S$GLB,,, | Performed by: OPTOMETRIST

## 2021-04-21 PROCEDURE — 82728 ASSAY OF FERRITIN: CPT | Performed by: FAMILY MEDICINE

## 2021-04-21 PROCEDURE — 99213 PR OFFICE/OUTPT VISIT, EST, LEVL III, 20-29 MIN: ICD-10-PCS | Mod: S$GLB,,, | Performed by: OPTOMETRIST

## 2021-04-21 PROCEDURE — 85025 COMPLETE CBC W/AUTO DIFF WBC: CPT | Performed by: FAMILY MEDICINE

## 2021-04-21 PROCEDURE — 99213 OFFICE O/P EST LOW 20 MIN: CPT | Mod: S$GLB,,, | Performed by: OPTOMETRIST

## 2021-04-21 PROCEDURE — 99499 RISK ADDL DX/OHS AUDIT: ICD-10-PCS | Mod: S$GLB,,, | Performed by: OPTOMETRIST

## 2021-04-21 PROCEDURE — 83540 ASSAY OF IRON: CPT | Performed by: FAMILY MEDICINE

## 2021-04-21 PROCEDURE — 36415 COLL VENOUS BLD VENIPUNCTURE: CPT | Mod: PO | Performed by: FAMILY MEDICINE

## 2021-04-21 PROCEDURE — 80053 COMPREHEN METABOLIC PANEL: CPT | Performed by: FAMILY MEDICINE

## 2021-04-21 PROCEDURE — 84443 ASSAY THYROID STIM HORMONE: CPT | Performed by: FAMILY MEDICINE

## 2021-04-21 PROCEDURE — 1159F PR MEDICATION LIST DOCUMENTED IN MEDICAL RECORD: ICD-10-PCS | Mod: S$GLB,,, | Performed by: OPTOMETRIST

## 2021-04-21 PROCEDURE — 99999 PR PBB SHADOW E&M-EST. PATIENT-LVL III: ICD-10-PCS | Mod: PBBFAC,,, | Performed by: OPTOMETRIST

## 2021-04-21 PROCEDURE — 84439 ASSAY OF FREE THYROXINE: CPT | Performed by: FAMILY MEDICINE

## 2021-04-21 PROCEDURE — 99999 PR PBB SHADOW E&M-EST. PATIENT-LVL III: CPT | Mod: PBBFAC,,, | Performed by: OPTOMETRIST

## 2021-04-22 LAB
ALBUMIN SERPL BCP-MCNC: 3.5 G/DL (ref 3.5–5.2)
ALP SERPL-CCNC: 66 U/L (ref 55–135)
ALT SERPL W/O P-5'-P-CCNC: 9 U/L (ref 10–44)
ANION GAP SERPL CALC-SCNC: 6 MMOL/L (ref 8–16)
AST SERPL-CCNC: 17 U/L (ref 10–40)
BILIRUB SERPL-MCNC: 1.4 MG/DL (ref 0.1–1)
BUN SERPL-MCNC: 9 MG/DL (ref 10–30)
CALCIUM SERPL-MCNC: 8.2 MG/DL (ref 8.7–10.5)
CHLORIDE SERPL-SCNC: 108 MMOL/L (ref 95–110)
CHOLEST SERPL-MCNC: 150 MG/DL (ref 120–199)
CHOLEST/HDLC SERPL: 2.8 {RATIO} (ref 2–5)
CO2 SERPL-SCNC: 26 MMOL/L (ref 23–29)
CREAT SERPL-MCNC: 0.9 MG/DL (ref 0.5–1.4)
EST. GFR  (AFRICAN AMERICAN): >60 ML/MIN/1.73 M^2
EST. GFR  (NON AFRICAN AMERICAN): >60 ML/MIN/1.73 M^2
FERRITIN SERPL-MCNC: 81 NG/ML (ref 20–300)
GLUCOSE SERPL-MCNC: 81 MG/DL (ref 70–110)
HDLC SERPL-MCNC: 54 MG/DL (ref 40–75)
HDLC SERPL: 36 % (ref 20–50)
IRON SERPL-MCNC: 75 UG/DL (ref 45–160)
LDLC SERPL CALC-MCNC: 83.4 MG/DL (ref 63–159)
NONHDLC SERPL-MCNC: 96 MG/DL
POTASSIUM SERPL-SCNC: 4.4 MMOL/L (ref 3.5–5.1)
PROT SERPL-MCNC: 6.3 G/DL (ref 6–8.4)
SATURATED IRON: 22 % (ref 20–50)
SODIUM SERPL-SCNC: 140 MMOL/L (ref 136–145)
T4 FREE SERPL-MCNC: 0.99 NG/DL (ref 0.71–1.51)
TOTAL IRON BINDING CAPACITY: 336 UG/DL (ref 250–450)
TRANSFERRIN SERPL-MCNC: 227 MG/DL (ref 200–375)
TRIGL SERPL-MCNC: 63 MG/DL (ref 30–150)
TSH SERPL DL<=0.005 MIU/L-ACNC: 0.65 UIU/ML (ref 0.4–4)
VIT B12 SERPL-MCNC: 571 PG/ML (ref 210–950)

## 2021-05-03 ENCOUNTER — OFFICE VISIT (OUTPATIENT)
Dept: PRIMARY CARE CLINIC | Facility: CLINIC | Age: 86
End: 2021-05-03
Payer: MEDICARE

## 2021-05-03 ENCOUNTER — LAB VISIT (OUTPATIENT)
Dept: LAB | Facility: HOSPITAL | Age: 86
End: 2021-05-03
Attending: FAMILY MEDICINE
Payer: MEDICARE

## 2021-05-03 VITALS
HEART RATE: 64 BPM | HEIGHT: 72 IN | BODY MASS INDEX: 24.11 KG/M2 | WEIGHT: 178 LBS | SYSTOLIC BLOOD PRESSURE: 122 MMHG | DIASTOLIC BLOOD PRESSURE: 78 MMHG

## 2021-05-03 DIAGNOSIS — G89.29 CHRONIC MIDLINE BACK PAIN, UNSPECIFIED BACK LOCATION: ICD-10-CM

## 2021-05-03 DIAGNOSIS — R39.12 BENIGN PROSTATIC HYPERPLASIA WITH WEAK URINARY STREAM: ICD-10-CM

## 2021-05-03 DIAGNOSIS — Z00.00 ROUTINE GENERAL MEDICAL EXAMINATION AT A HEALTH CARE FACILITY: ICD-10-CM

## 2021-05-03 DIAGNOSIS — F32.A DEPRESSION, UNSPECIFIED DEPRESSION TYPE: ICD-10-CM

## 2021-05-03 DIAGNOSIS — Z79.01 ANTICOAGULATED ON COUMADIN: ICD-10-CM

## 2021-05-03 DIAGNOSIS — I48.20 ATRIAL FIBRILLATION, CHRONIC: ICD-10-CM

## 2021-05-03 DIAGNOSIS — M48.061 SPINAL STENOSIS OF LUMBAR REGION AT MULTIPLE LEVELS: Primary | ICD-10-CM

## 2021-05-03 DIAGNOSIS — N40.1 BENIGN PROSTATIC HYPERPLASIA WITH WEAK URINARY STREAM: ICD-10-CM

## 2021-05-03 DIAGNOSIS — E53.8 B12 DEFICIENCY DUE TO DIET: ICD-10-CM

## 2021-05-03 DIAGNOSIS — M81.0 OSTEOPOROSIS, UNSPECIFIED OSTEOPOROSIS TYPE, UNSPECIFIED PATHOLOGICAL FRACTURE PRESENCE: ICD-10-CM

## 2021-05-03 DIAGNOSIS — Z95.2 HX OF PROSTHETIC MITRAL VALVE: ICD-10-CM

## 2021-05-03 DIAGNOSIS — R05.9 COUGH: ICD-10-CM

## 2021-05-03 DIAGNOSIS — M46.1 SACROILIITIS: ICD-10-CM

## 2021-05-03 DIAGNOSIS — R79.89 LOW SERUM CALCIUM: ICD-10-CM

## 2021-05-03 DIAGNOSIS — D69.6 THROMBOCYTOPENIA, UNSPECIFIED: ICD-10-CM

## 2021-05-03 DIAGNOSIS — I73.9 PERIPHERAL VASCULAR DISEASE: ICD-10-CM

## 2021-05-03 DIAGNOSIS — I25.119 CORONARY ARTERY DISEASE INVOLVING NATIVE CORONARY ARTERY OF NATIVE HEART WITH ANGINA PECTORIS: ICD-10-CM

## 2021-05-03 DIAGNOSIS — T17.308A CHOKING, INITIAL ENCOUNTER: ICD-10-CM

## 2021-05-03 DIAGNOSIS — M62.81 PROXIMAL MUSCLE WEAKNESS: ICD-10-CM

## 2021-05-03 DIAGNOSIS — M54.9 CHRONIC MIDLINE BACK PAIN, UNSPECIFIED BACK LOCATION: ICD-10-CM

## 2021-05-03 DIAGNOSIS — M51.37 DDD (DEGENERATIVE DISC DISEASE), LUMBOSACRAL: ICD-10-CM

## 2021-05-03 PROCEDURE — 99999 PR PBB SHADOW E&M-EST. PATIENT-LVL IV: ICD-10-PCS | Mod: PBBFAC,,, | Performed by: FAMILY MEDICINE

## 2021-05-03 PROCEDURE — 82306 VITAMIN D 25 HYDROXY: CPT | Performed by: FAMILY MEDICINE

## 2021-05-03 PROCEDURE — 83970 ASSAY OF PARATHORMONE: CPT | Performed by: FAMILY MEDICINE

## 2021-05-03 PROCEDURE — 99215 OFFICE O/P EST HI 40 MIN: CPT | Mod: S$GLB,,, | Performed by: FAMILY MEDICINE

## 2021-05-03 PROCEDURE — 36415 COLL VENOUS BLD VENIPUNCTURE: CPT | Mod: PN | Performed by: FAMILY MEDICINE

## 2021-05-03 PROCEDURE — 1126F AMNT PAIN NOTED NONE PRSNT: CPT | Mod: S$GLB,,, | Performed by: FAMILY MEDICINE

## 2021-05-03 PROCEDURE — 99499 RISK ADDL DX/OHS AUDIT: ICD-10-PCS | Mod: S$GLB,,, | Performed by: FAMILY MEDICINE

## 2021-05-03 PROCEDURE — 3288F FALL RISK ASSESSMENT DOCD: CPT | Mod: CPTII,S$GLB,, | Performed by: FAMILY MEDICINE

## 2021-05-03 PROCEDURE — 1126F PR PAIN SEVERITY QUANTIFIED, NO PAIN PRESENT: ICD-10-PCS | Mod: S$GLB,,, | Performed by: FAMILY MEDICINE

## 2021-05-03 PROCEDURE — 3288F PR FALLS RISK ASSESSMENT DOCUMENTED: ICD-10-PCS | Mod: CPTII,S$GLB,, | Performed by: FAMILY MEDICINE

## 2021-05-03 PROCEDURE — 82330 ASSAY OF CALCIUM: CPT | Performed by: FAMILY MEDICINE

## 2021-05-03 PROCEDURE — 1159F PR MEDICATION LIST DOCUMENTED IN MEDICAL RECORD: ICD-10-PCS | Mod: S$GLB,,, | Performed by: FAMILY MEDICINE

## 2021-05-03 PROCEDURE — 99499 UNLISTED E&M SERVICE: CPT | Mod: S$GLB,,, | Performed by: FAMILY MEDICINE

## 2021-05-03 PROCEDURE — 1101F PT FALLS ASSESS-DOCD LE1/YR: CPT | Mod: CPTII,S$GLB,, | Performed by: FAMILY MEDICINE

## 2021-05-03 PROCEDURE — 99999 PR PBB SHADOW E&M-EST. PATIENT-LVL IV: CPT | Mod: PBBFAC,,, | Performed by: FAMILY MEDICINE

## 2021-05-03 PROCEDURE — 99215 PR OFFICE/OUTPT VISIT, EST, LEVL V, 40-54 MIN: ICD-10-PCS | Mod: S$GLB,,, | Performed by: FAMILY MEDICINE

## 2021-05-03 PROCEDURE — 1159F MED LIST DOCD IN RCRD: CPT | Mod: S$GLB,,, | Performed by: FAMILY MEDICINE

## 2021-05-03 PROCEDURE — 1101F PR PT FALLS ASSESS DOC 0-1 FALLS W/OUT INJ PAST YR: ICD-10-PCS | Mod: CPTII,S$GLB,, | Performed by: FAMILY MEDICINE

## 2021-05-03 RX ORDER — GABAPENTIN 100 MG/1
CAPSULE ORAL
Qty: 630 CAPSULE | Refills: 3 | Status: SHIPPED | OUTPATIENT
Start: 2021-05-03 | End: 2022-02-08

## 2021-05-04 LAB
25(OH)D3+25(OH)D2 SERPL-MCNC: 27 NG/ML (ref 30–96)
CA-I BLDV-SCNC: 1.26 MMOL/L (ref 1.06–1.42)
PTH-INTACT SERPL-MCNC: 52 PG/ML (ref 9–77)

## 2021-05-06 RX ORDER — LATANOPROST 50 UG/ML
1 SOLUTION/ DROPS OPHTHALMIC NIGHTLY
Qty: 2.5 ML | Refills: 4 | Status: SHIPPED | OUTPATIENT
Start: 2021-05-06 | End: 2021-07-23 | Stop reason: SDUPTHER

## 2021-05-21 ENCOUNTER — OFFICE VISIT (OUTPATIENT)
Dept: OPHTHALMOLOGY | Facility: CLINIC | Age: 86
End: 2021-05-21
Payer: MEDICARE

## 2021-05-21 DIAGNOSIS — H16.223 KERATOCONJUNCTIVITIS SICCA OF BOTH EYES NOT SPECIFIED AS SJOGREN'S: Primary | ICD-10-CM

## 2021-05-21 DIAGNOSIS — H40.1122 PRIMARY OPEN ANGLE GLAUCOMA (POAG) OF LEFT EYE, MODERATE STAGE: ICD-10-CM

## 2021-05-21 PROCEDURE — 1159F MED LIST DOCD IN RCRD: CPT | Mod: S$GLB,,, | Performed by: OPTOMETRIST

## 2021-05-21 PROCEDURE — 1159F PR MEDICATION LIST DOCUMENTED IN MEDICAL RECORD: ICD-10-PCS | Mod: S$GLB,,, | Performed by: OPTOMETRIST

## 2021-05-21 PROCEDURE — 99999 PR PBB SHADOW E&M-EST. PATIENT-LVL III: CPT | Mod: PBBFAC,,, | Performed by: OPTOMETRIST

## 2021-05-21 PROCEDURE — 99213 OFFICE O/P EST LOW 20 MIN: CPT | Mod: S$GLB,,, | Performed by: OPTOMETRIST

## 2021-05-21 PROCEDURE — 99213 PR OFFICE/OUTPT VISIT, EST, LEVL III, 20-29 MIN: ICD-10-PCS | Mod: S$GLB,,, | Performed by: OPTOMETRIST

## 2021-05-21 PROCEDURE — 99999 PR PBB SHADOW E&M-EST. PATIENT-LVL III: ICD-10-PCS | Mod: PBBFAC,,, | Performed by: OPTOMETRIST

## 2021-07-23 ENCOUNTER — OFFICE VISIT (OUTPATIENT)
Dept: OPHTHALMOLOGY | Facility: CLINIC | Age: 86
End: 2021-07-23
Payer: MEDICARE

## 2021-07-23 DIAGNOSIS — H40.1122 PRIMARY OPEN ANGLE GLAUCOMA (POAG) OF LEFT EYE, MODERATE STAGE: Primary | ICD-10-CM

## 2021-07-23 DIAGNOSIS — H16.223 KERATOCONJUNCTIVITIS SICCA OF BOTH EYES NOT SPECIFIED AS SJOGREN'S: ICD-10-CM

## 2021-07-23 PROCEDURE — 99213 OFFICE O/P EST LOW 20 MIN: CPT | Mod: S$GLB,,, | Performed by: OPTOMETRIST

## 2021-07-23 PROCEDURE — 92133 OCT, OPTIC NERVE - OU - BOTH EYES: ICD-10-PCS | Mod: S$GLB,,, | Performed by: OPTOMETRIST

## 2021-07-23 PROCEDURE — 92133 CPTRZD OPH DX IMG PST SGM ON: CPT | Mod: S$GLB,,, | Performed by: OPTOMETRIST

## 2021-07-23 PROCEDURE — 99999 PR PBB SHADOW E&M-EST. PATIENT-LVL III: ICD-10-PCS | Mod: PBBFAC,,, | Performed by: OPTOMETRIST

## 2021-07-23 PROCEDURE — 99499 RISK ADDL DX/OHS AUDIT: ICD-10-PCS | Mod: S$GLB,,, | Performed by: OPTOMETRIST

## 2021-07-23 PROCEDURE — 1159F PR MEDICATION LIST DOCUMENTED IN MEDICAL RECORD: ICD-10-PCS | Mod: CPTII,S$GLB,, | Performed by: OPTOMETRIST

## 2021-07-23 PROCEDURE — 1159F MED LIST DOCD IN RCRD: CPT | Mod: CPTII,S$GLB,, | Performed by: OPTOMETRIST

## 2021-07-23 PROCEDURE — 99999 PR PBB SHADOW E&M-EST. PATIENT-LVL III: CPT | Mod: PBBFAC,,, | Performed by: OPTOMETRIST

## 2021-07-23 PROCEDURE — 99499 UNLISTED E&M SERVICE: CPT | Mod: S$GLB,,, | Performed by: OPTOMETRIST

## 2021-07-23 PROCEDURE — 99213 PR OFFICE/OUTPT VISIT, EST, LEVL III, 20-29 MIN: ICD-10-PCS | Mod: S$GLB,,, | Performed by: OPTOMETRIST

## 2021-07-23 RX ORDER — LATANOPROST 50 UG/ML
1 SOLUTION/ DROPS OPHTHALMIC NIGHTLY
Qty: 2.5 ML | Refills: 4 | Status: SHIPPED | OUTPATIENT
Start: 2021-07-23 | End: 2022-01-07 | Stop reason: SDUPTHER

## 2021-07-26 ENCOUNTER — TELEPHONE (OUTPATIENT)
Dept: ADMINISTRATIVE | Facility: HOSPITAL | Age: 86
End: 2021-07-26

## 2021-07-26 ENCOUNTER — NURSE TRIAGE (OUTPATIENT)
Dept: ADMINISTRATIVE | Facility: CLINIC | Age: 86
End: 2021-07-26

## 2021-07-27 ENCOUNTER — OFFICE VISIT (OUTPATIENT)
Dept: PRIMARY CARE CLINIC | Facility: CLINIC | Age: 86
End: 2021-07-27
Payer: MEDICARE

## 2021-07-27 VITALS
DIASTOLIC BLOOD PRESSURE: 84 MMHG | TEMPERATURE: 98 F | WEIGHT: 179.44 LBS | HEIGHT: 72 IN | SYSTOLIC BLOOD PRESSURE: 142 MMHG | BODY MASS INDEX: 24.3 KG/M2 | HEART RATE: 74 BPM

## 2021-07-27 DIAGNOSIS — R06.02 SHORTNESS OF BREATH: ICD-10-CM

## 2021-07-27 DIAGNOSIS — M79.671 RIGHT FOOT PAIN: ICD-10-CM

## 2021-07-27 DIAGNOSIS — R53.83 FATIGUE, UNSPECIFIED TYPE: Primary | ICD-10-CM

## 2021-07-27 PROCEDURE — 1101F PR PT FALLS ASSESS DOC 0-1 FALLS W/OUT INJ PAST YR: ICD-10-PCS | Mod: CPTII,S$GLB,, | Performed by: NURSE PRACTITIONER

## 2021-07-27 PROCEDURE — 99214 OFFICE O/P EST MOD 30 MIN: CPT | Mod: S$GLB,,, | Performed by: NURSE PRACTITIONER

## 2021-07-27 PROCEDURE — 1159F MED LIST DOCD IN RCRD: CPT | Mod: CPTII,S$GLB,, | Performed by: NURSE PRACTITIONER

## 2021-07-27 PROCEDURE — 1159F PR MEDICATION LIST DOCUMENTED IN MEDICAL RECORD: ICD-10-PCS | Mod: CPTII,S$GLB,, | Performed by: NURSE PRACTITIONER

## 2021-07-27 PROCEDURE — 3288F PR FALLS RISK ASSESSMENT DOCUMENTED: ICD-10-PCS | Mod: CPTII,S$GLB,, | Performed by: NURSE PRACTITIONER

## 2021-07-27 PROCEDURE — 99999 PR PBB SHADOW E&M-EST. PATIENT-LVL V: ICD-10-PCS | Mod: PBBFAC,,, | Performed by: NURSE PRACTITIONER

## 2021-07-27 PROCEDURE — 1160F PR REVIEW ALL MEDS BY PRESCRIBER/CLIN PHARMACIST DOCUMENTED: ICD-10-PCS | Mod: CPTII,S$GLB,, | Performed by: NURSE PRACTITIONER

## 2021-07-27 PROCEDURE — 1101F PT FALLS ASSESS-DOCD LE1/YR: CPT | Mod: CPTII,S$GLB,, | Performed by: NURSE PRACTITIONER

## 2021-07-27 PROCEDURE — 1160F RVW MEDS BY RX/DR IN RCRD: CPT | Mod: CPTII,S$GLB,, | Performed by: NURSE PRACTITIONER

## 2021-07-27 PROCEDURE — 3288F FALL RISK ASSESSMENT DOCD: CPT | Mod: CPTII,S$GLB,, | Performed by: NURSE PRACTITIONER

## 2021-07-27 PROCEDURE — 1126F PR PAIN SEVERITY QUANTIFIED, NO PAIN PRESENT: ICD-10-PCS | Mod: CPTII,S$GLB,, | Performed by: NURSE PRACTITIONER

## 2021-07-27 PROCEDURE — 99214 PR OFFICE/OUTPT VISIT, EST, LEVL IV, 30-39 MIN: ICD-10-PCS | Mod: S$GLB,,, | Performed by: NURSE PRACTITIONER

## 2021-07-27 PROCEDURE — 99999 PR PBB SHADOW E&M-EST. PATIENT-LVL V: CPT | Mod: PBBFAC,,, | Performed by: NURSE PRACTITIONER

## 2021-07-27 PROCEDURE — 1126F AMNT PAIN NOTED NONE PRSNT: CPT | Mod: CPTII,S$GLB,, | Performed by: NURSE PRACTITIONER

## 2021-07-28 ENCOUNTER — HOSPITAL ENCOUNTER (OUTPATIENT)
Dept: RADIOLOGY | Facility: HOSPITAL | Age: 86
Discharge: HOME | End: 2021-07-28
Attending: NURSE PRACTITIONER
Payer: MEDICARE

## 2021-07-28 DIAGNOSIS — R53.83 FATIGUE, UNSPECIFIED TYPE: ICD-10-CM

## 2021-07-28 DIAGNOSIS — R06.02 SHORTNESS OF BREATH: ICD-10-CM

## 2021-07-28 PROCEDURE — 71046 X-RAY EXAM CHEST 2 VIEWS: CPT | Mod: 26,,, | Performed by: RADIOLOGY

## 2021-07-28 PROCEDURE — 71046 X-RAY EXAM CHEST 2 VIEWS: CPT | Mod: TC,FY,PO

## 2021-07-28 PROCEDURE — 71046 XR CHEST PA AND LATERAL: ICD-10-PCS | Mod: 26,,, | Performed by: RADIOLOGY

## 2021-09-09 ENCOUNTER — TELEPHONE (OUTPATIENT)
Dept: PRIMARY CARE CLINIC | Facility: CLINIC | Age: 86
End: 2021-09-09

## 2021-09-09 DIAGNOSIS — G89.29 CHRONIC PAIN OF LEFT WRIST: Primary | ICD-10-CM

## 2021-09-09 DIAGNOSIS — M62.81 PROXIMAL MUSCLE WEAKNESS: ICD-10-CM

## 2021-09-09 DIAGNOSIS — M25.532 CHRONIC PAIN OF LEFT WRIST: Primary | ICD-10-CM

## 2021-09-09 DIAGNOSIS — M48.061 SPINAL STENOSIS OF LUMBAR REGION AT MULTIPLE LEVELS: ICD-10-CM

## 2021-09-09 DIAGNOSIS — R26.9 ABNORMALITY OF GAIT AND MOBILITY: ICD-10-CM

## 2021-10-01 ENCOUNTER — IMMUNIZATION (OUTPATIENT)
Dept: INTERNAL MEDICINE | Facility: CLINIC | Age: 86
End: 2021-10-01
Payer: MEDICARE

## 2021-10-01 PROCEDURE — G0008 ADMIN INFLUENZA VIRUS VAC: HCPCS | Mod: S$GLB,,, | Performed by: INTERNAL MEDICINE

## 2021-10-01 PROCEDURE — 90694 VACC AIIV4 NO PRSRV 0.5ML IM: CPT | Mod: S$GLB,,, | Performed by: INTERNAL MEDICINE

## 2021-10-01 PROCEDURE — G0008 FLU VACCINE - QUADRIVALENT - ADJUVANTED: ICD-10-PCS | Mod: S$GLB,,, | Performed by: INTERNAL MEDICINE

## 2021-10-01 PROCEDURE — 90694 FLU VACCINE - QUADRIVALENT - ADJUVANTED: ICD-10-PCS | Mod: S$GLB,,, | Performed by: INTERNAL MEDICINE

## 2021-10-06 ENCOUNTER — OFFICE VISIT (OUTPATIENT)
Dept: OPHTHALMOLOGY | Facility: CLINIC | Age: 86
End: 2021-10-06
Payer: MEDICARE

## 2021-10-06 DIAGNOSIS — H02.883 MEIBOMIAN GLAND DYSFUNCTION (MGD) OF BOTH EYES: ICD-10-CM

## 2021-10-06 DIAGNOSIS — H40.1122 PRIMARY OPEN ANGLE GLAUCOMA (POAG) OF LEFT EYE, MODERATE STAGE: Primary | ICD-10-CM

## 2021-10-06 DIAGNOSIS — H02.886 MEIBOMIAN GLAND DYSFUNCTION (MGD) OF BOTH EYES: ICD-10-CM

## 2021-10-06 DIAGNOSIS — H16.211 EXPOSURE KERATOPATHY, RIGHT: ICD-10-CM

## 2021-10-06 PROCEDURE — 99999 PR PBB SHADOW E&M-EST. PATIENT-LVL III: ICD-10-PCS | Mod: PBBFAC,,, | Performed by: OPTOMETRIST

## 2021-10-06 PROCEDURE — 99499 RISK ADDL DX/OHS AUDIT: ICD-10-PCS | Mod: S$GLB,,, | Performed by: OPTOMETRIST

## 2021-10-06 PROCEDURE — 99213 OFFICE O/P EST LOW 20 MIN: CPT | Mod: S$GLB,,, | Performed by: OPTOMETRIST

## 2021-10-06 PROCEDURE — 99499 UNLISTED E&M SERVICE: CPT | Mod: S$GLB,,, | Performed by: OPTOMETRIST

## 2021-10-06 PROCEDURE — 1159F MED LIST DOCD IN RCRD: CPT | Mod: CPTII,S$GLB,, | Performed by: OPTOMETRIST

## 2021-10-06 PROCEDURE — 2023F PR DILATED RETINAL EXAM W/O EVID OF RETINOPATHY: ICD-10-PCS | Mod: CPTII,S$GLB,, | Performed by: OPTOMETRIST

## 2021-10-06 PROCEDURE — 92083 HUMPHREY VISUAL FIELD - OU - BOTH EYES: ICD-10-PCS | Mod: S$GLB,,, | Performed by: OPTOMETRIST

## 2021-10-06 PROCEDURE — 2023F DILAT RTA XM W/O RTNOPTHY: CPT | Mod: CPTII,S$GLB,, | Performed by: OPTOMETRIST

## 2021-10-06 PROCEDURE — 1159F PR MEDICATION LIST DOCUMENTED IN MEDICAL RECORD: ICD-10-PCS | Mod: CPTII,S$GLB,, | Performed by: OPTOMETRIST

## 2021-10-06 PROCEDURE — 92083 EXTENDED VISUAL FIELD XM: CPT | Mod: S$GLB,,, | Performed by: OPTOMETRIST

## 2021-10-06 PROCEDURE — 99999 PR PBB SHADOW E&M-EST. PATIENT-LVL III: CPT | Mod: PBBFAC,,, | Performed by: OPTOMETRIST

## 2021-10-06 PROCEDURE — 99213 PR OFFICE/OUTPT VISIT, EST, LEVL III, 20-29 MIN: ICD-10-PCS | Mod: S$GLB,,, | Performed by: OPTOMETRIST

## 2021-10-06 RX ORDER — LOTEPREDNOL ETABONATE 5 MG/ML
1 SUSPENSION/ DROPS OPHTHALMIC 3 TIMES DAILY
Qty: 5 ML | Refills: 1 | Status: SHIPPED | OUTPATIENT
Start: 2021-10-06 | End: 2021-10-06 | Stop reason: CLARIF

## 2021-10-18 ENCOUNTER — TELEPHONE (OUTPATIENT)
Dept: ADMINISTRATIVE | Facility: HOSPITAL | Age: 86
End: 2021-10-18

## 2021-10-19 ENCOUNTER — OFFICE VISIT (OUTPATIENT)
Dept: OPHTHALMOLOGY | Facility: CLINIC | Age: 86
End: 2021-10-19
Payer: MEDICARE

## 2021-10-19 DIAGNOSIS — H02.886 MEIBOMIAN GLAND DYSFUNCTION (MGD) OF BOTH EYES: Primary | ICD-10-CM

## 2021-10-19 DIAGNOSIS — H16.223 KERATOCONJUNCTIVITIS SICCA OF BOTH EYES NOT SPECIFIED AS SJOGREN'S: ICD-10-CM

## 2021-10-19 DIAGNOSIS — H16.211 EXPOSURE KERATOPATHY, RIGHT: ICD-10-CM

## 2021-10-19 DIAGNOSIS — H40.1122 PRIMARY OPEN ANGLE GLAUCOMA (POAG) OF LEFT EYE, MODERATE STAGE: ICD-10-CM

## 2021-10-19 DIAGNOSIS — H02.883 MEIBOMIAN GLAND DYSFUNCTION (MGD) OF BOTH EYES: Primary | ICD-10-CM

## 2021-10-19 PROCEDURE — 99213 OFFICE O/P EST LOW 20 MIN: CPT | Mod: S$GLB,,, | Performed by: OPTOMETRIST

## 2021-10-19 PROCEDURE — 99999 PR PBB SHADOW E&M-EST. PATIENT-LVL III: CPT | Mod: PBBFAC,,, | Performed by: OPTOMETRIST

## 2021-10-19 PROCEDURE — 1159F MED LIST DOCD IN RCRD: CPT | Mod: CPTII,S$GLB,, | Performed by: OPTOMETRIST

## 2021-10-19 PROCEDURE — 99213 PR OFFICE/OUTPT VISIT, EST, LEVL III, 20-29 MIN: ICD-10-PCS | Mod: S$GLB,,, | Performed by: OPTOMETRIST

## 2021-10-19 PROCEDURE — 1159F PR MEDICATION LIST DOCUMENTED IN MEDICAL RECORD: ICD-10-PCS | Mod: CPTII,S$GLB,, | Performed by: OPTOMETRIST

## 2021-10-19 PROCEDURE — 99999 PR PBB SHADOW E&M-EST. PATIENT-LVL III: ICD-10-PCS | Mod: PBBFAC,,, | Performed by: OPTOMETRIST

## 2021-10-19 RX ORDER — NEOMYCIN SULFATE, POLYMYXIN B SULFATE AND DEXAMETHASONE 3.5; 10000; 1 MG/ML; [USP'U]/ML; MG/ML
1 SUSPENSION/ DROPS OPHTHALMIC 3 TIMES DAILY
Qty: 5 ML | Refills: 0 | Status: SHIPPED | OUTPATIENT
Start: 2021-10-19 | End: 2021-10-29

## 2021-10-27 ENCOUNTER — HOSPITAL ENCOUNTER (OUTPATIENT)
Dept: RADIOLOGY | Facility: HOSPITAL | Age: 86
Discharge: HOME | End: 2021-10-27
Attending: FAMILY MEDICINE
Payer: MEDICARE

## 2021-10-27 ENCOUNTER — OFFICE VISIT (OUTPATIENT)
Dept: OPHTHALMOLOGY | Facility: CLINIC | Age: 86
End: 2021-10-27
Payer: MEDICARE

## 2021-10-27 DIAGNOSIS — H16.211 EXPOSURE KERATOCONJUNCTIVITIS OF RIGHT EYE: ICD-10-CM

## 2021-10-27 DIAGNOSIS — G89.29 CHRONIC PAIN OF LEFT WRIST: ICD-10-CM

## 2021-10-27 DIAGNOSIS — H40.1122 PRIMARY OPEN ANGLE GLAUCOMA (POAG) OF LEFT EYE, MODERATE STAGE: ICD-10-CM

## 2021-10-27 DIAGNOSIS — M25.532 CHRONIC PAIN OF LEFT WRIST: ICD-10-CM

## 2021-10-27 DIAGNOSIS — H02.886 MEIBOMIAN GLAND DYSFUNCTION (MGD) OF BOTH EYES: ICD-10-CM

## 2021-10-27 DIAGNOSIS — H02.883 MEIBOMIAN GLAND DYSFUNCTION (MGD) OF BOTH EYES: ICD-10-CM

## 2021-10-27 DIAGNOSIS — H16.223 KERATOCONJUNCTIVITIS SICCA OF BOTH EYES NOT SPECIFIED AS SJOGREN'S: Primary | ICD-10-CM

## 2021-10-27 PROCEDURE — 1159F MED LIST DOCD IN RCRD: CPT | Mod: CPTII,S$GLB,, | Performed by: OPTOMETRIST

## 2021-10-27 PROCEDURE — 99214 PR OFFICE/OUTPT VISIT, EST, LEVL IV, 30-39 MIN: ICD-10-PCS | Mod: S$GLB,,, | Performed by: OPTOMETRIST

## 2021-10-27 PROCEDURE — 73100 X-RAY EXAM OF WRIST: CPT | Mod: TC,FY,PO,LT

## 2021-10-27 PROCEDURE — 1159F PR MEDICATION LIST DOCUMENTED IN MEDICAL RECORD: ICD-10-PCS | Mod: CPTII,S$GLB,, | Performed by: OPTOMETRIST

## 2021-10-27 PROCEDURE — 99999 PR PBB SHADOW E&M-EST. PATIENT-LVL III: CPT | Mod: PBBFAC,,, | Performed by: OPTOMETRIST

## 2021-10-27 PROCEDURE — 99214 OFFICE O/P EST MOD 30 MIN: CPT | Mod: S$GLB,,, | Performed by: OPTOMETRIST

## 2021-10-27 PROCEDURE — 73100 XR WRIST 2 VIEW LEFT: ICD-10-PCS | Mod: 26,LT,, | Performed by: RADIOLOGY

## 2021-10-27 PROCEDURE — 92071 PR CONTACT LENS FITTING FOR TX: ICD-10-PCS | Mod: RT,S$GLB,, | Performed by: OPTOMETRIST

## 2021-10-27 PROCEDURE — 92071 CONTACT LENS FITTING FOR TX: CPT | Mod: RT,S$GLB,, | Performed by: OPTOMETRIST

## 2021-10-27 PROCEDURE — 73100 X-RAY EXAM OF WRIST: CPT | Mod: 26,LT,, | Performed by: RADIOLOGY

## 2021-10-27 PROCEDURE — 99999 PR PBB SHADOW E&M-EST. PATIENT-LVL III: ICD-10-PCS | Mod: PBBFAC,,, | Performed by: OPTOMETRIST

## 2021-10-29 ENCOUNTER — OFFICE VISIT (OUTPATIENT)
Dept: OPHTHALMOLOGY | Facility: CLINIC | Age: 86
End: 2021-10-29
Payer: MEDICARE

## 2021-10-29 DIAGNOSIS — H40.1122 PRIMARY OPEN ANGLE GLAUCOMA (POAG) OF LEFT EYE, MODERATE STAGE: ICD-10-CM

## 2021-10-29 DIAGNOSIS — H16.211 EXPOSURE KERATOCONJUNCTIVITIS OF RIGHT EYE: Primary | ICD-10-CM

## 2021-10-29 PROCEDURE — 99213 OFFICE O/P EST LOW 20 MIN: CPT | Mod: S$GLB,,, | Performed by: OPTOMETRIST

## 2021-10-29 PROCEDURE — 1159F PR MEDICATION LIST DOCUMENTED IN MEDICAL RECORD: ICD-10-PCS | Mod: CPTII,S$GLB,, | Performed by: OPTOMETRIST

## 2021-10-29 PROCEDURE — 99213 PR OFFICE/OUTPT VISIT, EST, LEVL III, 20-29 MIN: ICD-10-PCS | Mod: S$GLB,,, | Performed by: OPTOMETRIST

## 2021-10-29 PROCEDURE — 99999 PR PBB SHADOW E&M-EST. PATIENT-LVL III: CPT | Mod: PBBFAC,,, | Performed by: OPTOMETRIST

## 2021-10-29 PROCEDURE — 1159F MED LIST DOCD IN RCRD: CPT | Mod: CPTII,S$GLB,, | Performed by: OPTOMETRIST

## 2021-10-29 PROCEDURE — 99999 PR PBB SHADOW E&M-EST. PATIENT-LVL III: ICD-10-PCS | Mod: PBBFAC,,, | Performed by: OPTOMETRIST

## 2021-11-01 ENCOUNTER — IMMUNIZATION (OUTPATIENT)
Dept: PRIMARY CARE CLINIC | Facility: CLINIC | Age: 86
End: 2021-11-01
Payer: MEDICARE

## 2021-11-01 DIAGNOSIS — Z23 NEED FOR VACCINATION: Primary | ICD-10-CM

## 2021-11-01 PROCEDURE — 91300 COVID-19, MRNA, LNP-S, PF, 30 MCG/0.3 ML DOSE VACCINE: CPT | Mod: PBBFAC | Performed by: FAMILY MEDICINE

## 2021-11-03 ENCOUNTER — OFFICE VISIT (OUTPATIENT)
Dept: OPHTHALMOLOGY | Facility: CLINIC | Age: 86
End: 2021-11-03
Payer: MEDICARE

## 2021-11-03 ENCOUNTER — OFFICE VISIT (OUTPATIENT)
Dept: PRIMARY CARE CLINIC | Facility: CLINIC | Age: 86
End: 2021-11-03
Payer: MEDICARE

## 2021-11-03 VITALS
BODY MASS INDEX: 23.68 KG/M2 | WEIGHT: 174.81 LBS | SYSTOLIC BLOOD PRESSURE: 142 MMHG | HEART RATE: 63 BPM | TEMPERATURE: 98 F | DIASTOLIC BLOOD PRESSURE: 82 MMHG | HEIGHT: 72 IN

## 2021-11-03 DIAGNOSIS — M19.90 OSTEOARTHRITIS, UNSPECIFIED OSTEOARTHRITIS TYPE, UNSPECIFIED SITE: ICD-10-CM

## 2021-11-03 DIAGNOSIS — Z74.09 IMPAIRED FUNCTIONAL MOBILITY, BALANCE, GAIT, AND ENDURANCE: Primary | ICD-10-CM

## 2021-11-03 DIAGNOSIS — Z95.0 CARDIAC PACEMAKER: ICD-10-CM

## 2021-11-03 DIAGNOSIS — D69.6 THROMBOCYTOPENIA, UNSPECIFIED: ICD-10-CM

## 2021-11-03 DIAGNOSIS — H40.1122 PRIMARY OPEN ANGLE GLAUCOMA (POAG) OF LEFT EYE, MODERATE STAGE: ICD-10-CM

## 2021-11-03 DIAGNOSIS — H16.211 EXPOSURE KERATOPATHY, RIGHT: Primary | ICD-10-CM

## 2021-11-03 DIAGNOSIS — F32.A DEPRESSION, UNSPECIFIED DEPRESSION TYPE: ICD-10-CM

## 2021-11-03 DIAGNOSIS — M19.042 PRIMARY OSTEOARTHRITIS OF LEFT HAND: ICD-10-CM

## 2021-11-03 DIAGNOSIS — M62.81 PROXIMAL MUSCLE WEAKNESS: ICD-10-CM

## 2021-11-03 DIAGNOSIS — M81.0 OSTEOPOROSIS, UNSPECIFIED OSTEOPOROSIS TYPE, UNSPECIFIED PATHOLOGICAL FRACTURE PRESENCE: ICD-10-CM

## 2021-11-03 DIAGNOSIS — R53.83 FATIGUE, UNSPECIFIED TYPE: ICD-10-CM

## 2021-11-03 DIAGNOSIS — H04.123 DRY EYE SYNDROME, BILATERAL: ICD-10-CM

## 2021-11-03 DIAGNOSIS — M51.37 DDD (DEGENERATIVE DISC DISEASE), LUMBOSACRAL: ICD-10-CM

## 2021-11-03 DIAGNOSIS — I25.119 CORONARY ARTERY DISEASE INVOLVING NATIVE CORONARY ARTERY OF NATIVE HEART WITH ANGINA PECTORIS: ICD-10-CM

## 2021-11-03 DIAGNOSIS — M54.9 CHRONIC MIDLINE BACK PAIN, UNSPECIFIED BACK LOCATION: ICD-10-CM

## 2021-11-03 DIAGNOSIS — I73.9 PERIPHERAL VASCULAR DISEASE: ICD-10-CM

## 2021-11-03 DIAGNOSIS — G89.29 CHRONIC MIDLINE BACK PAIN, UNSPECIFIED BACK LOCATION: ICD-10-CM

## 2021-11-03 DIAGNOSIS — H10.501 BLEPHAROCONJUNCTIVITIS OF RIGHT EYE, UNSPECIFIED BLEPHAROCONJUNCTIVITIS TYPE: ICD-10-CM

## 2021-11-03 DIAGNOSIS — I48.20 ATRIAL FIBRILLATION, CHRONIC: ICD-10-CM

## 2021-11-03 DIAGNOSIS — M48.061 SPINAL STENOSIS OF LUMBAR REGION AT MULTIPLE LEVELS: ICD-10-CM

## 2021-11-03 DIAGNOSIS — E53.8 B12 DEFICIENCY DUE TO DIET: ICD-10-CM

## 2021-11-03 PROCEDURE — 1160F RVW MEDS BY RX/DR IN RCRD: CPT | Mod: CPTII,S$GLB,, | Performed by: FAMILY MEDICINE

## 2021-11-03 PROCEDURE — 1101F PT FALLS ASSESS-DOCD LE1/YR: CPT | Mod: CPTII,S$GLB,, | Performed by: FAMILY MEDICINE

## 2021-11-03 PROCEDURE — 1125F AMNT PAIN NOTED PAIN PRSNT: CPT | Mod: CPTII,S$GLB,, | Performed by: FAMILY MEDICINE

## 2021-11-03 PROCEDURE — 1159F PR MEDICATION LIST DOCUMENTED IN MEDICAL RECORD: ICD-10-PCS | Mod: CPTII,S$GLB,, | Performed by: FAMILY MEDICINE

## 2021-11-03 PROCEDURE — 3288F FALL RISK ASSESSMENT DOCD: CPT | Mod: CPTII,S$GLB,, | Performed by: FAMILY MEDICINE

## 2021-11-03 PROCEDURE — 99213 PR OFFICE/OUTPT VISIT, EST, LEVL III, 20-29 MIN: ICD-10-PCS | Mod: S$GLB,,, | Performed by: OPTOMETRIST

## 2021-11-03 PROCEDURE — 99215 OFFICE O/P EST HI 40 MIN: CPT | Mod: S$GLB,,, | Performed by: FAMILY MEDICINE

## 2021-11-03 PROCEDURE — 99215 PR OFFICE/OUTPT VISIT, EST, LEVL V, 40-54 MIN: ICD-10-PCS | Mod: S$GLB,,, | Performed by: FAMILY MEDICINE

## 2021-11-03 PROCEDURE — 3288F PR FALLS RISK ASSESSMENT DOCUMENTED: ICD-10-PCS | Mod: CPTII,S$GLB,, | Performed by: FAMILY MEDICINE

## 2021-11-03 PROCEDURE — 1125F PR PAIN SEVERITY QUANTIFIED, PAIN PRESENT: ICD-10-PCS | Mod: CPTII,S$GLB,, | Performed by: FAMILY MEDICINE

## 2021-11-03 PROCEDURE — 99999 PR PBB SHADOW E&M-EST. PATIENT-LVL III: ICD-10-PCS | Mod: PBBFAC,,, | Performed by: OPTOMETRIST

## 2021-11-03 PROCEDURE — 1159F PR MEDICATION LIST DOCUMENTED IN MEDICAL RECORD: ICD-10-PCS | Mod: CPTII,S$GLB,, | Performed by: OPTOMETRIST

## 2021-11-03 PROCEDURE — 1159F MED LIST DOCD IN RCRD: CPT | Mod: CPTII,S$GLB,, | Performed by: FAMILY MEDICINE

## 2021-11-03 PROCEDURE — 1160F PR REVIEW ALL MEDS BY PRESCRIBER/CLIN PHARMACIST DOCUMENTED: ICD-10-PCS | Mod: CPTII,S$GLB,, | Performed by: FAMILY MEDICINE

## 2021-11-03 PROCEDURE — 99999 PR PBB SHADOW E&M-EST. PATIENT-LVL IV: CPT | Mod: PBBFAC,,, | Performed by: FAMILY MEDICINE

## 2021-11-03 PROCEDURE — 99999 PR PBB SHADOW E&M-EST. PATIENT-LVL III: CPT | Mod: PBBFAC,,, | Performed by: OPTOMETRIST

## 2021-11-03 PROCEDURE — 1101F PR PT FALLS ASSESS DOC 0-1 FALLS W/OUT INJ PAST YR: ICD-10-PCS | Mod: CPTII,S$GLB,, | Performed by: FAMILY MEDICINE

## 2021-11-03 PROCEDURE — 99999 PR PBB SHADOW E&M-EST. PATIENT-LVL IV: ICD-10-PCS | Mod: PBBFAC,,, | Performed by: FAMILY MEDICINE

## 2021-11-03 PROCEDURE — 1159F MED LIST DOCD IN RCRD: CPT | Mod: CPTII,S$GLB,, | Performed by: OPTOMETRIST

## 2021-11-03 PROCEDURE — 99213 OFFICE O/P EST LOW 20 MIN: CPT | Mod: S$GLB,,, | Performed by: OPTOMETRIST

## 2021-11-03 RX ORDER — MOXIFLOXACIN 5 MG/ML
1 SOLUTION/ DROPS OPHTHALMIC 4 TIMES DAILY
Qty: 3 ML | Refills: 0 | Status: SHIPPED | OUTPATIENT
Start: 2021-11-03 | End: 2021-11-10

## 2021-11-03 RX ORDER — LOTEPREDNOL ETABONATE 5 MG/ML
SUSPENSION/ DROPS OPHTHALMIC
COMMUNITY
Start: 2021-11-01 | End: 2022-01-27 | Stop reason: ALTCHOICE

## 2021-11-03 RX ORDER — POLYETHYLENE GLYCOL 400 AND PROPYLENE GLYCOL 4; 3 MG/ML; MG/ML
SOLUTION/ DROPS OPHTHALMIC
COMMUNITY
End: 2022-10-14 | Stop reason: ALTCHOICE

## 2021-11-09 ENCOUNTER — OFFICE VISIT (OUTPATIENT)
Dept: INTERNAL MEDICINE | Facility: CLINIC | Age: 86
End: 2021-11-09
Payer: MEDICARE

## 2021-11-09 VITALS
WEIGHT: 174.19 LBS | HEART RATE: 72 BPM | TEMPERATURE: 98 F | HEIGHT: 72 IN | BODY MASS INDEX: 23.59 KG/M2 | RESPIRATION RATE: 18 BRPM | SYSTOLIC BLOOD PRESSURE: 138 MMHG | DIASTOLIC BLOOD PRESSURE: 70 MMHG

## 2021-11-09 DIAGNOSIS — I48.20 ATRIAL FIBRILLATION, CHRONIC: ICD-10-CM

## 2021-11-09 DIAGNOSIS — H40.9 GLAUCOMA OF BOTH EYES, UNSPECIFIED GLAUCOMA TYPE: ICD-10-CM

## 2021-11-09 DIAGNOSIS — Z79.01 LONG TERM (CURRENT) USE OF ANTICOAGULANTS: ICD-10-CM

## 2021-11-09 DIAGNOSIS — M48.061 SPINAL STENOSIS OF LUMBAR REGION AT MULTIPLE LEVELS: ICD-10-CM

## 2021-11-09 DIAGNOSIS — D69.6 THROMBOCYTOPENIA, UNSPECIFIED: ICD-10-CM

## 2021-11-09 DIAGNOSIS — E53.8 B12 DEFICIENCY DUE TO DIET: ICD-10-CM

## 2021-11-09 DIAGNOSIS — R39.12 BENIGN PROSTATIC HYPERPLASIA WITH WEAK URINARY STREAM: ICD-10-CM

## 2021-11-09 DIAGNOSIS — Z95.2 HX OF PROSTHETIC MITRAL VALVE: ICD-10-CM

## 2021-11-09 DIAGNOSIS — I25.119 CORONARY ARTERY DISEASE INVOLVING NATIVE CORONARY ARTERY OF NATIVE HEART WITH ANGINA PECTORIS: ICD-10-CM

## 2021-11-09 DIAGNOSIS — M46.1 SACROILIITIS: ICD-10-CM

## 2021-11-09 DIAGNOSIS — Z99.89 DEPENDENCE ON OTHER ENABLING MACHINES AND DEVICES: ICD-10-CM

## 2021-11-09 DIAGNOSIS — Z74.09 IMPAIRED MOBILITY: ICD-10-CM

## 2021-11-09 DIAGNOSIS — Z00.00 ENCOUNTER FOR PREVENTIVE HEALTH EXAMINATION: Primary | ICD-10-CM

## 2021-11-09 DIAGNOSIS — F32.A DEPRESSION, UNSPECIFIED DEPRESSION TYPE: ICD-10-CM

## 2021-11-09 DIAGNOSIS — M81.0 OSTEOPOROSIS, UNSPECIFIED OSTEOPOROSIS TYPE, UNSPECIFIED PATHOLOGICAL FRACTURE PRESENCE: ICD-10-CM

## 2021-11-09 DIAGNOSIS — N40.1 BENIGN PROSTATIC HYPERPLASIA WITH WEAK URINARY STREAM: ICD-10-CM

## 2021-11-09 DIAGNOSIS — Z95.0 CARDIAC PACEMAKER: ICD-10-CM

## 2021-11-09 DIAGNOSIS — I73.9 PERIPHERAL VASCULAR DISEASE: ICD-10-CM

## 2021-11-09 PROCEDURE — 99999 PR PBB SHADOW E&M-EST. PATIENT-LVL V: CPT | Mod: PBBFAC,,, | Performed by: NURSE PRACTITIONER

## 2021-11-09 PROCEDURE — 1101F PT FALLS ASSESS-DOCD LE1/YR: CPT | Mod: CPTII,S$GLB,, | Performed by: NURSE PRACTITIONER

## 2021-11-09 PROCEDURE — 1160F PR REVIEW ALL MEDS BY PRESCRIBER/CLIN PHARMACIST DOCUMENTED: ICD-10-PCS | Mod: CPTII,S$GLB,, | Performed by: NURSE PRACTITIONER

## 2021-11-09 PROCEDURE — 1159F MED LIST DOCD IN RCRD: CPT | Mod: CPTII,S$GLB,, | Performed by: NURSE PRACTITIONER

## 2021-11-09 PROCEDURE — 1170F FXNL STATUS ASSESSED: CPT | Mod: CPTII,S$GLB,, | Performed by: NURSE PRACTITIONER

## 2021-11-09 PROCEDURE — 3288F FALL RISK ASSESSMENT DOCD: CPT | Mod: CPTII,S$GLB,, | Performed by: NURSE PRACTITIONER

## 2021-11-09 PROCEDURE — G0439 PPPS, SUBSEQ VISIT: HCPCS | Mod: S$GLB,,, | Performed by: NURSE PRACTITIONER

## 2021-11-09 PROCEDURE — 1125F PR PAIN SEVERITY QUANTIFIED, PAIN PRESENT: ICD-10-PCS | Mod: CPTII,S$GLB,, | Performed by: NURSE PRACTITIONER

## 2021-11-09 PROCEDURE — 99999 PR PBB SHADOW E&M-EST. PATIENT-LVL V: ICD-10-PCS | Mod: PBBFAC,,, | Performed by: NURSE PRACTITIONER

## 2021-11-09 PROCEDURE — 1170F PR FUNCTIONAL STATUS ASSESSED: ICD-10-PCS | Mod: CPTII,S$GLB,, | Performed by: NURSE PRACTITIONER

## 2021-11-09 PROCEDURE — 1160F RVW MEDS BY RX/DR IN RCRD: CPT | Mod: CPTII,S$GLB,, | Performed by: NURSE PRACTITIONER

## 2021-11-09 PROCEDURE — 3288F PR FALLS RISK ASSESSMENT DOCUMENTED: ICD-10-PCS | Mod: CPTII,S$GLB,, | Performed by: NURSE PRACTITIONER

## 2021-11-09 PROCEDURE — 1159F PR MEDICATION LIST DOCUMENTED IN MEDICAL RECORD: ICD-10-PCS | Mod: CPTII,S$GLB,, | Performed by: NURSE PRACTITIONER

## 2021-11-09 PROCEDURE — G0439 PR MEDICARE ANNUAL WELLNESS SUBSEQUENT VISIT: ICD-10-PCS | Mod: S$GLB,,, | Performed by: NURSE PRACTITIONER

## 2021-11-09 PROCEDURE — 1125F AMNT PAIN NOTED PAIN PRSNT: CPT | Mod: CPTII,S$GLB,, | Performed by: NURSE PRACTITIONER

## 2021-11-09 PROCEDURE — 1101F PR PT FALLS ASSESS DOC 0-1 FALLS W/OUT INJ PAST YR: ICD-10-PCS | Mod: CPTII,S$GLB,, | Performed by: NURSE PRACTITIONER

## 2021-12-07 ENCOUNTER — TELEPHONE (OUTPATIENT)
Dept: PRIMARY CARE CLINIC | Facility: CLINIC | Age: 86
End: 2021-12-07
Payer: MEDICARE

## 2021-12-08 ENCOUNTER — OFFICE VISIT (OUTPATIENT)
Dept: OPHTHALMOLOGY | Facility: CLINIC | Age: 86
End: 2021-12-08
Payer: MEDICARE

## 2021-12-08 DIAGNOSIS — H10.9 BACTERIAL CONJUNCTIVITIS OF RIGHT EYE: Primary | ICD-10-CM

## 2021-12-08 PROCEDURE — 99999 PR PBB SHADOW E&M-EST. PATIENT-LVL II: ICD-10-PCS | Mod: PBBFAC,,, | Performed by: OPTOMETRIST

## 2021-12-08 PROCEDURE — 99999 PR PBB SHADOW E&M-EST. PATIENT-LVL II: CPT | Mod: PBBFAC,,, | Performed by: OPTOMETRIST

## 2021-12-08 PROCEDURE — 99213 OFFICE O/P EST LOW 20 MIN: CPT | Mod: S$GLB,,, | Performed by: OPTOMETRIST

## 2021-12-08 PROCEDURE — 99213 PR OFFICE/OUTPT VISIT, EST, LEVL III, 20-29 MIN: ICD-10-PCS | Mod: S$GLB,,, | Performed by: OPTOMETRIST

## 2021-12-08 RX ORDER — MOXIFLOXACIN 5 MG/ML
SOLUTION/ DROPS OPHTHALMIC
Qty: 3 ML | Refills: 0 | Status: SHIPPED | OUTPATIENT
Start: 2021-12-08 | End: 2021-12-15

## 2021-12-09 ENCOUNTER — OFFICE VISIT (OUTPATIENT)
Dept: OPHTHALMOLOGY | Facility: CLINIC | Age: 86
End: 2021-12-09
Payer: MEDICARE

## 2021-12-09 DIAGNOSIS — H10.9 BACTERIAL CONJUNCTIVITIS OF RIGHT EYE: Primary | ICD-10-CM

## 2021-12-09 PROCEDURE — 99499 NO LOS: ICD-10-PCS | Mod: S$GLB,,, | Performed by: OPTOMETRIST

## 2021-12-09 PROCEDURE — 99999 PR PBB SHADOW E&M-EST. PATIENT-LVL II: ICD-10-PCS | Mod: PBBFAC,,, | Performed by: OPTOMETRIST

## 2021-12-09 PROCEDURE — 99499 UNLISTED E&M SERVICE: CPT | Mod: S$GLB,,, | Performed by: OPTOMETRIST

## 2021-12-09 PROCEDURE — 99999 PR PBB SHADOW E&M-EST. PATIENT-LVL II: CPT | Mod: PBBFAC,,, | Performed by: OPTOMETRIST

## 2021-12-09 RX ORDER — ERYTHROMYCIN 5 MG/G
OINTMENT OPHTHALMIC 2 TIMES DAILY
Qty: 3.5 G | Refills: 0 | Status: SHIPPED | OUTPATIENT
Start: 2021-12-09 | End: 2021-12-16

## 2021-12-10 ENCOUNTER — OFFICE VISIT (OUTPATIENT)
Dept: OPHTHALMOLOGY | Facility: CLINIC | Age: 86
End: 2021-12-10
Payer: MEDICARE

## 2021-12-10 DIAGNOSIS — H10.9 BACTERIAL CONJUNCTIVITIS OF RIGHT EYE: Primary | ICD-10-CM

## 2021-12-10 PROCEDURE — 99499 UNLISTED E&M SERVICE: CPT | Mod: S$GLB,,, | Performed by: OPTOMETRIST

## 2021-12-10 PROCEDURE — 99499 NO LOS: ICD-10-PCS | Mod: S$GLB,,, | Performed by: OPTOMETRIST

## 2021-12-10 PROCEDURE — 99999 PR PBB SHADOW E&M-EST. PATIENT-LVL III: ICD-10-PCS | Mod: PBBFAC,,, | Performed by: OPTOMETRIST

## 2021-12-10 PROCEDURE — 99999 PR PBB SHADOW E&M-EST. PATIENT-LVL III: CPT | Mod: PBBFAC,,, | Performed by: OPTOMETRIST

## 2021-12-14 ENCOUNTER — OFFICE VISIT (OUTPATIENT)
Dept: OPHTHALMOLOGY | Facility: CLINIC | Age: 86
End: 2021-12-14
Payer: MEDICARE

## 2021-12-14 DIAGNOSIS — H40.1122 PRIMARY OPEN ANGLE GLAUCOMA (POAG) OF LEFT EYE, MODERATE STAGE: ICD-10-CM

## 2021-12-14 DIAGNOSIS — H10.9 BACTERIAL CONJUNCTIVITIS OF RIGHT EYE: Primary | ICD-10-CM

## 2021-12-14 PROCEDURE — 99999 PR PBB SHADOW E&M-EST. PATIENT-LVL III: CPT | Mod: PBBFAC,,, | Performed by: OPTOMETRIST

## 2021-12-14 PROCEDURE — 99499 UNLISTED E&M SERVICE: CPT | Mod: S$GLB,,, | Performed by: OPTOMETRIST

## 2021-12-14 PROCEDURE — 99999 PR PBB SHADOW E&M-EST. PATIENT-LVL III: ICD-10-PCS | Mod: PBBFAC,,, | Performed by: OPTOMETRIST

## 2021-12-14 PROCEDURE — 99499 NO LOS: ICD-10-PCS | Mod: S$GLB,,, | Performed by: OPTOMETRIST

## 2021-12-21 ENCOUNTER — OFFICE VISIT (OUTPATIENT)
Dept: OPHTHALMOLOGY | Facility: CLINIC | Age: 86
End: 2021-12-21
Payer: MEDICARE

## 2021-12-21 DIAGNOSIS — H10.9 BACTERIAL CONJUNCTIVITIS OF RIGHT EYE: Primary | ICD-10-CM

## 2021-12-21 DIAGNOSIS — H16.223 KERATOCONJUNCTIVITIS SICCA OF BOTH EYES NOT SPECIFIED AS SJOGREN'S: ICD-10-CM

## 2021-12-21 DIAGNOSIS — H40.1122 PRIMARY OPEN ANGLE GLAUCOMA (POAG) OF LEFT EYE, MODERATE STAGE: ICD-10-CM

## 2021-12-21 PROCEDURE — 99999 PR PBB SHADOW E&M-EST. PATIENT-LVL III: CPT | Mod: PBBFAC,,, | Performed by: OPTOMETRIST

## 2021-12-21 PROCEDURE — 99213 OFFICE O/P EST LOW 20 MIN: CPT | Mod: S$GLB,,, | Performed by: OPTOMETRIST

## 2021-12-21 PROCEDURE — 99999 PR PBB SHADOW E&M-EST. PATIENT-LVL III: ICD-10-PCS | Mod: PBBFAC,,, | Performed by: OPTOMETRIST

## 2021-12-21 PROCEDURE — 99213 PR OFFICE/OUTPT VISIT, EST, LEVL III, 20-29 MIN: ICD-10-PCS | Mod: S$GLB,,, | Performed by: OPTOMETRIST

## 2022-01-07 ENCOUNTER — OFFICE VISIT (OUTPATIENT)
Dept: OPHTHALMOLOGY | Facility: CLINIC | Age: 87
End: 2022-01-07
Payer: MEDICARE

## 2022-01-07 DIAGNOSIS — H40.1122 PRIMARY OPEN ANGLE GLAUCOMA (POAG) OF LEFT EYE, MODERATE STAGE: ICD-10-CM

## 2022-01-07 DIAGNOSIS — M35.01 KERATOCONJUNCTIVITIS SICCA: Primary | ICD-10-CM

## 2022-01-07 DIAGNOSIS — D31.32 CHOROIDAL NEVUS OF LEFT EYE: ICD-10-CM

## 2022-01-07 PROCEDURE — 92014 COMPRE OPH EXAM EST PT 1/>: CPT | Mod: S$GLB,,, | Performed by: OPTOMETRIST

## 2022-01-07 PROCEDURE — 1159F PR MEDICATION LIST DOCUMENTED IN MEDICAL RECORD: ICD-10-PCS | Mod: CPTII,S$GLB,, | Performed by: OPTOMETRIST

## 2022-01-07 PROCEDURE — 99999 PR PBB SHADOW E&M-EST. PATIENT-LVL III: ICD-10-PCS | Mod: PBBFAC,,, | Performed by: OPTOMETRIST

## 2022-01-07 PROCEDURE — 92014 PR EYE EXAM, EST PATIENT,COMPREHESV: ICD-10-PCS | Mod: S$GLB,,, | Performed by: OPTOMETRIST

## 2022-01-07 PROCEDURE — 99999 PR PBB SHADOW E&M-EST. PATIENT-LVL III: CPT | Mod: PBBFAC,,, | Performed by: OPTOMETRIST

## 2022-01-07 PROCEDURE — 92133 CPTRZD OPH DX IMG PST SGM ON: CPT | Mod: S$GLB,,, | Performed by: OPTOMETRIST

## 2022-01-07 PROCEDURE — 92133 OCT, OPTIC NERVE - OU - BOTH EYES: ICD-10-PCS | Mod: S$GLB,,, | Performed by: OPTOMETRIST

## 2022-01-07 PROCEDURE — 1159F MED LIST DOCD IN RCRD: CPT | Mod: CPTII,S$GLB,, | Performed by: OPTOMETRIST

## 2022-01-07 RX ORDER — LATANOPROST 50 UG/ML
1 SOLUTION/ DROPS OPHTHALMIC NIGHTLY
Qty: 2.5 ML | Refills: 4 | Status: SHIPPED | OUTPATIENT
Start: 2022-01-07 | End: 2022-07-26 | Stop reason: ALTCHOICE

## 2022-01-07 NOTE — PROGRESS NOTES
HPI     Pt here for follow up visit. Pt states not having any eye pain but c/o of   still having itchy, watering and burning eyes and has been on going since   last visit. Pt denies any other ocular issues at this time. Pt compliant   with gtts and last used this morning.     MGM patient 1999     PCIOL OD 6-1998 (Dr. Green)   PCIOL OS 2-1999 (Dr. Sampson)     POAG - initally diagnosed by Dr LaFluer   Next HVF April 2022   Next OCT Jan 2022   Next DFE with SDP March 2022    Systane PF drops qid OU   Refresh Celluvisc QHS OU   Restasis BID OU  Latanoprost QHS OS    Last edited by Sajan Brown, OD on 1/7/2022 10:54 AM. (History)            Assessment /Plan     For exam results, see Encounter Report.    Keratoconjunctivitis sicca  -     OCT, Optic Nerve - OU - Both Eyes  Continue aggressive preservative free lubrication with Restasis BID and sleep mask at bedtime.     Primary open angle glaucoma (POAG) of left eye, moderate stage  -     latanoprost 0.005 % ophthalmic solution; Instill 1 drop into the left eye every evening.  Dispense: 2.5 mL; Refill: 4  -     OCT, Optic Nerve - OU - Both Eyes  gOCT stable, doing well on Latanprost QHS OS. Continue.     Choroidal Nevus left eye   Flat, no lipo observe.     RTC 3 months for HVF 24-2 and IOP check sooner if any changes to vision or worsening symptoms.     Continue   Systane PF drops qid OU   Refresh Celluvisc QHS OU   Restasis BID OU  Latanoprost QHS OS

## 2022-01-27 ENCOUNTER — OFFICE VISIT (OUTPATIENT)
Dept: OPHTHALMOLOGY | Facility: CLINIC | Age: 87
End: 2022-01-27
Payer: MEDICARE

## 2022-01-27 DIAGNOSIS — H01.02B SQUAMOUS BLEPHARITIS OF UPPER AND LOWER EYELIDS OF BOTH EYES: Primary | ICD-10-CM

## 2022-01-27 DIAGNOSIS — H01.02A SQUAMOUS BLEPHARITIS OF UPPER AND LOWER EYELIDS OF BOTH EYES: Primary | ICD-10-CM

## 2022-01-27 DIAGNOSIS — H40.1121 PRIMARY OPEN ANGLE GLAUCOMA (POAG) OF LEFT EYE, MILD STAGE: ICD-10-CM

## 2022-01-27 DIAGNOSIS — H16.213 EXPOSURE KERATOCONJUNCTIVITIS OF BOTH EYES: ICD-10-CM

## 2022-01-27 DIAGNOSIS — M35.01 KERATITIS SICCA, BILATERAL: ICD-10-CM

## 2022-01-27 PROCEDURE — 99999 PR PBB SHADOW E&M-EST. PATIENT-LVL III: ICD-10-PCS | Mod: PBBFAC,,, | Performed by: OPTOMETRIST

## 2022-01-27 PROCEDURE — 1159F PR MEDICATION LIST DOCUMENTED IN MEDICAL RECORD: ICD-10-PCS | Mod: CPTII,S$GLB,, | Performed by: OPTOMETRIST

## 2022-01-27 PROCEDURE — 99999 PR PBB SHADOW E&M-EST. PATIENT-LVL III: CPT | Mod: PBBFAC,,, | Performed by: OPTOMETRIST

## 2022-01-27 PROCEDURE — 1159F MED LIST DOCD IN RCRD: CPT | Mod: CPTII,S$GLB,, | Performed by: OPTOMETRIST

## 2022-01-27 PROCEDURE — 99213 PR OFFICE/OUTPT VISIT, EST, LEVL III, 20-29 MIN: ICD-10-PCS | Mod: S$GLB,,, | Performed by: OPTOMETRIST

## 2022-01-27 PROCEDURE — 99213 OFFICE O/P EST LOW 20 MIN: CPT | Mod: S$GLB,,, | Performed by: OPTOMETRIST

## 2022-01-27 RX ORDER — NEOMYCIN SULFATE, POLYMYXIN B SULFATE, AND DEXAMETHASONE 3.5; 10000; 1 MG/G; [USP'U]/G; MG/G
OINTMENT OPHTHALMIC 2 TIMES DAILY
Qty: 3.5 G | Refills: 0 | Status: SHIPPED | OUTPATIENT
Start: 2022-01-27 | End: 2022-02-04

## 2022-01-27 RX ORDER — CYCLOSPORINE 0.5 MG/ML
1 EMULSION OPHTHALMIC 2 TIMES DAILY
Qty: 180 EACH | Refills: 3 | Status: SHIPPED | OUTPATIENT
Start: 2022-01-27 | End: 2022-10-14 | Stop reason: ALTCHOICE

## 2022-01-27 NOTE — Clinical Note
Hey guys can we get this patient in with Dr Herman for a punctal plug eval, tell dr Herman im not sure if he's a good candidate but i've tried everything to manage his dry eyes yet he still remains symptomatic.   He states he had punctal plugs done at an outside clinic with some success.

## 2022-01-27 NOTE — PROGRESS NOTES
HPI     Patient here today for FB sensation with itching and irritation  Patient states this has been going on for a few days  Using drops prescribed by Dr. Brown    PCIOL OD 6-1998 (Dr. Green)   PCIOL OS 2-1999 (Dr. Sampson)     POAG - initally diagnosed by Dr LaFluer   Next [facility] March 2022   Next OCT July 2022   Next DFE with SDP March 2022    Systane PF drops qid OU   Refresh Celluvisc QHS OU   Restasis BID OU  Latanoprost QHS OS    Last edited by Margaret Maldonado, PCT on 1/27/2022  2:54 PM. (History)              Assessment /Plan     For exam results, see Encounter Report.    Squamous blepharitis of upper and lower eyelids of both eyes  -     neomycin-polymyxin-dexamethasone (MAXITROL) 3.5 mg/g-10,000 unit/g-0.1 % Oint; Place into both eyes 2 (two) times a day. for 7 days  Dispense: 3.5 g; Refill: 0  Start maxitrol marisol to lids and lashes BID x 7 days then stop. Consider low dose Doxy if no improvement with maxitrol     Keratitis sicca, bilateral  -     cycloSPORINE (RESTASIS) 0.05 % ophthalmic emulsion; Place 1 drop into both eyes 2 (two) times daily.  Dispense: 180 each; Refill: 3  Continue Restasis BID.     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     Primary open angle glaucoma (POAG) of left eye, mild stage  IOP at target continue latanprost at bedtime. Next [facility] March 2022.     Use   Systane PF drops qid OU   Refresh Celluvisc QHS OU   Restasis BID OU  Maxitrol marisol BID OU  Latanoprost QHS OS    Patient continues to remain symptomatic after trial of bandage contact lens for dryness, and exhaustive list of aggressive lubrication with minimal relief. Patient states he previously had punctul plugs inserted in both eyes at outside clinic which helped relieve symptoms.   No PP present on examination today.    Will consult Dr Herman for punctal plug reevaluation and dry eye consult.

## 2022-02-01 ENCOUNTER — OFFICE VISIT (OUTPATIENT)
Dept: OPHTHALMOLOGY | Facility: CLINIC | Age: 87
End: 2022-02-01
Payer: MEDICARE

## 2022-02-01 ENCOUNTER — PATIENT OUTREACH (OUTPATIENT)
Dept: ADMINISTRATIVE | Facility: OTHER | Age: 87
End: 2022-02-01
Payer: MEDICARE

## 2022-02-01 DIAGNOSIS — M35.01 KERATITIS SICCA, BILATERAL: Primary | ICD-10-CM

## 2022-02-01 DIAGNOSIS — H16.213 EXPOSURE KERATOCONJUNCTIVITIS OF BOTH EYES: ICD-10-CM

## 2022-02-01 DIAGNOSIS — H01.02B SQUAMOUS BLEPHARITIS OF UPPER AND LOWER EYELIDS OF BOTH EYES: ICD-10-CM

## 2022-02-01 DIAGNOSIS — H01.02A SQUAMOUS BLEPHARITIS OF UPPER AND LOWER EYELIDS OF BOTH EYES: ICD-10-CM

## 2022-02-01 PROCEDURE — 99999 PR PBB SHADOW E&M-EST. PATIENT-LVL III: ICD-10-PCS | Mod: PBBFAC,,, | Performed by: STUDENT IN AN ORGANIZED HEALTH CARE EDUCATION/TRAINING PROGRAM

## 2022-02-01 PROCEDURE — 99214 OFFICE O/P EST MOD 30 MIN: CPT | Mod: 25,S$GLB,, | Performed by: STUDENT IN AN ORGANIZED HEALTH CARE EDUCATION/TRAINING PROGRAM

## 2022-02-01 PROCEDURE — 68761 PR CLOSE TEAR DUCT OPENING BY PLUG,EA: ICD-10-PCS | Mod: 50,S$GLB,, | Performed by: STUDENT IN AN ORGANIZED HEALTH CARE EDUCATION/TRAINING PROGRAM

## 2022-02-01 PROCEDURE — 68761 CLOSE TEAR DUCT OPENING: CPT | Mod: 50,S$GLB,, | Performed by: STUDENT IN AN ORGANIZED HEALTH CARE EDUCATION/TRAINING PROGRAM

## 2022-02-01 PROCEDURE — 1160F RVW MEDS BY RX/DR IN RCRD: CPT | Mod: CPTII,S$GLB,, | Performed by: STUDENT IN AN ORGANIZED HEALTH CARE EDUCATION/TRAINING PROGRAM

## 2022-02-01 PROCEDURE — 1159F MED LIST DOCD IN RCRD: CPT | Mod: CPTII,S$GLB,, | Performed by: STUDENT IN AN ORGANIZED HEALTH CARE EDUCATION/TRAINING PROGRAM

## 2022-02-01 PROCEDURE — 99999 PR PBB SHADOW E&M-EST. PATIENT-LVL III: CPT | Mod: PBBFAC,,, | Performed by: STUDENT IN AN ORGANIZED HEALTH CARE EDUCATION/TRAINING PROGRAM

## 2022-02-01 PROCEDURE — 1160F PR REVIEW ALL MEDS BY PRESCRIBER/CLIN PHARMACIST DOCUMENTED: ICD-10-PCS | Mod: CPTII,S$GLB,, | Performed by: STUDENT IN AN ORGANIZED HEALTH CARE EDUCATION/TRAINING PROGRAM

## 2022-02-01 PROCEDURE — 99214 PR OFFICE/OUTPT VISIT, EST, LEVL IV, 30-39 MIN: ICD-10-PCS | Mod: 25,S$GLB,, | Performed by: STUDENT IN AN ORGANIZED HEALTH CARE EDUCATION/TRAINING PROGRAM

## 2022-02-01 PROCEDURE — 1126F AMNT PAIN NOTED NONE PRSNT: CPT | Mod: CPTII,S$GLB,, | Performed by: STUDENT IN AN ORGANIZED HEALTH CARE EDUCATION/TRAINING PROGRAM

## 2022-02-01 PROCEDURE — 1126F PR PAIN SEVERITY QUANTIFIED, NO PAIN PRESENT: ICD-10-PCS | Mod: CPTII,S$GLB,, | Performed by: STUDENT IN AN ORGANIZED HEALTH CARE EDUCATION/TRAINING PROGRAM

## 2022-02-01 PROCEDURE — 1159F PR MEDICATION LIST DOCUMENTED IN MEDICAL RECORD: ICD-10-PCS | Mod: CPTII,S$GLB,, | Performed by: STUDENT IN AN ORGANIZED HEALTH CARE EDUCATION/TRAINING PROGRAM

## 2022-02-01 NOTE — PROGRESS NOTES
Health Maintenance Due   Topic Date Due    Shingles Vaccine (2 of 3) 04/11/2013     Updates were requested from care everywhere.  Chart was reviewed for overdue Proactive [facility] Encounters ([facility]) topics (CRS, Breast Cancer Screening, Eye exam)  Health Maintenance has been updated.  LINKS immunization registry triggered.  Immunizations were reconciled.

## 2022-02-01 NOTE — PROGRESS NOTES
HPI     Pt here per DKT for dry eye and punctual plugs evaluation. Pt states VA   stable from last exam. PT states not having any eye pain but c/o of   discomfort due to dryness of eyes. Pt c/o of excessive watering and   grittiness in BOTH eyes. Pt compliant with gtts and last used this   morning.     MGM patient 1999     PCIOL OD 6-1998 (Dr. Green)   PCIOL OS 2-1999 (Dr. Sampson)     POAG - initally diagnosed by Dr LaFluer   Next HVF March 2022   Next OCT July 2022   Next DFE with SDP March 2022    Systane PF drops qid OU   Refresh Celluvisc QHS OU   Restasis BID OU  Latanoprost QHS OS    Last edited by Sandra Patterson on 2/1/2022 12:39 PM. (History)            Assessment /Plan     For exam results, see Encounter Report.    Keratitis sicca, bilateral- PROCEDURE: PUNCTAL PLUG INSERTION     Patient has symptomatic dry eyes. Risk, benefits and alternatives to punctal plug insertion was reviewed and pt requested that this be performed at this time.  Tetracaine gtts introduced into both eyes and punctal plugs inserted in the bilateral lower puncta without difficulty. Patient tolerated procedure well     0.6 mm Eagle silicone punctal plug(s) utilized.  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

## 2022-02-08 ENCOUNTER — OFFICE VISIT (OUTPATIENT)
Dept: PRIMARY CARE CLINIC | Facility: CLINIC | Age: 87
End: 2022-02-08
Payer: MEDICARE

## 2022-02-08 ENCOUNTER — LAB VISIT (OUTPATIENT)
Dept: LAB | Facility: HOSPITAL | Age: 87
End: 2022-02-08
Attending: NURSE PRACTITIONER
Payer: MEDICARE

## 2022-02-08 VITALS
HEART RATE: 61 BPM | BODY MASS INDEX: 23.52 KG/M2 | RESPIRATION RATE: 18 BRPM | TEMPERATURE: 98 F | OXYGEN SATURATION: 99 % | SYSTOLIC BLOOD PRESSURE: 130 MMHG | HEIGHT: 72 IN | DIASTOLIC BLOOD PRESSURE: 60 MMHG | WEIGHT: 173.63 LBS

## 2022-02-08 DIAGNOSIS — K11.7 XEROSTOMIA: ICD-10-CM

## 2022-02-08 DIAGNOSIS — R53.83 FATIGUE, UNSPECIFIED TYPE: ICD-10-CM

## 2022-02-08 DIAGNOSIS — H04.123 DRY EYES: ICD-10-CM

## 2022-02-08 DIAGNOSIS — I48.20 ATRIAL FIBRILLATION, CHRONIC: ICD-10-CM

## 2022-02-08 DIAGNOSIS — R06.02 SHORTNESS OF BREATH: ICD-10-CM

## 2022-02-08 DIAGNOSIS — M79.671 FOOT PAIN, BILATERAL: ICD-10-CM

## 2022-02-08 DIAGNOSIS — M79.672 FOOT PAIN, BILATERAL: ICD-10-CM

## 2022-02-08 DIAGNOSIS — D69.6 THROMBOCYTOPENIA, UNSPECIFIED: Primary | ICD-10-CM

## 2022-02-08 DIAGNOSIS — M48.061 SPINAL STENOSIS OF LUMBAR REGION AT MULTIPLE LEVELS: ICD-10-CM

## 2022-02-08 LAB
ALBUMIN SERPL BCP-MCNC: 3.6 G/DL (ref 3.5–5.2)
ALP SERPL-CCNC: 72 U/L (ref 55–135)
ALT SERPL W/O P-5'-P-CCNC: 7 U/L (ref 10–44)
ANION GAP SERPL CALC-SCNC: 4 MMOL/L (ref 8–16)
AST SERPL-CCNC: 16 U/L (ref 10–40)
BASOPHILS # BLD AUTO: 0.09 K/UL (ref 0–0.2)
BASOPHILS NFR BLD: 1.6 % (ref 0–1.9)
BILIRUB SERPL-MCNC: 1.2 MG/DL (ref 0.1–1)
BUN SERPL-MCNC: 11 MG/DL (ref 10–30)
CALCIUM SERPL-MCNC: 9.2 MG/DL (ref 8.7–10.5)
CHLORIDE SERPL-SCNC: 104 MMOL/L (ref 95–110)
CO2 SERPL-SCNC: 27 MMOL/L (ref 23–29)
CREAT SERPL-MCNC: 0.9 MG/DL (ref 0.5–1.4)
DIFFERENTIAL METHOD: ABNORMAL
EOSINOPHIL # BLD AUTO: 0.5 K/UL (ref 0–0.5)
EOSINOPHIL NFR BLD: 8.3 % (ref 0–8)
ERYTHROCYTE [DISTWIDTH] IN BLOOD BY AUTOMATED COUNT: 14.7 % (ref 11.5–14.5)
EST. GFR  (AFRICAN AMERICAN): >60 ML/MIN/1.73 M^2
EST. GFR  (NON AFRICAN AMERICAN): >60 ML/MIN/1.73 M^2
GLUCOSE SERPL-MCNC: 77 MG/DL (ref 70–110)
HCT VFR BLD AUTO: 40.3 % (ref 40–54)
HGB BLD-MCNC: 13 G/DL (ref 14–18)
IMM GRANULOCYTES # BLD AUTO: 0.01 K/UL (ref 0–0.04)
IMM GRANULOCYTES NFR BLD AUTO: 0.2 % (ref 0–0.5)
LYMPHOCYTES # BLD AUTO: 1.4 K/UL (ref 1–4.8)
LYMPHOCYTES NFR BLD: 25 % (ref 18–48)
MCH RBC QN AUTO: 30.8 PG (ref 27–31)
MCHC RBC AUTO-ENTMCNC: 32.3 G/DL (ref 32–36)
MCV RBC AUTO: 96 FL (ref 82–98)
MONOCYTES # BLD AUTO: 0.6 K/UL (ref 0.3–1)
MONOCYTES NFR BLD: 11.2 % (ref 4–15)
NEUTROPHILS # BLD AUTO: 3 K/UL (ref 1.8–7.7)
NEUTROPHILS NFR BLD: 53.7 % (ref 38–73)
NRBC BLD-RTO: 0 /100 WBC
PLATELET # BLD AUTO: 169 K/UL (ref 150–450)
PMV BLD AUTO: 11.5 FL (ref 9.2–12.9)
POTASSIUM SERPL-SCNC: 4.5 MMOL/L (ref 3.5–5.1)
PROT SERPL-MCNC: 6.5 G/DL (ref 6–8.4)
RBC # BLD AUTO: 4.22 M/UL (ref 4.6–6.2)
SODIUM SERPL-SCNC: 135 MMOL/L (ref 136–145)
WBC # BLD AUTO: 5.55 K/UL (ref 3.9–12.7)

## 2022-02-08 PROCEDURE — 86039 ANTINUCLEAR ANTIBODIES (ANA): CPT | Performed by: NURSE PRACTITIONER

## 2022-02-08 PROCEDURE — 1160F RVW MEDS BY RX/DR IN RCRD: CPT | Mod: CPTII,S$GLB,, | Performed by: NURSE PRACTITIONER

## 2022-02-08 PROCEDURE — 99999 PR PBB SHADOW E&M-EST. PATIENT-LVL IV: ICD-10-PCS | Mod: PBBFAC,,, | Performed by: NURSE PRACTITIONER

## 2022-02-08 PROCEDURE — 1126F PR PAIN SEVERITY QUANTIFIED, NO PAIN PRESENT: ICD-10-PCS | Mod: CPTII,S$GLB,, | Performed by: NURSE PRACTITIONER

## 2022-02-08 PROCEDURE — 3288F PR FALLS RISK ASSESSMENT DOCUMENTED: ICD-10-PCS | Mod: CPTII,S$GLB,, | Performed by: NURSE PRACTITIONER

## 2022-02-08 PROCEDURE — 86225 DNA ANTIBODY NATIVE: CPT | Performed by: NURSE PRACTITIONER

## 2022-02-08 PROCEDURE — 1126F AMNT PAIN NOTED NONE PRSNT: CPT | Mod: CPTII,S$GLB,, | Performed by: NURSE PRACTITIONER

## 2022-02-08 PROCEDURE — 1101F PT FALLS ASSESS-DOCD LE1/YR: CPT | Mod: CPTII,S$GLB,, | Performed by: NURSE PRACTITIONER

## 2022-02-08 PROCEDURE — 99999 PR PBB SHADOW E&M-EST. PATIENT-LVL IV: CPT | Mod: PBBFAC,,, | Performed by: NURSE PRACTITIONER

## 2022-02-08 PROCEDURE — 1160F PR REVIEW ALL MEDS BY PRESCRIBER/CLIN PHARMACIST DOCUMENTED: ICD-10-PCS | Mod: CPTII,S$GLB,, | Performed by: NURSE PRACTITIONER

## 2022-02-08 PROCEDURE — 1159F PR MEDICATION LIST DOCUMENTED IN MEDICAL RECORD: ICD-10-PCS | Mod: CPTII,S$GLB,, | Performed by: NURSE PRACTITIONER

## 2022-02-08 PROCEDURE — 1159F MED LIST DOCD IN RCRD: CPT | Mod: CPTII,S$GLB,, | Performed by: NURSE PRACTITIONER

## 2022-02-08 PROCEDURE — 3288F FALL RISK ASSESSMENT DOCD: CPT | Mod: CPTII,S$GLB,, | Performed by: NURSE PRACTITIONER

## 2022-02-08 PROCEDURE — 85025 COMPLETE CBC W/AUTO DIFF WBC: CPT | Performed by: NURSE PRACTITIONER

## 2022-02-08 PROCEDURE — 1101F PR PT FALLS ASSESS DOC 0-1 FALLS W/OUT INJ PAST YR: ICD-10-PCS | Mod: CPTII,S$GLB,, | Performed by: NURSE PRACTITIONER

## 2022-02-08 PROCEDURE — 99214 PR OFFICE/OUTPT VISIT, EST, LEVL IV, 30-39 MIN: ICD-10-PCS | Mod: S$GLB,,, | Performed by: NURSE PRACTITIONER

## 2022-02-08 PROCEDURE — 99214 OFFICE O/P EST MOD 30 MIN: CPT | Mod: S$GLB,,, | Performed by: NURSE PRACTITIONER

## 2022-02-08 PROCEDURE — 86235 NUCLEAR ANTIGEN ANTIBODY: CPT | Mod: 59 | Performed by: NURSE PRACTITIONER

## 2022-02-08 PROCEDURE — 86038 ANTINUCLEAR ANTIBODIES: CPT | Performed by: NURSE PRACTITIONER

## 2022-02-08 PROCEDURE — 80053 COMPREHEN METABOLIC PANEL: CPT | Performed by: NURSE PRACTITIONER

## 2022-02-08 PROCEDURE — 36415 COLL VENOUS BLD VENIPUNCTURE: CPT | Mod: PN | Performed by: NURSE PRACTITIONER

## 2022-02-08 RX ORDER — OXYBUTYNIN CHLORIDE 5 MG/1
5 TABLET ORAL DAILY
Qty: 90 TABLET | Refills: 3 | Status: SHIPPED | OUTPATIENT
Start: 2022-02-08 | End: 2022-07-21

## 2022-02-08 RX ORDER — WARFARIN SODIUM 5 MG/1
TABLET ORAL
Qty: 90 TABLET | Refills: 1 | Status: SHIPPED | OUTPATIENT
Start: 2022-02-08 | End: 2022-10-10 | Stop reason: SDUPTHER

## 2022-02-08 RX ORDER — DICLOFENAC SODIUM 10 MG/G
2 GEL TOPICAL DAILY PRN
Qty: 100 G | Refills: 2 | Status: SHIPPED | OUTPATIENT
Start: 2022-02-08 | End: 2023-11-13

## 2022-02-08 RX ORDER — GABAPENTIN 100 MG/1
CAPSULE ORAL
Qty: 630 CAPSULE | Refills: 3 | Status: SHIPPED | OUTPATIENT
Start: 2022-02-08 | End: 2023-03-13 | Stop reason: SDUPTHER

## 2022-02-08 RX ORDER — TAMSULOSIN HYDROCHLORIDE 0.4 MG/1
0.8 CAPSULE ORAL NIGHTLY
Qty: 180 CAPSULE | Refills: 3 | Status: SHIPPED | OUTPATIENT
Start: 2022-02-08 | End: 2023-03-13 | Stop reason: SDUPTHER

## 2022-02-08 NOTE — PATIENT INSTRUCTIONS
Biotene mouth wash as needed.  Can also try picking up Biotene or Act Lozenges for dry mouth.    Try sugar-free Poweraid or Gatorade as an alternative to water a few times throughout the day.

## 2022-02-09 LAB
ANA PATTERN 1: NORMAL
ANA SER QL IF: POSITIVE
ANA TITR SER IF: NORMAL {TITER}

## 2022-02-10 ENCOUNTER — TELEPHONE (OUTPATIENT)
Dept: PRIMARY CARE CLINIC | Facility: CLINIC | Age: 87
End: 2022-02-10
Payer: MEDICARE

## 2022-02-10 DIAGNOSIS — R76.8 POSITIVE ANA (ANTINUCLEAR ANTIBODY): Primary | ICD-10-CM

## 2022-02-10 DIAGNOSIS — K11.7 XEROSTOMIA: ICD-10-CM

## 2022-02-10 DIAGNOSIS — H04.123 DRY EYES: ICD-10-CM

## 2022-02-10 NOTE — TELEPHONE ENCOUNTER
----- Message from Maribel Obrien sent at 2/10/2022  2:57 PM CST -----  Contact: [phone]  Patient called, stated that he was not told anything about he will be referred to a rheumatology, that he is not ready for that yet, he was told that he was fine, and he would like a call from KOLBY Lundy. Thank you

## 2022-02-11 ENCOUNTER — TELEPHONE (OUTPATIENT)
Dept: RHEUMATOLOGY | Facility: CLINIC | Age: 87
End: 2022-02-11
Payer: MEDICARE

## 2022-02-11 ENCOUNTER — TELEPHONE (OUTPATIENT)
Dept: PRIMARY CARE CLINIC | Facility: CLINIC | Age: 87
End: 2022-02-11
Payer: MEDICARE

## 2022-02-11 LAB
ANTI SM ANTIBODY: 0.14 RATIO (ref 0–0.99)
ANTI SM/RNP ANTIBODY: 0.14 RATIO (ref 0–0.99)
ANTI-SM INTERPRETATION: NEGATIVE
ANTI-SM/RNP INTERPRETATION: NEGATIVE
ANTI-SSA ANTIBODY: 0.1 RATIO (ref 0–0.99)
ANTI-SSA INTERPRETATION: NEGATIVE
ANTI-SSB ANTIBODY: 0.1 RATIO (ref 0–0.99)
ANTI-SSB INTERPRETATION: NEGATIVE
DSDNA AB SER-ACNC: NORMAL [IU]/ML

## 2022-02-11 NOTE — TELEPHONE ENCOUNTER
Patient called, stated that he was not told anything about he will be referred to a rheumatology, that he is not ready for that yet, he was told that he was fine, and he would like a call from KOLBY Lundy. Thank you

## 2022-02-11 NOTE — TELEPHONE ENCOUNTER
----- Message from Nathalia Morgan sent at 2/11/2022 12:15 PM CST -----  Contact: Claude Patient would like a call back at [phone] in regards to his message he sent earlier.    Thanks

## 2022-02-14 ENCOUNTER — TELEPHONE (OUTPATIENT)
Dept: RHEUMATOLOGY | Facility: CLINIC | Age: 87
End: 2022-02-14
Payer: MEDICARE

## 2022-02-14 ENCOUNTER — TELEPHONE (OUTPATIENT)
Dept: PRIMARY CARE CLINIC | Facility: CLINIC | Age: 87
End: 2022-02-14
Payer: MEDICARE

## 2022-02-14 NOTE — TELEPHONE ENCOUNTER
----- Message from Dontae Lima sent at 2/14/2022  3:44 PM CST -----  Contact: self  Claude L Taylor would like a call back at [phone]. He states that he some important information to discuss.

## 2022-02-18 NOTE — TELEPHONE ENCOUNTER
Please notify Mr. Barron that I'll be out of the office until 3/2/22. The labs that we checked after his visit indicate that there may be an autoimmune issue attacking the eyes, mouth, and skin that is causing the dryness. These issues can be very difficult to formally diagnose, so I'd like to get rheumatology to evaluate him. They can also review treatment options to help with his symptoms. I'm happy to discuss with him when I come back or if he'd like to set up something with Izzy or Dr. Romo since I'm out for a bit, that would be fine also. Thanks!

## 2022-02-18 NOTE — TELEPHONE ENCOUNTER
I spoke with the pt and went over everything, pt understood and stated he would get back with the office to schedule the referral.

## 2022-02-25 ENCOUNTER — OFFICE VISIT (OUTPATIENT)
Dept: OPHTHALMOLOGY | Facility: CLINIC | Age: 87
End: 2022-02-25
Payer: MEDICARE

## 2022-02-25 DIAGNOSIS — H16.213 EXPOSURE KERATOCONJUNCTIVITIS OF BOTH EYES: ICD-10-CM

## 2022-02-25 DIAGNOSIS — M35.01 KERATITIS SICCA, BILATERAL: Primary | ICD-10-CM

## 2022-02-25 DIAGNOSIS — H40.1121 PRIMARY OPEN ANGLE GLAUCOMA (POAG) OF LEFT EYE, MILD STAGE: ICD-10-CM

## 2022-02-25 PROCEDURE — 99213 PR OFFICE/OUTPT VISIT, EST, LEVL III, 20-29 MIN: ICD-10-PCS | Mod: S$GLB,,, | Performed by: OPTOMETRIST

## 2022-02-25 PROCEDURE — 99213 OFFICE O/P EST LOW 20 MIN: CPT | Mod: S$GLB,,, | Performed by: OPTOMETRIST

## 2022-02-25 PROCEDURE — 1159F MED LIST DOCD IN RCRD: CPT | Mod: CPTII,S$GLB,, | Performed by: OPTOMETRIST

## 2022-02-25 PROCEDURE — 1159F PR MEDICATION LIST DOCUMENTED IN MEDICAL RECORD: ICD-10-PCS | Mod: CPTII,S$GLB,, | Performed by: OPTOMETRIST

## 2022-02-25 PROCEDURE — 99999 PR PBB SHADOW E&M-EST. PATIENT-LVL III: ICD-10-PCS | Mod: PBBFAC,,, | Performed by: OPTOMETRIST

## 2022-02-25 PROCEDURE — 99999 PR PBB SHADOW E&M-EST. PATIENT-LVL III: CPT | Mod: PBBFAC,,, | Performed by: OPTOMETRIST

## 2022-02-25 NOTE — PROGRESS NOTES
HPI     Last seen by ABR on 2/1/22 for Keratitis OU  Patient had punctal plug placed on 2/1/22  Patient here today for irritation OU  Patient states the eyes are tearing and are very itchy  Uses baby shampoo BID OU    PCIOL OD 6-1998 (Dr. Green)   PCIOL OS 2-1999 (Dr. Sampson)     POAG - initally diagnosed by Dr LaFluer   Next HVF March 2022   Next OCT July 2022   Next DFE with SDP March 2022    Systane PF drops qid OU   Refresh Celluvisc QHS OU   Restasis BID OU  Latanoprost QHS OS      Last edited by Margaret Maldonado, PCT on 2/25/2022 10:35 AM. (History)              Assessment /Plan     For exam results, see Encounter Report.    Keratitis sicca, bilateral  Symptoms improved after PP with Dr Herman, continue current modality with cold artificial tears as needed.     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 Next HVF March 2022.     RTC March 2022 for HVF 24-2, and DFE. Sooner if any changes to vision or worsening symptoms    Continue   Systane PF drops qid OU   Refresh Celluvisc QHS OU   Restasis BID OU  Maxitrol marisol BID OU  Latanoprost QHS OS

## 2022-03-09 ENCOUNTER — TELEPHONE (OUTPATIENT)
Dept: PEDIATRICS | Facility: CLINIC | Age: 87
End: 2022-03-09
Payer: MEDICARE

## 2022-03-09 NOTE — TELEPHONE ENCOUNTER
I spoke to the pt and gave him the number to Dr. Dario Woods to contact to schedule [phone].

## 2022-03-09 NOTE — TELEPHONE ENCOUNTER
----- Message from Kymberly Borrego sent at 3/9/2022  9:26 AM CST -----  Regarding: Call back  Contact: [phone]  Type:  Patient Call Back    Who Called:pt  What this is regarding?: who should pt see in rheumatology   Would the patient rather a call back or a response via [facility]? Call back  Best Call Back Number:[phone]  Additional Information:     Advised to call back directly if there are further questions, or if these symptoms fail to improve as anticipated or worsen.

## 2022-03-11 ENCOUNTER — OFFICE VISIT (OUTPATIENT)
Dept: OPHTHALMOLOGY | Facility: CLINIC | Age: 87
End: 2022-03-11
Payer: MEDICARE

## 2022-03-11 DIAGNOSIS — H16.213 EXPOSURE KERATOCONJUNCTIVITIS OF BOTH EYES: ICD-10-CM

## 2022-03-11 DIAGNOSIS — D31.32 CHOROIDAL NEVUS OF LEFT EYE: ICD-10-CM

## 2022-03-11 DIAGNOSIS — H40.1121 PRIMARY OPEN ANGLE GLAUCOMA (POAG) OF LEFT EYE, MILD STAGE: ICD-10-CM

## 2022-03-11 DIAGNOSIS — M35.01 KERATITIS SICCA, BILATERAL: Primary | ICD-10-CM

## 2022-03-11 PROCEDURE — 92014 COMPRE OPH EXAM EST PT 1/>: CPT | Mod: S$GLB,,, | Performed by: OPTOMETRIST

## 2022-03-11 PROCEDURE — 1159F PR MEDICATION LIST DOCUMENTED IN MEDICAL RECORD: ICD-10-PCS | Mod: CPTII,S$GLB,, | Performed by: OPTOMETRIST

## 2022-03-11 PROCEDURE — 99999 PR PBB SHADOW E&M-EST. PATIENT-LVL III: ICD-10-PCS | Mod: PBBFAC,,, | Performed by: OPTOMETRIST

## 2022-03-11 PROCEDURE — 99999 PR PBB SHADOW E&M-EST. PATIENT-LVL III: CPT | Mod: PBBFAC,,, | Performed by: OPTOMETRIST

## 2022-03-11 PROCEDURE — 1159F MED LIST DOCD IN RCRD: CPT | Mod: CPTII,S$GLB,, | Performed by: OPTOMETRIST

## 2022-03-11 PROCEDURE — 92014 PR EYE EXAM, EST PATIENT,COMPREHESV: ICD-10-PCS | Mod: S$GLB,,, | Performed by: OPTOMETRIST

## 2022-03-11 NOTE — PROGRESS NOTES
HPI     Last seen by DKT on 2/25/22   Patient here today for sore irritated eyes  No pain   Vision stable    PCIOL OD 6-1998 (Dr. Green)   PCIOL OS 2-1999 (Dr. Sampson)     POAG - initally diagnosed by Dr LaFluer   Next HVF March 2022   Next OCT July 2022   Next DFE with SDP March 2022  Punctal Plugs 2/1/22    Systane PF drops qid OU   Refresh Celluvisc QHS OU   Restasis BID OU  Latanoprost QHS OS    Last edited by Margaret Maldonado, PCT on 3/11/2022  9:20 AM. (History)              Assessment /Plan     For exam results, see Encounter Report.    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 Continue sleep mask at bedtime with Celluvisc ointment.     Primary open angle glaucoma (POAG) of left eye, mild stage  Due for HVF 24-2, patient declined HVF today.      Choroidal nevus of left eye  Stable, observe.     RTC 4 months for HVF gOCT and IOP check, sooner if any changes to vision or worsening symptoms.

## 2022-03-15 ENCOUNTER — OFFICE VISIT (OUTPATIENT)
Dept: OPHTHALMOLOGY | Facility: CLINIC | Age: 87
End: 2022-03-15
Payer: MEDICARE

## 2022-03-15 DIAGNOSIS — H10.9 BACTERIAL CONJUNCTIVITIS OF RIGHT EYE: Primary | ICD-10-CM

## 2022-03-15 PROCEDURE — 1126F PR PAIN SEVERITY QUANTIFIED, NO PAIN PRESENT: ICD-10-PCS | Mod: CPTII,S$GLB,, | Performed by: OPTOMETRIST

## 2022-03-15 PROCEDURE — 99999 PR PBB SHADOW E&M-EST. PATIENT-LVL III: ICD-10-PCS | Mod: PBBFAC,,, | Performed by: OPTOMETRIST

## 2022-03-15 PROCEDURE — 1159F MED LIST DOCD IN RCRD: CPT | Mod: CPTII,S$GLB,, | Performed by: OPTOMETRIST

## 2022-03-15 PROCEDURE — 99213 PR OFFICE/OUTPT VISIT, EST, LEVL III, 20-29 MIN: ICD-10-PCS | Mod: S$GLB,,, | Performed by: OPTOMETRIST

## 2022-03-15 PROCEDURE — 1159F PR MEDICATION LIST DOCUMENTED IN MEDICAL RECORD: ICD-10-PCS | Mod: CPTII,S$GLB,, | Performed by: OPTOMETRIST

## 2022-03-15 PROCEDURE — 99999 PR PBB SHADOW E&M-EST. PATIENT-LVL III: CPT | Mod: PBBFAC,,, | Performed by: OPTOMETRIST

## 2022-03-15 PROCEDURE — 99213 OFFICE O/P EST LOW 20 MIN: CPT | Mod: S$GLB,,, | Performed by: OPTOMETRIST

## 2022-03-15 PROCEDURE — 1126F AMNT PAIN NOTED NONE PRSNT: CPT | Mod: CPTII,S$GLB,, | Performed by: OPTOMETRIST

## 2022-03-15 RX ORDER — MOXIFLOXACIN 5 MG/ML
1 SOLUTION/ DROPS OPHTHALMIC
Qty: 3 ML | Refills: 0 | Status: SHIPPED | OUTPATIENT
Start: 2022-03-15 | End: 2022-03-16 | Stop reason: SDUPTHER

## 2022-03-15 NOTE — PROGRESS NOTES
HPI     Patient here for eye problem   Laterality: OD  Pain Scale:  Discomfort  Onset:  Sunday (2 days prior)  Discharge:   Yes  A.M. Matting:  Yes  Itch:   No  Redness:   Yes  Photophobia:   Yes  Foreign body sensation:   Yes  Deep pain:   No  Previous occurrence:   2 years ago  Drops:   No  Contact lens wear? No    Pt used Erythromycin last night before bed       Last edited by Sajan Brown, OD on 3/15/2022  9:54 AM. (History)            Assessment /Plan     For exam results, see Encounter Report.    Bacterial conjunctivitis of right eye  -     moxifloxacin (VIGAMOX) 0.5 % ophthalmic solution; Place 1 drop into the right eye every hour while awake. for 7 days  Dispense: 3 mL; Refill: 0    Start moxi q1h while awake, no corneal involvement and VA improved after removing excessive mucous on eye. RTC 1 day for follow up consider punctal plug removal in OD.

## 2022-03-16 ENCOUNTER — OFFICE VISIT (OUTPATIENT)
Dept: OPHTHALMOLOGY | Facility: CLINIC | Age: 87
End: 2022-03-16
Payer: MEDICARE

## 2022-03-16 DIAGNOSIS — H10.9 BACTERIAL CONJUNCTIVITIS OF RIGHT EYE: ICD-10-CM

## 2022-03-16 PROCEDURE — 99999 PR PBB SHADOW E&M-EST. PATIENT-LVL III: ICD-10-PCS | Mod: PBBFAC,,, | Performed by: OPTOMETRIST

## 2022-03-16 PROCEDURE — 99213 OFFICE O/P EST LOW 20 MIN: CPT | Mod: S$GLB,,, | Performed by: OPTOMETRIST

## 2022-03-16 PROCEDURE — 1159F PR MEDICATION LIST DOCUMENTED IN MEDICAL RECORD: ICD-10-PCS | Mod: CPTII,S$GLB,, | Performed by: OPTOMETRIST

## 2022-03-16 PROCEDURE — 1159F MED LIST DOCD IN RCRD: CPT | Mod: CPTII,S$GLB,, | Performed by: OPTOMETRIST

## 2022-03-16 PROCEDURE — 99213 PR OFFICE/OUTPT VISIT, EST, LEVL III, 20-29 MIN: ICD-10-PCS | Mod: S$GLB,,, | Performed by: OPTOMETRIST

## 2022-03-16 PROCEDURE — 99999 PR PBB SHADOW E&M-EST. PATIENT-LVL III: CPT | Mod: PBBFAC,,, | Performed by: OPTOMETRIST

## 2022-03-16 RX ORDER — MOXIFLOXACIN 5 MG/ML
1 SOLUTION/ DROPS OPHTHALMIC
Qty: 6 ML | Refills: 0 | Status: SHIPPED | OUTPATIENT
Start: 2022-03-16 | End: 2022-03-25

## 2022-03-16 NOTE — PROGRESS NOTES
HPI     Last seen by DKT on 3/15/22 for bacterial conjunctivitis OD  Patient here today for 1 day follow up  Patient using Vigamox q1h when awake OD  States OD is blurred due to ointment  C/O mucus itchy red irritated OD    PCIOL OD 6-1998 (Dr. Green)   PCIOL OS 2-1999 (Dr. Sampson)     POAG - initally diagnosed by Dr LaFluer   Next HVF July 2022  Next OCT July 2022   Next DFE March 2023    Systane PF drops qid OU   Refresh Celluvisc QHS OU   Restasis BID OU  Latanoprost QHS OS      Last edited by Margaret Maldonado, PCT on 3/16/2022  8:36 AM. (History)              Assessment /Plan     For exam results, see Encounter Report.    Bacterial conjunctivitis of right eye  -     moxifloxacin (VIGAMOX) 0.5 % ophthalmic solution; Place 1 drop into the right eye every hour while awake. for 7 days  Dispense: 3 mL; Refill: 0      Appears stable with no corneal involvement. Continue moxifloxacin 1 drop every hour while awake. Punctal plug removed today behind slit lamp in right eye.     RTC 1 day for f/u sooner if any changes to vision or worsening symptoms

## 2022-03-17 ENCOUNTER — OFFICE VISIT (OUTPATIENT)
Dept: OPHTHALMOLOGY | Facility: CLINIC | Age: 87
End: 2022-03-17
Payer: MEDICARE

## 2022-03-17 DIAGNOSIS — H10.9 BACTERIAL CONJUNCTIVITIS OF RIGHT EYE: Primary | ICD-10-CM

## 2022-03-17 DIAGNOSIS — H02.889 MEIBOMIAN GLAND DYSFUNCTION: ICD-10-CM

## 2022-03-17 PROCEDURE — 92014 COMPRE OPH EXAM EST PT 1/>: CPT | Mod: S$GLB,,, | Performed by: OPTOMETRIST

## 2022-03-17 PROCEDURE — 99999 PR PBB SHADOW E&M-EST. PATIENT-LVL III: ICD-10-PCS | Mod: PBBFAC,,, | Performed by: OPTOMETRIST

## 2022-03-17 PROCEDURE — 92014 PR EYE EXAM, EST PATIENT,COMPREHESV: ICD-10-PCS | Mod: S$GLB,,, | Performed by: OPTOMETRIST

## 2022-03-17 PROCEDURE — 1159F PR MEDICATION LIST DOCUMENTED IN MEDICAL RECORD: ICD-10-PCS | Mod: CPTII,S$GLB,, | Performed by: OPTOMETRIST

## 2022-03-17 PROCEDURE — 1159F MED LIST DOCD IN RCRD: CPT | Mod: CPTII,S$GLB,, | Performed by: OPTOMETRIST

## 2022-03-17 PROCEDURE — 99999 PR PBB SHADOW E&M-EST. PATIENT-LVL III: CPT | Mod: PBBFAC,,, | Performed by: OPTOMETRIST

## 2022-03-17 RX ORDER — MOXIFLOXACIN 5 MG/ML
1 SOLUTION/ DROPS OPHTHALMIC
Qty: 3 ML | Refills: 2 | Status: SHIPPED | OUTPATIENT
Start: 2022-03-17 | End: 2022-03-24

## 2022-03-17 NOTE — PROGRESS NOTES
HPI     Last seen by DKT  Patient here today for 1 day follow up   Patient using Vigamox Q1H when awake  Patient states vision is still blurred but much better than yesterday  No pain OD mild pain OS      PCIOL OD 6-1998 (Dr. Green)   PCIOL OS 2-1999 (Dr. Sampson)     POAG - initally diagnosed by Dr LaFluer   Next HVF July 2022  Next OCT July 2022   Next DFE March 2023    Systane PF drops qid OU   Refresh Celluvisc QHS OU   Restasis BID OU  Latanoprost QHS OS      Last edited by Margaret Maldonado, PCT on 3/17/2022  8:40 AM. (History)              Assessment /Plan     For exam results, see Encounter Report.    Bacterial conjunctivitis of right eye  -     moxifloxacin (VIGAMOX) 0.5 % ophthalmic solution; Place 1 drop into the right eye 5 (five) times daily. for 7 days  Dispense: 3 mL; Refill: 2    VA improving back baseline. No corneal involvement Continue Vigamox 5x daily, RTC 1 day for recheck before weekend sooner if any changes to vision or worsening symptoms.     Meibomian gland dysfunction  Warm compress lid hygiene reviewed continue     Systane PF drops qid OU   Refresh Celluvisc QHS OU   Restasis BID OU  Latanoprost QHS OS  Vigamox 5x daily both eyes.

## 2022-03-18 ENCOUNTER — OFFICE VISIT (OUTPATIENT)
Dept: OPHTHALMOLOGY | Facility: CLINIC | Age: 87
End: 2022-03-18
Payer: MEDICARE

## 2022-03-18 DIAGNOSIS — H02.886 MEIBOMIAN GLAND DYSFUNCTION (MGD) OF BOTH EYES: ICD-10-CM

## 2022-03-18 DIAGNOSIS — H10.9 BACTERIAL CONJUNCTIVITIS OF RIGHT EYE: Primary | ICD-10-CM

## 2022-03-18 DIAGNOSIS — H02.883 MEIBOMIAN GLAND DYSFUNCTION (MGD) OF BOTH EYES: ICD-10-CM

## 2022-03-18 PROCEDURE — 1159F MED LIST DOCD IN RCRD: CPT | Mod: CPTII,S$GLB,, | Performed by: OPTOMETRIST

## 2022-03-18 PROCEDURE — 1159F PR MEDICATION LIST DOCUMENTED IN MEDICAL RECORD: ICD-10-PCS | Mod: CPTII,S$GLB,, | Performed by: OPTOMETRIST

## 2022-03-18 PROCEDURE — 99213 PR OFFICE/OUTPT VISIT, EST, LEVL III, 20-29 MIN: ICD-10-PCS | Mod: S$GLB,,, | Performed by: OPTOMETRIST

## 2022-03-18 PROCEDURE — 99213 OFFICE O/P EST LOW 20 MIN: CPT | Mod: S$GLB,,, | Performed by: OPTOMETRIST

## 2022-03-18 PROCEDURE — 99999 PR PBB SHADOW E&M-EST. PATIENT-LVL III: ICD-10-PCS | Mod: PBBFAC,,, | Performed by: OPTOMETRIST

## 2022-03-18 PROCEDURE — 99999 PR PBB SHADOW E&M-EST. PATIENT-LVL III: CPT | Mod: PBBFAC,,, | Performed by: OPTOMETRIST

## 2022-03-18 NOTE — PROGRESS NOTES
HPI     PAtient here today for 1 day follow up  Last seen by DKT on 3/17/22  Patient using Vigamox 5x daily OU  C/O blurred vision, some redness    PCIOL OD 6-1998 (Dr. Green)   PCIOL OS 2-1999 (Dr. Sampson)     POAG - initally diagnosed by Dr LaFluer   Next HVF July 2022  Next OCT July 2022   Next DFE March 2023    Systane PF drops qid OU   Refresh Celluvisc QHS OU   Restasis BID OU  Latanoprost QHS OS        Last edited by Margaret Maldonado, PCT on 3/18/2022  9:15 AM. (History)              Assessment /Plan     For exam results, see Encounter Report.      Bacterial conjunctivitis of right eye    VA improving back baseline. No corneal involvement Continue Vigamox 5x daily in right eye and prophylactic vigamox tid in left eye. RTC 7 days for f/u sooner if any changes to vision or worsening symptoms.      Meibomian gland dysfunction  Warm compress lid hygiene reviewed consider chronic low dose doxy for better management. continue      Systane PF drops qid OU   Refresh Celluvisc QHS OU   Restasis BID OU  Latanoprost QHS OS  Vigamox 5x daily OD, TID OS.

## 2022-03-25 ENCOUNTER — OFFICE VISIT (OUTPATIENT)
Dept: OPHTHALMOLOGY | Facility: CLINIC | Age: 87
End: 2022-03-25
Payer: MEDICARE

## 2022-03-25 DIAGNOSIS — H40.1121 PRIMARY OPEN ANGLE GLAUCOMA (POAG) OF LEFT EYE, MILD STAGE: ICD-10-CM

## 2022-03-25 DIAGNOSIS — H10.9 BACTERIAL CONJUNCTIVITIS OF RIGHT EYE: ICD-10-CM

## 2022-03-25 DIAGNOSIS — H02.886 MEIBOMIAN GLAND DYSFUNCTION (MGD) OF BOTH EYES: Primary | ICD-10-CM

## 2022-03-25 DIAGNOSIS — H02.883 MEIBOMIAN GLAND DYSFUNCTION (MGD) OF BOTH EYES: Primary | ICD-10-CM

## 2022-03-25 PROCEDURE — 92012 INTRM OPH EXAM EST PATIENT: CPT | Mod: S$GLB,,, | Performed by: OPTOMETRIST

## 2022-03-25 PROCEDURE — 1159F MED LIST DOCD IN RCRD: CPT | Mod: CPTII,S$GLB,, | Performed by: OPTOMETRIST

## 2022-03-25 PROCEDURE — 92012 PR EYE EXAM, EST PATIENT,INTERMED: ICD-10-PCS | Mod: S$GLB,,, | Performed by: OPTOMETRIST

## 2022-03-25 PROCEDURE — 99999 PR PBB SHADOW E&M-EST. PATIENT-LVL III: ICD-10-PCS | Mod: PBBFAC,,, | Performed by: OPTOMETRIST

## 2022-03-25 PROCEDURE — 99999 PR PBB SHADOW E&M-EST. PATIENT-LVL III: CPT | Mod: PBBFAC,,, | Performed by: OPTOMETRIST

## 2022-03-25 PROCEDURE — 1159F PR MEDICATION LIST DOCUMENTED IN MEDICAL RECORD: ICD-10-PCS | Mod: CPTII,S$GLB,, | Performed by: OPTOMETRIST

## 2022-03-25 NOTE — PROGRESS NOTES
HPI     Last seen by DKT  Patient here today for 7 day follow up   Eyes aren't any better      PCIOL OD 6-1998 (Dr. Green)   PCIOL OS 2-1999 (Dr. Sampson)     POAG - initally diagnosed by Dr LaFluer   Next HVF July 2022  Next OCT July 2022   Next DFE March 2023    Systane PF drops qid OU   Refresh Celluvisc QHS OU   Restasis BID OU  Latanoprost QHS OS  Vigamox 5x daily OD, TID OS    Last edited by Margaret Maldonado, PCT on 3/25/2022  8:54 AM. (History)              Assessment /Plan     For exam results, see Encounter Report.    Meibomian gland dysfunction (MGD) of both eyes  With severe dry eye symptoms. Recommend Systane PF drops and refresh celluvisc QID OU with restasis BID.   Bacterial conjunctivitis of right eye  Resolved stop vigamox. Lid hygiene reviewed.    Primary open angle glaucoma (POAG) of left eye, mild stage  Patient overdue for HVF, deferred HVF testing today. RTC 1 month for HVF 24-2     RTC 1 month for IOP check with HVF 24-2 sooner if any changes to vision or worsening symptoms.

## 2022-03-27 NOTE — PROGRESS NOTES
Disclaimer:  This note is prepared using voice recognition software and as such is likely to have errors and has not been proof read. Please contact me for questions.    Subjective:      Patient ID: Claude L Taylor is a 89 y.o. male.    Chief Complaint: Follow-up    Mr. Barron presents to clinic today for a routine follow-up on his chronic conditions.  Overall he is doing about the same.  Has some chronic body pains.  Did see some good improvement with use of magnesium oil spray.  Still having a lot of foot pain.  Also he reports that he has been seeing Ophthalmology for severe dry eyes and he has been having a lot of dry mouth but seems to be worse than his baseline.        Follow-up  Associated symptoms include arthralgias ( bilateral foot pain, bilateral knee pain), fatigue, myalgias, neck pain and weakness. Pertinent negatives include no abdominal pain, chest pain, congestion, fever, headaches or numbness.       Review of Systems   Constitutional: Positive for activity change (Only with exertion) and fatigue. Negative for fever and unexpected weight change.   HENT: Negative.  Negative for congestion.    Respiratory: Positive for shortness of breath. Negative for chest tightness and wheezing.    Cardiovascular: Negative.  Negative for chest pain, palpitations and leg swelling.   Gastrointestinal: Negative.  Negative for abdominal distention and abdominal pain.   Genitourinary: Negative.    Musculoskeletal: Positive for arthralgias ( bilateral foot pain, bilateral knee pain), back pain, myalgias, neck pain and neck stiffness.   Skin: Negative.    Neurological: Positive for weakness. Negative for dizziness, light-headedness, numbness and headaches.   Hematological: Negative.    Psychiatric/Behavioral: Positive for sleep disturbance. Negative for decreased concentration and dysphoric mood. The patient is not nervous/anxious.        Objective:   /60   Pulse 61   Temp 97.6 °F (36.4 °C) (Temporal)   Resp 18    Ht 6' (1.829 m)   Wt 78.8 kg (173 lb 9.8 oz)   SpO2 99%   BMI 23.55 kg/m²   Physical Exam  Vitals and nursing note reviewed.   Constitutional:       Appearance: Normal appearance. He is well-developed and normal weight.   HENT:      Head: Normocephalic and atraumatic.      Right Ear: Tympanic membrane and external ear normal.      Left Ear: Tympanic membrane and external ear normal.      Nose: Nose normal.   Eyes:      Conjunctiva/sclera: Conjunctivae normal.   Cardiovascular:      Rate and Rhythm: Normal rate. Rhythm irregular.   Pulmonary:      Effort: Pulmonary effort is normal.      Breath sounds: Normal breath sounds.   Musculoskeletal:      Cervical back: Normal range of motion and neck supple.      Lumbar back: Tenderness and bony tenderness present. No spasms. Decreased range of motion.      Right knee: Decreased range of motion. Tenderness present over the medial joint line.      Left knee: Decreased range of motion. Tenderness present over the medial joint line.   Skin:     Findings: No erythema or lesion.   Neurological:      General: No focal deficit present.      Mental Status: He is alert. Mental status is at baseline.      Gait: Gait abnormal.   Psychiatric:         Mood and Affect: Mood normal.         Speech: Speech normal.         Behavior: Behavior normal.         Thought Content: Thought content normal.         Judgment: Judgment normal.       Assessment:     1. Thrombocytopenia, unspecified    2. Spinal stenosis of lumbar region at multiple levels    3. Fatigue, unspecified type    4. Atrial fibrillation, chronic    5. Shortness of breath    6. Foot pain, bilateral    7. Dry eyes    8. Xerostomia       Plan:   Thrombocytopenia, unspecified    Spinal stenosis of lumbar region at multiple levels  Comments:  Not controlled adjusting gabapentin 200 mg in the a.m. 200mg at lunch increase to 300 mg at night.  Orders:  -     gabapentin (NEURONTIN) 100 MG capsule; Take 2 capsules in the morning, 2  capsules at  mid-day, and 3 capsule by mouth at bedtime for neuropathy (Patient taking differently: Take 2 capsules in the morning, 2 capsules at  mid-day, and 3 capsule by mouth at bedtime for neuropathy)  Dispense: 630 capsule; Refill: 3    Fatigue, unspecified type  -     CBC Auto Differential; Future; Expected date: 02/08/2022  -     Comprehensive Metabolic Panel; Future; Expected date: 02/08/2022    Atrial fibrillation, chronic  -     warfarin (COUMADIN) 5 MG tablet; Take 1 tablet by mouth every day or as directed by coumadin clinic  Dispense: 90 tablet; Refill: 1  -     CBC Auto Differential; Future; Expected date: 02/08/2022  -     Comprehensive Metabolic Panel; Future; Expected date: 02/08/2022    Shortness of breath    Foot pain, bilateral  -     diclofenac sodium (VOLTAREN) 1 % Gel; Apply 2 g topically daily as needed (foot pain).  Dispense: 100 g; Refill: 2    Dry eyes    Xerostomia  -     SHOAIB Screen w/Reflex; Future; Expected date: 02/08/2022    Other orders  -     tamsulosin (FLOMAX) 0.4 mg Cap; Take 2 capsules (0.8 mg total) by mouth every evening. For prostate and urine flow  Dispense: 180 capsule; Refill: 3  -     oxybutynin (DITROPAN) 5 MG Tab; Take 1 tablet (5 mg total) by mouth once daily. For overactive bladder  Dispense: 90 tablet; Refill: 3    Refilled requested medications.  Will check SHOAIB due to complaints of dry eye and dry mouth, possible Sjogren syndrome.  Try adding in diclofenac gel for the foot pain to see if that helps some.  Up-to-date on all other health maintenance issues at this time.  Follow-up in about 3 months for chronic conditions.    Time spent: 40 minutes in face to face discussion concerning diagnosis, prognosis, review of lab and test results, benefits of treatment as well as management of disease, counseling of patient and coordination of care between various health care providers . Greater than half the time spent was used for coordination of care and counseling of  patient.       No follow-ups on file.    Patient Instructions   Biotene mouth wash as needed.  Can also try picking up Biotene or Act Lozenges for dry mouth.    Try sugar-free Poweraid or Gatorade as an alternative to water a few times throughout the day.

## 2022-04-05 ENCOUNTER — OFFICE VISIT (OUTPATIENT)
Dept: OPHTHALMOLOGY | Facility: CLINIC | Age: 87
End: 2022-04-05
Payer: MEDICARE

## 2022-04-05 ENCOUNTER — TELEPHONE (OUTPATIENT)
Dept: ADMINISTRATIVE | Facility: HOSPITAL | Age: 87
End: 2022-04-05
Payer: MEDICARE

## 2022-04-05 DIAGNOSIS — H02.883 MEIBOMIAN GLAND DYSFUNCTION (MGD) OF BOTH EYES: ICD-10-CM

## 2022-04-05 DIAGNOSIS — H01.024 SQUAMOUS BLEPHARITIS OF UPPER EYELIDS OF BOTH EYES: Primary | ICD-10-CM

## 2022-04-05 DIAGNOSIS — H01.021 SQUAMOUS BLEPHARITIS OF UPPER EYELIDS OF BOTH EYES: Primary | ICD-10-CM

## 2022-04-05 DIAGNOSIS — H02.886 MEIBOMIAN GLAND DYSFUNCTION (MGD) OF BOTH EYES: ICD-10-CM

## 2022-04-05 DIAGNOSIS — H40.1121 PRIMARY OPEN ANGLE GLAUCOMA (POAG) OF LEFT EYE, MILD STAGE: ICD-10-CM

## 2022-04-05 PROCEDURE — 1159F PR MEDICATION LIST DOCUMENTED IN MEDICAL RECORD: ICD-10-PCS | Mod: CPTII,S$GLB,, | Performed by: OPTOMETRIST

## 2022-04-05 PROCEDURE — 99999 PR PBB SHADOW E&M-EST. PATIENT-LVL III: CPT | Mod: PBBFAC,,, | Performed by: OPTOMETRIST

## 2022-04-05 PROCEDURE — 99999 PR PBB SHADOW E&M-EST. PATIENT-LVL III: ICD-10-PCS | Mod: PBBFAC,,, | Performed by: OPTOMETRIST

## 2022-04-05 PROCEDURE — 99213 OFFICE O/P EST LOW 20 MIN: CPT | Mod: S$GLB,,, | Performed by: OPTOMETRIST

## 2022-04-05 PROCEDURE — 1159F MED LIST DOCD IN RCRD: CPT | Mod: CPTII,S$GLB,, | Performed by: OPTOMETRIST

## 2022-04-05 PROCEDURE — 99213 PR OFFICE/OUTPT VISIT, EST, LEVL III, 20-29 MIN: ICD-10-PCS | Mod: S$GLB,,, | Performed by: OPTOMETRIST

## 2022-04-05 RX ORDER — NEOMYCIN SULFATE, POLYMYXIN B SULFATE AND DEXAMETHASONE 3.5; 10000; 1 MG/ML; [USP'U]/ML; MG/ML
1 SUSPENSION/ DROPS OPHTHALMIC 4 TIMES DAILY
Qty: 5 ML | Refills: 0 | Status: SHIPPED | OUTPATIENT
Start: 2022-04-05 | End: 2022-04-16

## 2022-04-05 NOTE — PROGRESS NOTES
HPI     Last seen by DKT   Patient here today for irritation OU  Patient states irritation started up this week sensitive to light c/o puss   coming out of eyes OS more than OD with FB sensation  Using antibiotic drop Moxifloxacin   PCIOL OD 6-1998 (Dr. Green)   PCIOL OS 2-1999 (Dr. Sampson)     POAG   Dry eyes   Systane PF drops qid OU   Refresh Celluvisc QID OU   Restasis BID OU  Latanoprost QHS OS    Last edited by Margaret Maldonado, PCT on 4/5/2022  1:14 PM. (History)            Assessment /Plan     For exam results, see Encounter Report.    Squamous blepharitis of upper eyelids of both eyes  -     neomycin-polymyxin-dexamethasone (MAXITROL) 3.5mg/mL-10,000 unit/mL-0.1 % DrpS; Place 1 drop into both eyes 4 (four) times daily. for 10 days  Dispense: 5 mL; Refill: 0  Patient has chronic MGD with severe dry eye symptoms. Failed punctal plugs due to frequent infection. Continue warm compresses with lid hygiene.   Start Maxitrol drops tid x 10 days  Continue Systane PF drops, Refresh celluvisc QID and Restasis BID.    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     RTC 2 weeks for dry eye f/u with HVF 24-2.

## 2022-04-19 ENCOUNTER — OFFICE VISIT (OUTPATIENT)
Dept: OPHTHALMOLOGY | Facility: CLINIC | Age: 87
End: 2022-04-19
Payer: MEDICARE

## 2022-04-19 DIAGNOSIS — H02.886 MEIBOMIAN GLAND DYSFUNCTION (MGD) OF BOTH EYES: Primary | ICD-10-CM

## 2022-04-19 DIAGNOSIS — H10.9 BACTERIAL CONJUNCTIVITIS OF BOTH EYES: ICD-10-CM

## 2022-04-19 DIAGNOSIS — H40.1121 PRIMARY OPEN ANGLE GLAUCOMA (POAG) OF LEFT EYE, MILD STAGE: ICD-10-CM

## 2022-04-19 DIAGNOSIS — H02.883 MEIBOMIAN GLAND DYSFUNCTION (MGD) OF BOTH EYES: Primary | ICD-10-CM

## 2022-04-19 DIAGNOSIS — B96.89 BACTERIAL CONJUNCTIVITIS OF BOTH EYES: ICD-10-CM

## 2022-04-19 PROCEDURE — 1125F AMNT PAIN NOTED PAIN PRSNT: CPT | Mod: CPTII,S$GLB,, | Performed by: OPTOMETRIST

## 2022-04-19 PROCEDURE — 99999 PR PBB SHADOW E&M-EST. PATIENT-LVL III: ICD-10-PCS | Mod: PBBFAC,,, | Performed by: OPTOMETRIST

## 2022-04-19 PROCEDURE — 99999 PR PBB SHADOW E&M-EST. PATIENT-LVL III: CPT | Mod: PBBFAC,,, | Performed by: OPTOMETRIST

## 2022-04-19 PROCEDURE — 1125F PR PAIN SEVERITY QUANTIFIED, PAIN PRESENT: ICD-10-PCS | Mod: CPTII,S$GLB,, | Performed by: OPTOMETRIST

## 2022-04-19 PROCEDURE — 99213 PR OFFICE/OUTPT VISIT, EST, LEVL III, 20-29 MIN: ICD-10-PCS | Mod: S$GLB,,, | Performed by: OPTOMETRIST

## 2022-04-19 PROCEDURE — 1159F MED LIST DOCD IN RCRD: CPT | Mod: CPTII,S$GLB,, | Performed by: OPTOMETRIST

## 2022-04-19 PROCEDURE — 1159F PR MEDICATION LIST DOCUMENTED IN MEDICAL RECORD: ICD-10-PCS | Mod: CPTII,S$GLB,, | Performed by: OPTOMETRIST

## 2022-04-19 PROCEDURE — 99213 OFFICE O/P EST LOW 20 MIN: CPT | Mod: S$GLB,,, | Performed by: OPTOMETRIST

## 2022-04-19 RX ORDER — TOBRAMYCIN AND DEXAMETHASONE 3; 1 MG/ML; MG/ML
1 SUSPENSION/ DROPS OPHTHALMIC 4 TIMES DAILY
Qty: 5 ML | Refills: 0 | Status: SHIPPED | OUTPATIENT
Start: 2022-04-19 | End: 2022-05-01

## 2022-04-19 NOTE — PROGRESS NOTES
HPI     Patient here for OD problem     Laterality: OD  Pain Scale:  3  Onset:   2 weeks   Discharge:   Yes  A.M. Matting:  Yes  Itch:   No  Redness:   Yes  Photophobia:   Yes  Foreign body sensation:   No  Deep pain:   No  Previous occurrence:   On and off   Drops:   Latanoprost at bedtime, refresh celluvisc tid, systane   preservative free qid, warm compresses bid, restasis bid   Contact lens wear? No        Last edited by Sajan Brown, OD on 4/19/2022  1:28 PM. (History)            Assessment /Plan     For exam results, see Encounter Report.    Meibomian gland dysfunction (MGD) of both eyes  Severe dry eyes causing intermittent blurry vision and pain. Continue warm compresses with lid hygiene bid OU, Refresh celluvsic QID. Patient has appointment with rheumatology for w/u for possible Sjorgrens  Bacterial conjunctivitis of both eyes  -     tobramycin-dexamethasone 0.3-0.1% (TOBRADEX) 0.3-0.1 % DrpS; Place 1 drop into both eyes 4 (four) times daily. for 7 days  Dispense: 5 mL; Refill: 0  Start tobradex qid x 7 days.   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 Patient overdue for HVF 24-2, RTC for 24-2 next available.     Refresh Celluvisc QID OU   Restasis BID OU  Latanoprost QHS OS  RTC 7 days for f/u sooner if any changes to vision or worsening symptoms.

## 2022-04-19 NOTE — PATIENT INSTRUCTIONS
Latanoprost at bedtime in left eye  2.   Refresh Celluvsic and systane tears 4 times daily in both eyes.  3.  Warm compresses twice per day   4.  Restasis 2 times daily  5.  Tobradex 4 times daily in both eyes for 1 week.

## 2022-04-26 ENCOUNTER — OFFICE VISIT (OUTPATIENT)
Dept: OPHTHALMOLOGY | Facility: CLINIC | Age: 87
End: 2022-04-26
Payer: MEDICARE

## 2022-04-26 DIAGNOSIS — H10.9 BACTERIAL CONJUNCTIVITIS OF BOTH EYES: ICD-10-CM

## 2022-04-26 DIAGNOSIS — H02.886 MEIBOMIAN GLAND DYSFUNCTION (MGD) OF BOTH EYES: Primary | ICD-10-CM

## 2022-04-26 DIAGNOSIS — M35.01 KERATOCONJUNCTIVITIS SICCA: ICD-10-CM

## 2022-04-26 DIAGNOSIS — B96.89 BACTERIAL CONJUNCTIVITIS OF BOTH EYES: ICD-10-CM

## 2022-04-26 DIAGNOSIS — H40.1121 PRIMARY OPEN ANGLE GLAUCOMA (POAG) OF LEFT EYE, MILD STAGE: ICD-10-CM

## 2022-04-26 DIAGNOSIS — H02.883 MEIBOMIAN GLAND DYSFUNCTION (MGD) OF BOTH EYES: Primary | ICD-10-CM

## 2022-04-26 PROCEDURE — 99213 OFFICE O/P EST LOW 20 MIN: CPT | Mod: S$GLB,,, | Performed by: OPTOMETRIST

## 2022-04-26 PROCEDURE — 1159F MED LIST DOCD IN RCRD: CPT | Mod: CPTII,S$GLB,, | Performed by: OPTOMETRIST

## 2022-04-26 PROCEDURE — 1159F PR MEDICATION LIST DOCUMENTED IN MEDICAL RECORD: ICD-10-PCS | Mod: CPTII,S$GLB,, | Performed by: OPTOMETRIST

## 2022-04-26 PROCEDURE — 99999 PR PBB SHADOW E&M-EST. PATIENT-LVL III: ICD-10-PCS | Mod: PBBFAC,,, | Performed by: OPTOMETRIST

## 2022-04-26 PROCEDURE — 99213 PR OFFICE/OUTPT VISIT, EST, LEVL III, 20-29 MIN: ICD-10-PCS | Mod: S$GLB,,, | Performed by: OPTOMETRIST

## 2022-04-26 PROCEDURE — 99999 PR PBB SHADOW E&M-EST. PATIENT-LVL III: CPT | Mod: PBBFAC,,, | Performed by: OPTOMETRIST

## 2022-04-26 NOTE — Clinical Note
Mr Barron is a pleasant 89 year old male who suffers from chronic dryness and irritation. I have tried multiple treatment approaches for his dry eye yet he continues to remain symptomatic and returns to my clinic. I'd like to get him in for a second opinion/consult with Dr Sampson as he has had such a great experience with Dr Sampson in the past. Please let me know if you have any questions.   Thanks so much, DT

## 2022-04-27 NOTE — PROGRESS NOTES
HPI     Last seen by DKT on 4/19/22  PAtient here today for 7 day follow up  Patient c/o pain and glare OU associated with blurred vision  Using drops as directed    PCIOL OD 6-1998 (Dr. Green)   PCIOL OS 2-1999 (Dr. Sampson)     POAG   Dry eyes   Refresh Celluvisc QID OU   Restasis BID OU  Latanoprost QHS OS  Tobradex QID OU x1 week    Last edited by Sajan Brown, OD on 4/26/2022  1:13 PM. (History)            Assessment /Plan     For exam results, see Encounter Report.    Meibomian gland dysfunction (MGD) of both eyes  Exhaustive list of treatment tried but patient continue to remain symptomatic. Dryness causes chronic bacterial conjunctivitis and irritation. Patient is currently scheduled to f/u with Rheumatology for possible Sjorgren's. Failed punctal plugs in the past. Consider low dose doxy. Continue for now:   Warm compress bid  Refresh celluvisc QID  Restasis BID  Will consult Dr Sampson for further evaluation.     Keratoconjunctivitis sicca  See above    Bacterial conjunctivitis of both eyes  Mostly resolved, continue Tobradex qid x 3 more days then stop.     Primary open angle glaucoma (POAG) of left eye, mild stage  IOP at target pressure, patient overdue for HVF 24-2. Stressed importance of testing. Patient deferred today due to fatigue. Continue latanoprost at bedtime. RTC next available for HVF 24-2.          Continue:  Warm compress BID OU   Refresh celluvisc QID OU   Restasis BID OU   Latanoprost QHS OS      RTC with Dr LOBO for dry eye consult and POAG f/u sooner if any changes to vision or worsening symptoms.

## 2022-04-29 ENCOUNTER — TELEPHONE (OUTPATIENT)
Dept: RHEUMATOLOGY | Facility: CLINIC | Age: 87
End: 2022-04-29
Payer: MEDICARE

## 2022-04-29 ENCOUNTER — TELEPHONE (OUTPATIENT)
Dept: OPHTHALMOLOGY | Facility: CLINIC | Age: 87
End: 2022-04-29
Payer: MEDICARE

## 2022-04-29 NOTE — TELEPHONE ENCOUNTER
SPOKE TO PT AND BOOKED HIS APPT, HE COULD NOT HEAR ME ON THE PHONE AND I TOLD HIM THE MY CHART WOULD ALERT HIM OF THIS APPT

## 2022-05-02 ENCOUNTER — OFFICE VISIT (OUTPATIENT)
Dept: RHEUMATOLOGY | Facility: CLINIC | Age: 87
End: 2022-05-02
Payer: MEDICARE

## 2022-05-02 VITALS
HEIGHT: 72 IN | DIASTOLIC BLOOD PRESSURE: 70 MMHG | WEIGHT: 169.56 LBS | HEART RATE: 60 BPM | SYSTOLIC BLOOD PRESSURE: 142 MMHG | BODY MASS INDEX: 22.97 KG/M2

## 2022-05-02 DIAGNOSIS — R76.8 POSITIVE ANA (ANTINUCLEAR ANTIBODY): Primary | ICD-10-CM

## 2022-05-02 DIAGNOSIS — M15.9 PRIMARY OSTEOARTHRITIS INVOLVING MULTIPLE JOINTS: ICD-10-CM

## 2022-05-02 DIAGNOSIS — H16.223 KERATOCONJUNCTIVITIS SICCA DUE TO DECREASED TEAR PRODUCTION, BILATERAL: ICD-10-CM

## 2022-05-02 PROBLEM — M15.0 PRIMARY OSTEOARTHRITIS INVOLVING MULTIPLE JOINTS: Status: ACTIVE | Noted: 2022-05-02

## 2022-05-02 PROCEDURE — 99204 OFFICE O/P NEW MOD 45 MIN: CPT | Mod: S$GLB,,, | Performed by: INTERNAL MEDICINE

## 2022-05-02 PROCEDURE — 1160F RVW MEDS BY RX/DR IN RCRD: CPT | Mod: CPTII,S$GLB,, | Performed by: INTERNAL MEDICINE

## 2022-05-02 PROCEDURE — 1160F PR REVIEW ALL MEDS BY PRESCRIBER/CLIN PHARMACIST DOCUMENTED: ICD-10-PCS | Mod: CPTII,S$GLB,, | Performed by: INTERNAL MEDICINE

## 2022-05-02 PROCEDURE — 1159F MED LIST DOCD IN RCRD: CPT | Mod: CPTII,S$GLB,, | Performed by: INTERNAL MEDICINE

## 2022-05-02 PROCEDURE — 3288F FALL RISK ASSESSMENT DOCD: CPT | Mod: CPTII,S$GLB,, | Performed by: INTERNAL MEDICINE

## 2022-05-02 PROCEDURE — 1125F PR PAIN SEVERITY QUANTIFIED, PAIN PRESENT: ICD-10-PCS | Mod: CPTII,S$GLB,, | Performed by: INTERNAL MEDICINE

## 2022-05-02 PROCEDURE — 1101F PT FALLS ASSESS-DOCD LE1/YR: CPT | Mod: CPTII,S$GLB,, | Performed by: INTERNAL MEDICINE

## 2022-05-02 PROCEDURE — 3288F PR FALLS RISK ASSESSMENT DOCUMENTED: ICD-10-PCS | Mod: CPTII,S$GLB,, | Performed by: INTERNAL MEDICINE

## 2022-05-02 PROCEDURE — 1125F AMNT PAIN NOTED PAIN PRSNT: CPT | Mod: CPTII,S$GLB,, | Performed by: INTERNAL MEDICINE

## 2022-05-02 PROCEDURE — 99499 RISK ADDL DX/OHS AUDIT: ICD-10-PCS | Mod: S$GLB,,, | Performed by: INTERNAL MEDICINE

## 2022-05-02 PROCEDURE — 99499 UNLISTED E&M SERVICE: CPT | Mod: S$GLB,,, | Performed by: INTERNAL MEDICINE

## 2022-05-02 PROCEDURE — 99999 PR PBB SHADOW E&M-EST. PATIENT-LVL III: CPT | Mod: PBBFAC,,, | Performed by: INTERNAL MEDICINE

## 2022-05-02 PROCEDURE — 99204 PR OFFICE/OUTPT VISIT, NEW, LEVL IV, 45-59 MIN: ICD-10-PCS | Mod: S$GLB,,, | Performed by: INTERNAL MEDICINE

## 2022-05-02 PROCEDURE — 1101F PR PT FALLS ASSESS DOC 0-1 FALLS W/OUT INJ PAST YR: ICD-10-PCS | Mod: CPTII,S$GLB,, | Performed by: INTERNAL MEDICINE

## 2022-05-02 PROCEDURE — 99999 PR PBB SHADOW E&M-EST. PATIENT-LVL III: ICD-10-PCS | Mod: PBBFAC,,, | Performed by: INTERNAL MEDICINE

## 2022-05-02 PROCEDURE — 1159F PR MEDICATION LIST DOCUMENTED IN MEDICAL RECORD: ICD-10-PCS | Mod: CPTII,S$GLB,, | Performed by: INTERNAL MEDICINE

## 2022-05-02 NOTE — PROGRESS NOTES
RHEUMATOLOGY CLINIC INITIAL VISIT    Reason for consult:-  Positive SHOAIB.    Chief complaints, HPI, ROS, EXAM, Assessment & Plans:-  Claude L Tayloris a 89 y.o. pleasant male comes in with newly diagnosed positive SHOAIB and longstanding history of keratoconjunctivitis sicca bilateral eyes.  Longstanding history of osteoarthritis.  He complains of activity-related pain of around right foot, bilateral knees, hands associated with stiffness and swelling.  No significant dry mouth.  Severe dry eyes not responding to multiple medications and interventions tried so far.  Right eye is the worst.  No recurrent parotitis.  No lymphadenopathy.  Rheumatological review of system negative otherwise.  Physical examination shows chronic Heberden's and Juan's nodule bilateral hands but no active synovitis.  Osteoarthritic changes of bilateral knees and feet.  No synovitis or effusion.  No parotid swelling.    1. Positive SHOAIB (antinuclear antibody)    2. Keratoconjunctivitis sicca due to decreased tear production, bilateral    3. Primary osteoarthritis involving multiple joints      Problem List Items Addressed This Visit     Positive SHOAIB (antinuclear antibody) - Primary    Keratoconjunctivitis sicca due to decreased tear production, bilateral    Primary osteoarthritis involving multiple joints           Severe osteoarthritis bilateral hands and large joints without any associated synovitis or effusion to consider immunomodulator or immunosuppressive therapy.   Take Tylenol 650 mg 1 tablet twice daily for osteoarthritis pain.   Take high-dose glucosamine chondroitin 1 tablet twice daily.   Inject joints if pain worsens.   Low titer positive SHOAIB with severe keratoconjunctivitis sicca of bilateral eyes without any evidence of dry mouth or other clinical features to support diagnosis of Sjogren's syndrome.  No evidence of lupus, Sjogren's, scleroderma, myositis, rheumatoid arthritis or undifferentiated connective tissue  "disease.   He might not benefit from hydroxychloroquine or any immunosuppressive therapy trial to improve his care to conjunctivitis sicca since it does not look like driven by an underlying inflammatory process.   Continue follow-up with ophthalmologist.    # Follow up if symptoms worsen or fail to improve.      Past Medical History:   Diagnosis Date    *Atrial fibrillation     pacer Dr Barrios    Anticoagulant long-term use     Cancer     skin    Cervical neck pain with evidence of disc disease     Colon polyps 12/14/2015    Coronary artery disease     Depression     Diverticulosis     Hypertension     Kidney stone     Skin cancer        Past Surgical History:   Procedure Laterality Date    back fusion      CARDIAC PACEMAKER PLACEMENT      CARDIAC VALVE REPLACEMENT      CHOLECYSTECTOMY      COLONOSCOPY  3/22/2013    COLONOSCOPY N/A 12/14/2015    Procedure: COLONOSCOPY;  Surgeon: Gonzalo Gorman MD;  Location: [facility];  Service: Endoscopy;  Laterality: N/A;    CORONARY ARTERY BYPASS GRAFT      ELBOW BURSA SURGERY      left    EYE SURGERY      FOOT SURGERY      MITRAL VALVE REPLACEMENT      REPLACEMENT OF PACEMAKER  09/2019    SHOULDER SURGERY      SKIN CANCER EXCISION  01/2018/   04/2018 Dr.Thorla Jones    lower left leg     SPINE SURGERY  3 surgeries    TONSILLECTOMY          Social History     Tobacco Use    Smoking status: Never Smoker    Smokeless tobacco: Never Used    Tobacco comment: never a smoker   Substance Use Topics    Alcohol use: Yes     Alcohol/week: 3.0 standard drinks     Types: 2 Glasses of wine, 1 Cans of beer per week     Comment: drinks above only with meals    Drug use: No       Family History   Problem Relation Age of Onset    Cancer Father     Arthritis Father     Colon cancer Neg Hx        Review of patient's allergies indicates:   Allergen Reactions    Oxycodone      "it's mental/emotional       Medication List with Changes/Refills   Current " Medications    CYANOCOBALAMIN, VITAMIN B-12, 1,000 MCG/ML DROP    Place 5,000 mcg under the tongue once daily.    CYCLOSPORINE (RESTASIS) 0.05 % OPHTHALMIC EMULSION    Place 1 drop into both eyes 2 (two) times daily.    DICLOFENAC SODIUM (VOLTAREN) 1 % GEL    Apply 2 g topically daily as needed (foot pain).    FUROSEMIDE (LASIX) 20 MG TABLET    Take 1 tablet (20 mg total) by mouth daily as needed.    GABAPENTIN (NEURONTIN) 100 MG CAPSULE    Take 2 capsules in the morning, 2 capsules at  mid-day, and 3 capsule by mouth at bedtime for neuropathy    KETOCONAZOLE (NIZORAL) 2 % SHAMPOO    Shampoo/wash daily or as needed for flares.    LATANOPROST 0.005 % OPHTHALMIC SOLUTION    Instill 1 drop into the left eye every evening.    OXYBUTYNIN (DITROPAN) 5 MG TAB    Take 1 tablet (5 mg total) by mouth once daily. For overactive bladder    -PROPYLENE GLYCOL (SYSTANE, PROPYLENE GLYCOL,) 0.4-0.3 % DROP    Apply to eye.    PROPYLENE GLYCOL//PF (SYSTANE, PF, OPHT)    Apply to eye.    TAMSULOSIN (FLOMAX) 0.4 MG CAP    Take 2 capsules (0.8 mg total) by mouth every evening. For prostate and urine flow    WARFARIN (COUMADIN) 5 MG TABLET    Take 1 tablet by mouth every day or as directed by coumadin clinic   Discontinued Medications    FLUOCINONIDE (LIDEX) 0.05 % EXTERNAL SOLUTION    Apply to the affected area twice daily as needed.    FLUOROURACIL (EFUDEX) 5 % CREAM    Apply to affected area nightly for 3 weeks as tolerated.         Thank you for allowing me to participate in the care ofClaude L Taylor.    Disclaimer: This note was prepared using voice recognition system and is likely to have sound alike errors and is not proof read.  Please call me with any questions.

## 2022-05-02 NOTE — PATIENT INSTRUCTIONS
Take Tylenol 650 mg 1 tablet twice daily for osteoarthritis pain.  Take high-dose glucosamine chondroitin 1 tablet twice daily- brand name is osteo Bi-Flex.

## 2022-05-03 ENCOUNTER — PATIENT OUTREACH (OUTPATIENT)
Dept: ADMINISTRATIVE | Facility: OTHER | Age: 87
End: 2022-05-03
Payer: MEDICARE

## 2022-05-03 ENCOUNTER — OFFICE VISIT (OUTPATIENT)
Dept: PRIMARY CARE CLINIC | Facility: CLINIC | Age: 87
End: 2022-05-03
Payer: MEDICARE

## 2022-05-03 VITALS
SYSTOLIC BLOOD PRESSURE: 140 MMHG | OXYGEN SATURATION: 98 % | TEMPERATURE: 98 F | DIASTOLIC BLOOD PRESSURE: 74 MMHG | HEART RATE: 70 BPM | WEIGHT: 171.75 LBS | BODY MASS INDEX: 23.29 KG/M2

## 2022-05-03 DIAGNOSIS — Z95.0 CARDIAC PACEMAKER: ICD-10-CM

## 2022-05-03 DIAGNOSIS — M51.37 DDD (DEGENERATIVE DISC DISEASE), LUMBOSACRAL: ICD-10-CM

## 2022-05-03 DIAGNOSIS — G89.29 CHRONIC MIDLINE BACK PAIN, UNSPECIFIED BACK LOCATION: ICD-10-CM

## 2022-05-03 DIAGNOSIS — I73.9 PERIPHERAL VASCULAR DISEASE: ICD-10-CM

## 2022-05-03 DIAGNOSIS — I34.0 NONRHEUMATIC MITRAL (VALVE) INSUFFICIENCY: ICD-10-CM

## 2022-05-03 DIAGNOSIS — M54.9 CHRONIC MIDLINE BACK PAIN, UNSPECIFIED BACK LOCATION: ICD-10-CM

## 2022-05-03 DIAGNOSIS — N40.1 BENIGN PROSTATIC HYPERPLASIA WITH WEAK URINARY STREAM: ICD-10-CM

## 2022-05-03 DIAGNOSIS — R26.9 GAIT ABNORMALITY: ICD-10-CM

## 2022-05-03 DIAGNOSIS — E53.8 B12 DEFICIENCY DUE TO DIET: ICD-10-CM

## 2022-05-03 DIAGNOSIS — I48.21 PERMANENT ATRIAL FIBRILLATION: ICD-10-CM

## 2022-05-03 DIAGNOSIS — M19.90 OSTEOARTHRITIS, UNSPECIFIED OSTEOARTHRITIS TYPE, UNSPECIFIED SITE: ICD-10-CM

## 2022-05-03 DIAGNOSIS — Z79.01 LONG TERM (CURRENT) USE OF ANTICOAGULANTS: ICD-10-CM

## 2022-05-03 DIAGNOSIS — Z95.1 HX OF CABG: ICD-10-CM

## 2022-05-03 DIAGNOSIS — R39.12 BENIGN PROSTATIC HYPERPLASIA WITH WEAK URINARY STREAM: ICD-10-CM

## 2022-05-03 DIAGNOSIS — R06.02 SOB (SHORTNESS OF BREATH) ON EXERTION: ICD-10-CM

## 2022-05-03 DIAGNOSIS — J45.909 ASTHMA IN ADULT WITHOUT COMPLICATION, UNSPECIFIED ASTHMA SEVERITY, UNSPECIFIED WHETHER PERSISTENT: ICD-10-CM

## 2022-05-03 DIAGNOSIS — M48.061 SPINAL STENOSIS OF LUMBAR REGION AT MULTIPLE LEVELS: ICD-10-CM

## 2022-05-03 DIAGNOSIS — Z95.2 HX OF PROSTHETIC MITRAL VALVE: ICD-10-CM

## 2022-05-03 DIAGNOSIS — D69.6 THROMBOCYTOPENIA, UNSPECIFIED: ICD-10-CM

## 2022-05-03 DIAGNOSIS — I25.118 CORONARY ARTERY DISEASE OF NATIVE ARTERY OF NATIVE HEART WITH STABLE ANGINA PECTORIS: Primary | ICD-10-CM

## 2022-05-03 DIAGNOSIS — Z79.01 ANTICOAGULATED ON COUMADIN: ICD-10-CM

## 2022-05-03 DIAGNOSIS — K59.09 CHRONIC CONSTIPATION: ICD-10-CM

## 2022-05-03 PROCEDURE — 3288F PR FALLS RISK ASSESSMENT DOCUMENTED: ICD-10-PCS | Mod: CPTII,S$GLB,, | Performed by: FAMILY MEDICINE

## 2022-05-03 PROCEDURE — 99999 PR PBB SHADOW E&M-EST. PATIENT-LVL III: CPT | Mod: PBBFAC,,, | Performed by: FAMILY MEDICINE

## 2022-05-03 PROCEDURE — 1125F AMNT PAIN NOTED PAIN PRSNT: CPT | Mod: CPTII,S$GLB,, | Performed by: FAMILY MEDICINE

## 2022-05-03 PROCEDURE — 99214 OFFICE O/P EST MOD 30 MIN: CPT | Mod: S$GLB,,, | Performed by: FAMILY MEDICINE

## 2022-05-03 PROCEDURE — 99999 PR PBB SHADOW E&M-EST. PATIENT-LVL III: ICD-10-PCS | Mod: PBBFAC,,, | Performed by: FAMILY MEDICINE

## 2022-05-03 PROCEDURE — 1101F PT FALLS ASSESS-DOCD LE1/YR: CPT | Mod: CPTII,S$GLB,, | Performed by: FAMILY MEDICINE

## 2022-05-03 PROCEDURE — 99499 RISK ADDL DX/OHS AUDIT: ICD-10-PCS | Mod: S$GLB,,, | Performed by: FAMILY MEDICINE

## 2022-05-03 PROCEDURE — 1125F PR PAIN SEVERITY QUANTIFIED, PAIN PRESENT: ICD-10-PCS | Mod: CPTII,S$GLB,, | Performed by: FAMILY MEDICINE

## 2022-05-03 PROCEDURE — 1159F MED LIST DOCD IN RCRD: CPT | Mod: CPTII,S$GLB,, | Performed by: FAMILY MEDICINE

## 2022-05-03 PROCEDURE — 3288F FALL RISK ASSESSMENT DOCD: CPT | Mod: CPTII,S$GLB,, | Performed by: FAMILY MEDICINE

## 2022-05-03 PROCEDURE — 99499 UNLISTED E&M SERVICE: CPT | Mod: S$GLB,,, | Performed by: FAMILY MEDICINE

## 2022-05-03 PROCEDURE — 99214 PR OFFICE/OUTPT VISIT, EST, LEVL IV, 30-39 MIN: ICD-10-PCS | Mod: S$GLB,,, | Performed by: FAMILY MEDICINE

## 2022-05-03 PROCEDURE — 1159F PR MEDICATION LIST DOCUMENTED IN MEDICAL RECORD: ICD-10-PCS | Mod: CPTII,S$GLB,, | Performed by: FAMILY MEDICINE

## 2022-05-03 PROCEDURE — 1101F PR PT FALLS ASSESS DOC 0-1 FALLS W/OUT INJ PAST YR: ICD-10-PCS | Mod: CPTII,S$GLB,, | Performed by: FAMILY MEDICINE

## 2022-05-03 NOTE — PROGRESS NOTES
Subjective:      Patient ID: Claude L Taylor is a 89 y.o. male.    Chief Complaint: Annual Exam    Disclaimer:  This note is prepared using voice recognition software and as such is likely to have errors and has not been proof read. Please contact me for questions.       Mostly with arthritis complaints with knees, toes, hands, and legs.  Saw Dr GEE Doing osteo biflex but hard to swallow.     Seeing dr dueñas tomorrow.   Wants to get covid booster tomorrow.   Saw Dr alcocer and doesn't think his ees are closing. And is squinting a lot.   Breathes hard after a little exertion. No worse but no improvement.   Wants a walker rollator. Through [employer].     Otherwise most symptoms are stable for him.   No new concerns.   Seeing dr carroll for derm issues   Seeing Dr. Medrano, cards and seeing every 6 months. Replaced pacemaker. Saw Dr Carmona, this one with 11 yo battery life. doing well. No change with energy.  bp looks good. Ankles and feet haven't been swelling since the change in the pacemaker.     Mr. Barron presents to clinic today for a routine follow-up on his chronic conditions.  Overall he is doing about the same.  Has some chronic body pains.  Did see some good improvement with use of magnesium oil spray.   seeing Ophthalmology for severe dry eyes and he has been having a lot of dry mouth but seems to be stable now. .    No results found for: HGBA1C   Lab Results       Component                Value               Date                       CHOL                     154                 10/27/2021                 CHOL                     150                 04/21/2021                 CHOL                     150                 09/08/2020            Lab Results       Component                Value               Date                       LDLCALC                  82.8                10/27/2021                 LDLCALC                  83.4                04/21/2021                 LDLCALC                  83.6                 09/08/2020              Wt Readings from Last 10 Encounters:  05/03/22 : 77.9 kg (171 lb 11.8 oz)  05/02/22 : 76.9 kg (169 lb 8.5 oz)  02/08/22 : 78.8 kg (173 lb 9.8 oz)  11/09/21 : 79 kg (174 lb 2.6 oz)  11/03/21 : 79.3 kg (174 lb 13.2 oz)  07/27/21 : 81.4 kg (179 lb 7.3 oz)  05/03/21 : 80.7 kg (178 lb 0.3 oz)  12/28/20 : 82.9 kg (182 lb 10.4 oz)  11/02/20 : 81.9 kg (180 lb 8.9 oz)  03/12/20 : 82.6 kg (182 lb 1.6 oz)      The ASCVD Risk score (Milan SALEEM Jr., et al., 2013) failed to calculate for the following reasons:    The 2013 ASCVD risk score is only valid for ages 40 to 79      Patient Active Problem List:     Depression     Coronary artery disease involving native coronary artery of native heart with angina pectoris     Asthma in adult     Long term (current) use of anticoagulants     Osteoarthritis     Chronic back pain     Altered bowel habits     Anismus     Diverticulosis of large intestine without hemorrhage     Chronic constipation     Hx of CABG     Coronary artery disease involving native coronary artery of native heart without angina pectoris     Spinal stenosis of lumbar region at multiple levels     DDD (degenerative disc disease), lumbosacral     Proximal muscle weakness     OAB (overactive bladder)     Permanent atrial fibrillation     SOB (shortness of breath) on exertion     Benign prostatic hyperplasia with weak urinary stream     Sacroiliitis     Anticoagulated on Coumadin     B12 deficiency due to diet     Complete AV block     Cardiac pacemaker     Hx of prosthetic mitral valve     Nonrheumatic mitral (valve) insufficiency     Peripheral vascular disease     Primary osteoarthritis of both knees     Thrombocytopenia, unspecified     Atrial fibrillation, chronic     Osteoporosis     Dependence on other enabling machines and devices     Positive SHOAIB (antinuclear antibody)     Keratoconjunctivitis sicca due to decreased tear production, bilateral     Primary osteoarthritis involving multiple  joints          Lab Results   Component Value Date    WBC 5.55 02/08/2022    HGB 13.0 (L) 02/08/2022    HCT 40.3 02/08/2022     02/08/2022    CHOL 154 10/27/2021    TRIG 81 10/27/2021    HDL 55 10/27/2021    ALT 7 (L) 02/08/2022    AST 16 02/08/2022     (L) 02/08/2022    K 4.5 02/08/2022     02/08/2022    CREATININE 0.9 02/08/2022    BUN 11 02/08/2022    CO2 27 02/08/2022    TSH 0.684 10/27/2021    PSA 2.3 09/20/2019    INR 1.0 12/14/2015       OCT, Optic Nerve - OU - Both Eyes  Right Eye  Scan locations included Retinal Nerve Fiber Layer (RNFL).     Left Eye  Scan locations included Retinal Nerve Fiber Layer (RNFL).     Findings  Right Eye  Borderline. NS, TS. Progression has been stable.     Left Eye  Borderline. TS, NS. Progression has been stable.         Review of Systems   Constitutional: Positive for activity change (Only with exertion) and fatigue. Negative for fever and unexpected weight change.   HENT: Negative.  Negative for congestion.    Respiratory: Positive for shortness of breath. Negative for chest tightness and wheezing.    Cardiovascular: Negative.  Negative for chest pain, palpitations and leg swelling.   Gastrointestinal: Negative.  Negative for abdominal distention and abdominal pain.   Genitourinary: Negative.    Musculoskeletal: Positive for arthralgias ( bilateral foot pain, bilateral knee pain), back pain, myalgias, neck pain and neck stiffness.   Skin: Negative.    Neurological: Positive for weakness. Negative for dizziness, light-headedness, numbness and headaches.   Hematological: Negative.    Psychiatric/Behavioral: Positive for sleep disturbance. Negative for decreased concentration and dysphoric mood. The patient is not nervous/anxious.      Objective:     Vitals:    05/03/22 1007   BP: (!) 140/74   Pulse: 70   Temp: 98 °F (36.7 °C)   SpO2: 98%   Weight: 77.9 kg (171 lb 11.8 oz)     Physical Exam  Vitals and nursing note reviewed.   Constitutional:       Appearance:  Normal appearance. He is well-developed and normal weight.   HENT:      Head: Normocephalic and atraumatic.      Right Ear: Tympanic membrane and external ear normal.      Left Ear: Tympanic membrane and external ear normal.      Nose: Nose normal.   Eyes:      Conjunctiva/sclera: Conjunctivae normal.   Cardiovascular:      Rate and Rhythm: Normal rate. Rhythm irregular.   Pulmonary:      Effort: Pulmonary effort is normal.      Breath sounds: Normal breath sounds.   Musculoskeletal:      Cervical back: Normal range of motion and neck supple.      Lumbar back: Tenderness and bony tenderness present. No spasms. Decreased range of motion.      Right knee: Decreased range of motion. Tenderness present over the medial joint line.      Left knee: Decreased range of motion. Tenderness present over the medial joint line.   Skin:     Findings: No erythema or lesion.   Neurological:      General: No focal deficit present.      Mental Status: He is alert. Mental status is at baseline.      Gait: Gait abnormal.   Psychiatric:         Mood and Affect: Mood normal.         Speech: Speech normal.         Behavior: Behavior normal.         Thought Content: Thought content normal.         Judgment: Judgment normal.       Assessment:     1. Coronary artery disease involving native coronary artery of native heart without angina pectoris    2. Hx of CABG    3. Permanent atrial fibrillation    4. SOB (shortness of breath) on exertion    5. Cardiac pacemaker    6. Hx of prosthetic mitral valve    7. Nonrheumatic mitral (valve) insufficiency    8. Peripheral vascular disease    9. Benign prostatic hyperplasia with weak urinary stream    10. Long term (current) use of anticoagulants    11. Thrombocytopenia, unspecified    12. Anticoagulated on Coumadin    13. B12 deficiency due to diet    14. Chronic constipation    15. Chronic midline back pain, unspecified back location    16. Osteoarthritis, unspecified osteoarthritis type, unspecified  site    17. Spinal stenosis of lumbar region at multiple levels    18. DDD (degenerative disc disease), lumbosacral    19. Asthma in adult without complication, unspecified asthma severity, unspecified whether persistent    20. Gait abnormality      Plan:   Claude was seen today for annual exam.    Diagnoses and all orders for this visit:    Coronary artery disease involving native coronary artery of native heart without angina pectoris - stable, Continue with current medications and interventions. Labs reviewed.       Hx of CABG- stable, Continue with current medications and interventions. Labs reviewed.     Permanent atrial fibrillation- stable, Continue with current medications and interventions. Labs reviewed.     SOB (shortness of breath) on exertion- stable, Continue with current medications and interventions. Labs reviewed.     Cardiac pacemaker- stable, Continue with current medications and interventions. Labs reviewed.     Hx of prosthetic mitral valve- stable, Continue with current medications and interventions. Labs reviewed.     Nonrheumatic mitral (valve) insufficiency- stable, Continue with current medications and interventions. Labs reviewed.     Peripheral vascular disease- stable, Continue with current medications and interventions. Labs reviewed.     Benign prostatic hyperplasia with weak urinary stream- stable, Continue with current medications and interventions. Labs reviewed.     Long term (current) use of anticoagulants- stable, Continue with current medications and interventions. Labs reviewed.     Thrombocytopenia, unspecified- stable, Continue with current medications and interventions. Labs reviewed.     Anticoagulated on Coumadin- stable, Continue with current medications and interventions. Labs reviewed.   B12 deficiency due to diet- stable, Continue with current medications and interventions. Labs reviewed.     Chronic constipation- stable, Continue with current medications and  interventions. Labs reviewed.   Chronic midline back pain, unspecified back location- stable, Continue with current medications and interventions. Labs reviewed.     Osteoarthritis, unspecified osteoarthritis type, unspecified site- stable, Continue with current medications and interventions. Labs reviewed. Can crush osteobiflex and put with applesauce    Spinal stenosis of lumbar region at multiple levels- given rx for walker rollator and also for [employer] to come and check out scooter battery for him.   -     WALKER FOR HOME USE  -     HME - OTHER    DDD (degenerative disc disease), lumbosacral- given rx for walker rollator and also for [employer] to come and check out scooter battery for him.     -     WALKER FOR HOME USE  -     HME - OTHER    Asthma in adult without complication, unspecified asthma severity, unspecified whether persistent- - stable, Continue with current medications and interventions. Labs reviewed.         Gait abnormality - given rx for walker rollator and also for [facility] to come and check out scooter battery for him.     -     WALKER FOR HOME USE  -     HME - OTHER            Follow up in about 3 months (around 8/3/2022) for f/u Theresa Lundy NP/ chronic, and dr cruz in 6mo .    There are no Patient Instructions on file for this visit.

## 2022-05-04 ENCOUNTER — OFFICE VISIT (OUTPATIENT)
Dept: OPHTHALMOLOGY | Facility: CLINIC | Age: 87
End: 2022-05-04
Payer: MEDICARE

## 2022-05-04 DIAGNOSIS — M35.01 KERATOCONJUNCTIVITIS SICCA: ICD-10-CM

## 2022-05-04 DIAGNOSIS — H40.1132 PRIMARY OPEN ANGLE GLAUCOMA (POAG) OF BOTH EYES, MODERATE STAGE: Primary | ICD-10-CM

## 2022-05-04 DIAGNOSIS — H10.9 BACTERIAL CONJUNCTIVITIS OF BOTH EYES: ICD-10-CM

## 2022-05-04 DIAGNOSIS — H02.883 MEIBOMIAN GLAND DYSFUNCTION (MGD) OF BOTH EYES: ICD-10-CM

## 2022-05-04 DIAGNOSIS — B96.89 BACTERIAL CONJUNCTIVITIS OF BOTH EYES: ICD-10-CM

## 2022-05-04 DIAGNOSIS — H02.886 MEIBOMIAN GLAND DYSFUNCTION (MGD) OF BOTH EYES: ICD-10-CM

## 2022-05-04 PROCEDURE — 1159F PR MEDICATION LIST DOCUMENTED IN MEDICAL RECORD: ICD-10-PCS | Mod: CPTII,S$GLB,, | Performed by: OPHTHALMOLOGY

## 2022-05-04 PROCEDURE — 99214 PR OFFICE/OUTPT VISIT, EST, LEVL IV, 30-39 MIN: ICD-10-PCS | Mod: S$GLB,,, | Performed by: OPHTHALMOLOGY

## 2022-05-04 PROCEDURE — 99214 OFFICE O/P EST MOD 30 MIN: CPT | Mod: S$GLB,,, | Performed by: OPHTHALMOLOGY

## 2022-05-04 PROCEDURE — 99999 PR PBB SHADOW E&M-EST. PATIENT-LVL III: ICD-10-PCS | Mod: PBBFAC,,, | Performed by: OPHTHALMOLOGY

## 2022-05-04 PROCEDURE — 1159F MED LIST DOCD IN RCRD: CPT | Mod: CPTII,S$GLB,, | Performed by: OPHTHALMOLOGY

## 2022-05-04 PROCEDURE — 99999 PR PBB SHADOW E&M-EST. PATIENT-LVL III: CPT | Mod: PBBFAC,,, | Performed by: OPHTHALMOLOGY

## 2022-05-04 RX ORDER — GATIFLOXACIN 5 MG/ML
1 SOLUTION/ DROPS OPHTHALMIC 2 TIMES DAILY
Qty: 2.5 ML | Refills: 3 | Status: SHIPPED | OUTPATIENT
Start: 2022-05-04 | End: 2022-07-19 | Stop reason: ALTCHOICE

## 2022-05-04 RX ORDER — ERYTHROMYCIN 5 MG/G
OINTMENT OPHTHALMIC NIGHTLY
Qty: 3.5 G | Refills: 4 | Status: SHIPPED | OUTPATIENT
Start: 2022-05-04 | End: 2022-07-19 | Stop reason: ALTCHOICE

## 2022-05-04 RX ORDER — FLUOROMETHOLONE 1 MG/ML
1 SUSPENSION/ DROPS OPHTHALMIC 2 TIMES DAILY
Qty: 10 ML | Refills: 3 | Status: SHIPPED | OUTPATIENT
Start: 2022-05-04 | End: 2022-10-14 | Stop reason: ALTCHOICE

## 2022-05-04 RX ORDER — EYELID CLEANSER COMBINATION 1
1 FOAM (ML) TOPICAL 2 TIMES DAILY
Qty: 1 EACH | Status: SHIPPED | OUTPATIENT
Start: 2022-05-04 | End: 2022-10-14 | Stop reason: ALTCHOICE

## 2022-05-04 RX ORDER — TAFLUPROST 0 MG/.3ML
1 SOLUTION/ DROPS OPHTHALMIC NIGHTLY
Qty: 30 EACH | Refills: 6 | Status: SHIPPED | OUTPATIENT
Start: 2022-05-04 | End: 2022-07-21

## 2022-05-04 NOTE — PROGRESS NOTES
HPI     Dry Eye     Comments: Ref by Dr Brown for second opinion MACEY              Comments     Patient states that he stopped Restasis and Celluvisc on 5/1/22 - cont   Latanoprost OS as directed and warm compresses - saw Rheumatologist on   Monday had low Positive SHOAIB but did not feel he was Sjorgrens or would   benefit from immunosuppressive therapy - patient states that OU are so   light sensitive that he has to wear sunglasses constantly even when   watching TV        [facility]GM patient 1999     PCIOL OD 6-1998 (Dr. Green)   PCIOL OS 2-1999 (Dr. Sampson)     POAG   Dry eyes   Refresh Celluvisc QID OU   Restasis BID OU  Latanoprost QHS OS            Last edited by Helena Connelly MA on 5/4/2022  2:41 PM. (History)            Assessment /Plan     For exam results, see Encounter Report.      ICD-10-CM ICD-9-CM   1. Primary open angle glaucoma (POAG) of both eyes, moderate stage   Based on oct findings new staging today     Would benefit from pres free coag drops only at this time  H40.1132 365.11     365.72   2. Meibomian gland dysfunction (MGD) of both eyes  H02.883 374.89    H02.886    3. Keratoconjunctivitis sicca - advanced   M35.01 710.2   4. Bacterial conjunctivitis of both eyes - present today on exam   Will start antibiotic drops and ointment  H10.9 372.30    B96.89      Stop Latanoprost and trial Preservative Free Zioptan QHS   Discussion with pt if pf zioptan is not covered he can let us know and we Can send RX to [facility]   Pt was given info and order form for this if it is not covered.  Will recommend more aggressive treatment OS if iop is still elevated at the next visit. Reluctant to start an additional topical med at this time due to ocular surface issues. May need Slt at the very least.    Recommend Sterilid scrubs, warm compresses , start Emycin Ointment at bedtime -   Stop baby Shampoo   Preservative Free Systane Hydration 4-6 times daily   Start FML BID OU   Start Gati BID OU      RETURN TO CLINIC 3 weeks IOP and sophie dry eyes/IOP

## 2022-05-04 NOTE — PROGRESS NOTES
Health Maintenance Due   Topic Date Due    Shingles Vaccine (2 of 3) 04/11/2013    COVID-19 Vaccine (4 - Booster for Pfizer series) 03/01/2022     Updates were requested from care everywhere.  Chart was reviewed for overdue Proactive [facility] Encounters ([facility]) topics (CRS, Breast Cancer Screening, Eye exam)  Health Maintenance has been updated.  LINKS immunization registry triggered.  Immunizations were reconciled.

## 2022-05-05 ENCOUNTER — TELEPHONE (OUTPATIENT)
Dept: PHARMACY | Facility: CLINIC | Age: 87
End: 2022-05-05
Payer: MEDICARE

## 2022-05-09 ENCOUNTER — TELEPHONE (OUTPATIENT)
Dept: PRIMARY CARE CLINIC | Facility: CLINIC | Age: 87
End: 2022-05-09
Payer: MEDICARE

## 2022-05-09 NOTE — TELEPHONE ENCOUNTER
Pt spoke with [facility] over the weekend, states PF zioptan is $60 and he would prefer an alternative to this medication. Mgm out today, will discuss alternative when he returns tomorrow.

## 2022-05-09 NOTE — TELEPHONE ENCOUNTER
----- Message from Samia Gomez sent at 5/9/2022 10:25 AM CDT -----  Contact: Claude Claude is needing a call back regarding needing orders for a walker and to have the batteries checked as well sent to [facility]. Please call him back at [phone] or [phone]

## 2022-05-10 ENCOUNTER — TELEPHONE (OUTPATIENT)
Dept: OPHTHALMOLOGY | Facility: CLINIC | Age: 87
End: 2022-05-10
Payer: MEDICARE

## 2022-05-10 ENCOUNTER — IMMUNIZATION (OUTPATIENT)
Dept: PHARMACY | Facility: CLINIC | Age: 87
End: 2022-05-10
Payer: MEDICARE

## 2022-05-10 DIAGNOSIS — Z23 NEED FOR VACCINATION: Primary | ICD-10-CM

## 2022-05-10 DIAGNOSIS — H40.1132 PRIMARY OPEN ANGLE GLAUCOMA (POAG) OF BOTH EYES, MODERATE STAGE: Primary | ICD-10-CM

## 2022-05-10 RX ORDER — TRAVOPROST OPHTHALMIC SOLUTION 0.04 MG/ML
1 SOLUTION OPHTHALMIC NIGHTLY
Qty: 2.5 ML | Refills: 6 | Status: SHIPPED | OUTPATIENT
Start: 2022-05-10 | End: 2022-07-26 | Stop reason: SDUPTHER

## 2022-05-10 NOTE — TELEPHONE ENCOUNTER
[facility]  called and defers [place] Rx due to cost, will try Travatan Z and pt will use that as he defers all other preservative free options

## 2022-05-10 NOTE — TELEPHONE ENCOUNTER
Andreina took care of this in the middle of clinic after patient walked in downstairs to the pharmacy.        ----- Message from Yasmeen Mccrary sent at 5/10/2022  9:45 AM CDT -----  [phone] please call pt back did not want to leave any details states he spoke with nurse on yesterday about same issues pls call

## 2022-05-16 ENCOUNTER — OFFICE VISIT (OUTPATIENT)
Dept: OPHTHALMOLOGY | Facility: CLINIC | Age: 87
End: 2022-05-16
Payer: MEDICARE

## 2022-05-16 DIAGNOSIS — H16.213 EXPOSURE KERATOCONJUNCTIVITIS OF BOTH EYES: ICD-10-CM

## 2022-05-16 DIAGNOSIS — M35.01 KERATOCONJUNCTIVITIS SICCA: Primary | ICD-10-CM

## 2022-05-16 DIAGNOSIS — H40.1132 PRIMARY OPEN ANGLE GLAUCOMA (POAG) OF BOTH EYES, MODERATE STAGE: ICD-10-CM

## 2022-05-16 PROCEDURE — 1160F RVW MEDS BY RX/DR IN RCRD: CPT | Mod: CPTII,S$GLB,, | Performed by: STUDENT IN AN ORGANIZED HEALTH CARE EDUCATION/TRAINING PROGRAM

## 2022-05-16 PROCEDURE — 1159F PR MEDICATION LIST DOCUMENTED IN MEDICAL RECORD: ICD-10-PCS | Mod: CPTII,S$GLB,, | Performed by: STUDENT IN AN ORGANIZED HEALTH CARE EDUCATION/TRAINING PROGRAM

## 2022-05-16 PROCEDURE — 99214 PR OFFICE/OUTPT VISIT, EST, LEVL IV, 30-39 MIN: ICD-10-PCS | Mod: S$GLB,,, | Performed by: STUDENT IN AN ORGANIZED HEALTH CARE EDUCATION/TRAINING PROGRAM

## 2022-05-16 PROCEDURE — 99999 PR PBB SHADOW E&M-EST. PATIENT-LVL III: CPT | Mod: PBBFAC,,, | Performed by: STUDENT IN AN ORGANIZED HEALTH CARE EDUCATION/TRAINING PROGRAM

## 2022-05-16 PROCEDURE — 99214 OFFICE O/P EST MOD 30 MIN: CPT | Mod: S$GLB,,, | Performed by: STUDENT IN AN ORGANIZED HEALTH CARE EDUCATION/TRAINING PROGRAM

## 2022-05-16 PROCEDURE — 1159F MED LIST DOCD IN RCRD: CPT | Mod: CPTII,S$GLB,, | Performed by: STUDENT IN AN ORGANIZED HEALTH CARE EDUCATION/TRAINING PROGRAM

## 2022-05-16 PROCEDURE — 1160F PR REVIEW ALL MEDS BY PRESCRIBER/CLIN PHARMACIST DOCUMENTED: ICD-10-PCS | Mod: CPTII,S$GLB,, | Performed by: STUDENT IN AN ORGANIZED HEALTH CARE EDUCATION/TRAINING PROGRAM

## 2022-05-16 PROCEDURE — 99999 PR PBB SHADOW E&M-EST. PATIENT-LVL III: ICD-10-PCS | Mod: PBBFAC,,, | Performed by: STUDENT IN AN ORGANIZED HEALTH CARE EDUCATION/TRAINING PROGRAM

## 2022-05-16 NOTE — PROGRESS NOTES
HPI     Pt in today with complaints of red painful eyes that started Thursday or   Friday of last week. Pt states he can't see much out of his right eye. Pt   states his eye doesn't hurt like it was hurting yesterday. Pt states he   didn't use any drops today. Pt states the light does hurt his eyes.  PCIOL OD 6-1998 (Dr. Green)   PCIOL OS 2-1999 (Dr. Sampson)     POAG   Dry eyes   Refresh Celluvisc QID OU ( d/c'd 5/1/22)  Restasis BID OU (d/c'd 5/1/22)  Latanoprost QHS OS      Last edited by Di Diaz on 5/16/2022  2:16 PM. (History)            Assessment /Plan     For exam results, see Encounter Report.    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 W/  for R/C and IOP check

## 2022-05-23 ENCOUNTER — OFFICE VISIT (OUTPATIENT)
Dept: OPHTHALMOLOGY | Facility: CLINIC | Age: 87
End: 2022-05-23
Payer: MEDICARE

## 2022-05-23 DIAGNOSIS — H40.1132 PRIMARY OPEN ANGLE GLAUCOMA (POAG) OF BOTH EYES, MODERATE STAGE: ICD-10-CM

## 2022-05-23 DIAGNOSIS — H01.024 SQUAMOUS BLEPHARITIS OF UPPER EYELIDS OF BOTH EYES: ICD-10-CM

## 2022-05-23 DIAGNOSIS — H16.213 EXPOSURE KERATOCONJUNCTIVITIS OF BOTH EYES: ICD-10-CM

## 2022-05-23 DIAGNOSIS — H01.021 SQUAMOUS BLEPHARITIS OF UPPER EYELIDS OF BOTH EYES: ICD-10-CM

## 2022-05-23 DIAGNOSIS — H02.103 ECTROPION DUE TO LAXITY OF EYELID, RIGHT: ICD-10-CM

## 2022-05-23 DIAGNOSIS — M35.01 KERATOCONJUNCTIVITIS SICCA: Primary | ICD-10-CM

## 2022-05-23 DIAGNOSIS — H02.889 MEIBOMIAN GLAND DYSFUNCTION: ICD-10-CM

## 2022-05-23 PROCEDURE — 1160F RVW MEDS BY RX/DR IN RCRD: CPT | Mod: CPTII,S$GLB,, | Performed by: STUDENT IN AN ORGANIZED HEALTH CARE EDUCATION/TRAINING PROGRAM

## 2022-05-23 PROCEDURE — 99214 OFFICE O/P EST MOD 30 MIN: CPT | Mod: S$GLB,,, | Performed by: STUDENT IN AN ORGANIZED HEALTH CARE EDUCATION/TRAINING PROGRAM

## 2022-05-23 PROCEDURE — 1159F PR MEDICATION LIST DOCUMENTED IN MEDICAL RECORD: ICD-10-PCS | Mod: CPTII,S$GLB,, | Performed by: STUDENT IN AN ORGANIZED HEALTH CARE EDUCATION/TRAINING PROGRAM

## 2022-05-23 PROCEDURE — 99999 PR PBB SHADOW E&M-EST. PATIENT-LVL II: ICD-10-PCS | Mod: PBBFAC,,, | Performed by: STUDENT IN AN ORGANIZED HEALTH CARE EDUCATION/TRAINING PROGRAM

## 2022-05-23 PROCEDURE — 99999 PR PBB SHADOW E&M-EST. PATIENT-LVL II: CPT | Mod: PBBFAC,,, | Performed by: STUDENT IN AN ORGANIZED HEALTH CARE EDUCATION/TRAINING PROGRAM

## 2022-05-23 PROCEDURE — 1160F PR REVIEW ALL MEDS BY PRESCRIBER/CLIN PHARMACIST DOCUMENTED: ICD-10-PCS | Mod: CPTII,S$GLB,, | Performed by: STUDENT IN AN ORGANIZED HEALTH CARE EDUCATION/TRAINING PROGRAM

## 2022-05-23 PROCEDURE — 99214 PR OFFICE/OUTPT VISIT, EST, LEVL IV, 30-39 MIN: ICD-10-PCS | Mod: S$GLB,,, | Performed by: STUDENT IN AN ORGANIZED HEALTH CARE EDUCATION/TRAINING PROGRAM

## 2022-05-23 PROCEDURE — 1159F MED LIST DOCD IN RCRD: CPT | Mod: CPTII,S$GLB,, | Performed by: STUDENT IN AN ORGANIZED HEALTH CARE EDUCATION/TRAINING PROGRAM

## 2022-05-23 RX ORDER — NEOMYCIN SULFATE, POLYMYXIN B SULFATE, AND DEXAMETHASONE 3.5; 10000; 1 MG/G; [USP'U]/G; MG/G
OINTMENT OPHTHALMIC 2 TIMES DAILY
Qty: 3.5 G | Refills: 2 | Status: SHIPPED | OUTPATIENT
Start: 2022-05-23 | End: 2022-07-19 | Stop reason: ALTCHOICE

## 2022-05-23 NOTE — PROGRESS NOTES
HPI     Pt in today for a dry eyes recheck. Pt states his vision is still blurry.   Pt states he followed the instructions on his medication and after using   the drops he thought he was going blind. Pt denies any ocular pain or   discomfort.     1.PCIOL OD 6-1998 (Dr. Green)   PCIOL OS 2-1999 (Dr. Sampson)   2. POAG   3. Dry eyes     Refresh Celluvisc QID OU ( d/c'd 5/1/22)  Restasis BID OU (d/c'd 5/1/22)  Latanoprost QHS OS      Last edited by Di Diaz on 5/23/2022  1:25 PM. (History)            Assessment /Plan     For exam results, see Encounter Report.    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 Apply to incision(s)  Dispense: 3.5 g; Refill: 2  Continue PF ATs and ointment at bedtime.    Patient having significant discomfort over the past year with no benefit of conservative management. Has component of exposure keratitis and right ectropion. Will send to oculoplastics for further management and possible surgical correction, referral sent to  at [facility], [place]

## 2022-05-26 ENCOUNTER — TELEPHONE (OUTPATIENT)
Dept: OPHTHALMOLOGY | Facility: CLINIC | Age: 87
End: 2022-05-26
Payer: MEDICARE

## 2022-05-26 NOTE — TELEPHONE ENCOUNTER
Attempted to return call - busy signal x 2         ----- Message from Khalida Mccrary sent at 5/26/2022  3:41 PM CDT -----  Contact: wife  The pt request a return call, no additional info given and can be reached at [phone]///thxMW

## 2022-05-27 ENCOUNTER — TELEPHONE (OUTPATIENT)
Dept: OPHTHALMOLOGY | Facility: CLINIC | Age: 87
End: 2022-05-27
Payer: MEDICARE

## 2022-05-27 NOTE — TELEPHONE ENCOUNTER
I spoke with Dr. 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 I called Dr. Bergman's office and they are closed on Fridays. I will call them back on Monday to schedule the patient an appointment to see Dr. Bergman.    ----- Message from Susi France sent at 5/27/2022  8:23 AM CDT -----  Name Of Caller: Claude     Provider Name: Jennifer Herman,       Relationship to the Pt?: pt    Contact Preference?: [phone]    What is the nature of the call?:Pt called and said he left message yesterday and your nurse supposed to call him back but have not heard back to please call him back     He said he left message on Wednesday as well     He said please call back today

## 2022-05-30 ENCOUNTER — TELEPHONE (OUTPATIENT)
Dept: OPHTHALMOLOGY | Facility: CLINIC | Age: 87
End: 2022-05-30
Payer: MEDICARE

## 2022-05-30 NOTE — TELEPHONE ENCOUNTER
does not accept his insurance, will refer him to [facility], they are closed today 5/30/22 for memorial day but we will call them tomorrow and get him referred, I did inform the patient

## 2022-05-31 ENCOUNTER — TELEPHONE (OUTPATIENT)
Dept: OPHTHALMOLOGY | Facility: CLINIC | Age: 87
End: 2022-05-31
Payer: MEDICARE

## 2022-05-31 NOTE — TELEPHONE ENCOUNTER
Discussed with patient a referall was faxed to  at [facility] for a lid eval,  will review his chart and call to schedule his lid eval, patient was informed

## 2022-06-01 ENCOUNTER — TELEPHONE (OUTPATIENT)
Dept: OPHTHALMOLOGY | Facility: CLINIC | Age: 87
End: 2022-06-01
Payer: MEDICARE

## 2022-06-01 NOTE — TELEPHONE ENCOUNTER
----- Message from Elsie Love MA sent at 6/1/2022  7:43 AM CDT -----  Please call the Pt #[phone] re: his appointment @ [facility].  It sounded that Mr. Barron could be confused as he received a message from [facility] [place] to schedule an appointment with Dr. Mckeon...    Thank you,  Elsie

## 2022-06-01 NOTE — TELEPHONE ENCOUNTER
spoke with [facility] today and they will be giving Mr.Claude a call today to [facility]. with , I let Claude know

## 2022-06-28 ENCOUNTER — TELEPHONE (OUTPATIENT)
Dept: PRIMARY CARE CLINIC | Facility: CLINIC | Age: 87
End: 2022-06-28
Payer: MEDICARE

## 2022-06-28 DIAGNOSIS — M25.532 LEFT WRIST PAIN: Primary | ICD-10-CM

## 2022-06-28 DIAGNOSIS — M79.642 LEFT HAND PAIN: ICD-10-CM

## 2022-06-28 NOTE — TELEPHONE ENCOUNTER
Which hand? Right or left?   Can he see a provider in [facility]? Willing to see a provider? Such as Ashley?

## 2022-06-28 NOTE — TELEPHONE ENCOUNTER
Left hand and wrist. No openings with Ashley tomorrow. He is having issues with transportation. He will be in [place] tomorrow morning already for an appt.

## 2022-06-28 NOTE — TELEPHONE ENCOUNTER
----- Message from Khalida Mccrary sent at 6/28/2022  7:44 AM CDT -----  Contact: pt  The pt needs a xray of his hand, no orders in the system, no additional info given and can be reached at [phone] or [phone]///thxMW

## 2022-06-28 NOTE — TELEPHONE ENCOUNTER
----- Message from Tiffany Sanderson sent at 6/27/2022  4:13 PM CDT -----  Contact: Claude millicent [phone]  1MEDICALADVICE     Patient is calling for Medical Advice regarding:    How long has patient had these symptoms:    Pharmacy name and phone#:    Would like response via [employer]: call back    Comments: Pt is requesting a call back from the nurse to see if he can schedule an xray before his next appt of his hand and wrist

## 2022-06-28 NOTE — TELEPHONE ENCOUNTER
Called pt, he stated that he would like to have an xray ordered of his hand and wrist to see what is going on with it. It has flared up on him and hurting. He stated old injury years ago playing baseball. He would like to have xray done on tomorrow while he is in [facility] for his eye appt.

## 2022-06-29 ENCOUNTER — OFFICE VISIT (OUTPATIENT)
Dept: OPHTHALMOLOGY | Facility: CLINIC | Age: 87
End: 2022-06-29
Payer: MEDICARE

## 2022-06-29 ENCOUNTER — HOSPITAL ENCOUNTER (OUTPATIENT)
Dept: RADIOLOGY | Facility: HOSPITAL | Age: 87
Discharge: HOME | End: 2022-06-29
Attending: FAMILY MEDICINE
Payer: MEDICARE

## 2022-06-29 DIAGNOSIS — H40.1132 PRIMARY OPEN ANGLE GLAUCOMA (POAG) OF BOTH EYES, MODERATE STAGE: ICD-10-CM

## 2022-06-29 DIAGNOSIS — M25.532 LEFT WRIST PAIN: ICD-10-CM

## 2022-06-29 DIAGNOSIS — H02.883 MEIBOMIAN GLAND DYSFUNCTION (MGD) OF BOTH EYES: Primary | ICD-10-CM

## 2022-06-29 DIAGNOSIS — M79.642 LEFT HAND PAIN: ICD-10-CM

## 2022-06-29 DIAGNOSIS — H02.886 MEIBOMIAN GLAND DYSFUNCTION (MGD) OF BOTH EYES: Primary | ICD-10-CM

## 2022-06-29 PROCEDURE — 73110 X-RAY EXAM OF WRIST: CPT | Mod: TC,FY,PO,LT

## 2022-06-29 PROCEDURE — 73110 XR WRIST COMPLETE 3 VIEWS LEFT: ICD-10-PCS | Mod: 26,LT,, | Performed by: RADIOLOGY

## 2022-06-29 PROCEDURE — 1159F MED LIST DOCD IN RCRD: CPT | Mod: CPTII,S$GLB,, | Performed by: OPTOMETRIST

## 2022-06-29 PROCEDURE — 73130 XR HAND COMPLETE 3 VIEW LEFT: ICD-10-PCS | Mod: 26,LT,, | Performed by: RADIOLOGY

## 2022-06-29 PROCEDURE — 1160F RVW MEDS BY RX/DR IN RCRD: CPT | Mod: CPTII,S$GLB,, | Performed by: OPTOMETRIST

## 2022-06-29 PROCEDURE — 1159F PR MEDICATION LIST DOCUMENTED IN MEDICAL RECORD: ICD-10-PCS | Mod: CPTII,S$GLB,, | Performed by: OPTOMETRIST

## 2022-06-29 PROCEDURE — 73130 X-RAY EXAM OF HAND: CPT | Mod: 26,LT,, | Performed by: RADIOLOGY

## 2022-06-29 PROCEDURE — 1160F PR REVIEW ALL MEDS BY PRESCRIBER/CLIN PHARMACIST DOCUMENTED: ICD-10-PCS | Mod: CPTII,S$GLB,, | Performed by: OPTOMETRIST

## 2022-06-29 PROCEDURE — 73130 X-RAY EXAM OF HAND: CPT | Mod: TC,FY,PO,LT

## 2022-06-29 PROCEDURE — 99999 PR PBB SHADOW E&M-EST. PATIENT-LVL III: CPT | Mod: PBBFAC,,, | Performed by: OPTOMETRIST

## 2022-06-29 PROCEDURE — 73110 X-RAY EXAM OF WRIST: CPT | Mod: 26,LT,, | Performed by: RADIOLOGY

## 2022-06-29 PROCEDURE — 99213 PR OFFICE/OUTPT VISIT, EST, LEVL III, 20-29 MIN: ICD-10-PCS | Mod: S$GLB,,, | Performed by: OPTOMETRIST

## 2022-06-29 PROCEDURE — 99999 PR PBB SHADOW E&M-EST. PATIENT-LVL III: ICD-10-PCS | Mod: PBBFAC,,, | Performed by: OPTOMETRIST

## 2022-06-29 PROCEDURE — 99213 OFFICE O/P EST LOW 20 MIN: CPT | Mod: S$GLB,,, | Performed by: OPTOMETRIST

## 2022-06-29 NOTE — PROGRESS NOTES
"HPI     Conjunctivitis     Comments: Pt states his sympotms have improved, no pain just a scartchy   feeling.Pt states gtts: Latanoprost qhs OU, and "Trovepost" from [employer], and   Clear Eyes redness relief, pt feels that these work better and last   longer. Pt is out of Latanoprost and is taking Trovepost.   Pt declines HVF          Last edited by Claudia Garcia MA on 6/29/2022 10:19 AM. (History)            Assessment /Plan     For exam results, see Encounter Report.    Meibomian gland dysfunction (MGD) of both eyes  Patient has seen outside cornea consult. Symptoms have dramatically improved after stopping all dry eye drops and using only Refresh Celluvisc QID with Travatan for glaucoma. Continue as directed.     Primary open angle glaucoma (POAG) of both eyes, moderate stage  IOP at [facility] today, continue Travatan QHS per Dr LOBO. Overdue for HVF 24-2 and follow up with Dr Sampson.     Continue   Refresh Celluvisc QID OU  Travatan QHS OU.     RTC with Dr LOBO for IOP check with HVF 24-2.                   "

## 2022-06-29 NOTE — Clinical Note
Aba Arana, can we please call Mr Barron to schedule him for f/u with Dr LOBO for IOP check. Before the IOP check he will require and HVF 24-2 as well.   Thanks, DT

## 2022-06-30 ENCOUNTER — TELEPHONE (OUTPATIENT)
Dept: PRIMARY CARE CLINIC | Facility: CLINIC | Age: 87
End: 2022-06-30
Payer: MEDICARE

## 2022-06-30 NOTE — TELEPHONE ENCOUNTER
----- Message from Concha Abbott sent at 6/30/2022  2:19 PM CDT -----  Regarding: Insect Bite  Contact: Patient  Please call patient concerning an insect bite on his left hand, it has his hand swollen. Please call at  [phone]

## 2022-06-30 NOTE — TELEPHONE ENCOUNTER
Spoke with pt and he says that it is getting better and he thinks he don't need to go to urgent care

## 2022-06-30 NOTE — TELEPHONE ENCOUNTER
Spoke with pt and he advised me that he got stung by a wasp a couple days ago. Pt verbalized that his hand is swollen and tingling. Pt want to Dr to tell him what he need to do.

## 2022-07-04 ENCOUNTER — NURSE TRIAGE (OUTPATIENT)
Dept: ADMINISTRATIVE | Facility: CLINIC | Age: 87
End: 2022-07-04
Payer: MEDICARE

## 2022-07-04 NOTE — TELEPHONE ENCOUNTER
"    Reason for Disposition   [1] Red or very tender (to touch) area AND [2] started over 24 hours after the sting    Additional Information   Negative: [1] Life-threatening reaction (anaphylaxis) in the past to sting AND [2] < 2 hours since sting   Negative: Attacked by swarm of bees now   Negative: Passed out (i.e., lost consciousness, collapsed and was not responding)   Negative: Wheezing, stridor, or difficulty breathing   Negative: [1] Hoarseness or cough AND [2] sudden onset following sting   Negative: [1] Tightness in the throat or chest AND [2] sudden onset following sting   Negative: [1] Swollen tongue or difficulty swallowing AND [2] sudden onset following sting   Negative: Sounds like a life-threatening emergency to the triager   Negative: Not a bee, wasp, hornet, or yellow jacket sting   Negative: [1] Widespread hives, itching or facial swelling AND [2] started within 2 hours of sting (Exception: only at site of sting)   Negative: [1] Vomiting or abdominal cramps AND [2] started within 2 hours of sting   Negative: Sting inside the mouth   Negative: Sting on eyeball (e.g., cornea)   Negative: Patient sounds very sick or weak to the triager   Negative: More than 50 stings   Negative: [1] Fever AND [2] red area   Negative: [1] Fever AND [2] area is very tender to touch   Negative: [1] Red streak or red line AND [2] length > 2 inches (5 cm)    Answer Assessment - Initial Assessment Questions  1. TYPE: "What type of sting was it?" (bee, yellow jacket, etc.)       Wasp.    2. ONSET: "When did it occur?"       Tuesday morning, 6 days ago.    3. LOCATION: "Where is the sting located?"  "How many stings?"      Left hand, wrist, finger.    4. SWELLING SIZE: "How big is the swelling?" (inches or centimeters)      Swelling down, but fingers stiff.     5. REDNESS: "Is the area red or pink?" If so, ask "What size is area of redness?" (inches or cm). "When did the redness start?"      Almost back to " "normal color now.    6. PAIN: "Is there any pain?" If so, ask: "How bad is it?"  (Scale 1-10; or mild, moderate, severe)      No pain now, but fingers stiff, and if I close my hand it stings badly.  Fingers numb.    7. ITCHING: "Is there any itching?" If so, ask: "How bad is it?"       No itching.    8. RESPIRATORY DISTRESS: "Describe your breathing."      No difficulty breathing.    9. PRIOR REACTIONS: "Have you had any severe allergic reactions to stings in the past?" if yes, ask: "What happened?"      Not to my knowledge.    10. OTHER SYMPTOMS: "Do you have any other symptoms?" (e.g., face or tongue swelling, new rash elsewhere, abdominal pain, vomiting)      Fingers numb.    11. PREGNANCY: "Is there any chance you are pregnant?" "When was your last menstrual period?"      N/a    Protocols used:  BEE - WASP - YELLOW JACKET STING-A-AH Claude, age 89 years, did have wasp stings (one wasp, multiple stings) to left hand, wrist, and finger (3 on finger alone, one on underside of wrist, and one on his hand).  States happened last Tuesday, 6 days ago, and still having numbness, tingling in fingers.  Hard to make a fist, tender to touch.  Per [facility]age protocol, recommend he be seen within 24 hours.  He wants to go to [facility] in [place] tomorrow morning.  Provided him with the hours, location, and phone number.  Message to Izzy Romo MD, pcp.  Please contact caller directly with any additional care advice.      "

## 2022-07-06 ENCOUNTER — OFFICE VISIT (OUTPATIENT)
Dept: PRIMARY CARE CLINIC | Facility: CLINIC | Age: 87
End: 2022-07-06
Payer: MEDICARE

## 2022-07-06 VITALS
HEIGHT: 72 IN | SYSTOLIC BLOOD PRESSURE: 138 MMHG | BODY MASS INDEX: 23.81 KG/M2 | DIASTOLIC BLOOD PRESSURE: 78 MMHG | WEIGHT: 175.81 LBS | HEART RATE: 64 BPM

## 2022-07-06 DIAGNOSIS — M79.642 HAND PAIN, LEFT: ICD-10-CM

## 2022-07-06 DIAGNOSIS — T63.461A WASP STING, ACCIDENTAL OR UNINTENTIONAL, INITIAL ENCOUNTER: Primary | ICD-10-CM

## 2022-07-06 PROCEDURE — 1125F AMNT PAIN NOTED PAIN PRSNT: CPT | Mod: CPTII,S$GLB,, | Performed by: NURSE PRACTITIONER

## 2022-07-06 PROCEDURE — 1160F PR REVIEW ALL MEDS BY PRESCRIBER/CLIN PHARMACIST DOCUMENTED: ICD-10-PCS | Mod: CPTII,S$GLB,, | Performed by: NURSE PRACTITIONER

## 2022-07-06 PROCEDURE — 99213 PR OFFICE/OUTPT VISIT, EST, LEVL III, 20-29 MIN: ICD-10-PCS | Mod: S$GLB,,, | Performed by: NURSE PRACTITIONER

## 2022-07-06 PROCEDURE — 1101F PR PT FALLS ASSESS DOC 0-1 FALLS W/OUT INJ PAST YR: ICD-10-PCS | Mod: CPTII,S$GLB,, | Performed by: NURSE PRACTITIONER

## 2022-07-06 PROCEDURE — 1101F PT FALLS ASSESS-DOCD LE1/YR: CPT | Mod: CPTII,S$GLB,, | Performed by: NURSE PRACTITIONER

## 2022-07-06 PROCEDURE — 1125F PR PAIN SEVERITY QUANTIFIED, PAIN PRESENT: ICD-10-PCS | Mod: CPTII,S$GLB,, | Performed by: NURSE PRACTITIONER

## 2022-07-06 PROCEDURE — 1159F MED LIST DOCD IN RCRD: CPT | Mod: CPTII,S$GLB,, | Performed by: NURSE PRACTITIONER

## 2022-07-06 PROCEDURE — 1159F PR MEDICATION LIST DOCUMENTED IN MEDICAL RECORD: ICD-10-PCS | Mod: CPTII,S$GLB,, | Performed by: NURSE PRACTITIONER

## 2022-07-06 PROCEDURE — 3288F PR FALLS RISK ASSESSMENT DOCUMENTED: ICD-10-PCS | Mod: CPTII,S$GLB,, | Performed by: NURSE PRACTITIONER

## 2022-07-06 PROCEDURE — 99999 PR PBB SHADOW E&M-EST. PATIENT-LVL IV: ICD-10-PCS | Mod: PBBFAC,,, | Performed by: NURSE PRACTITIONER

## 2022-07-06 PROCEDURE — 3288F FALL RISK ASSESSMENT DOCD: CPT | Mod: CPTII,S$GLB,, | Performed by: NURSE PRACTITIONER

## 2022-07-06 PROCEDURE — 1160F RVW MEDS BY RX/DR IN RCRD: CPT | Mod: CPTII,S$GLB,, | Performed by: NURSE PRACTITIONER

## 2022-07-06 PROCEDURE — 99999 PR PBB SHADOW E&M-EST. PATIENT-LVL IV: CPT | Mod: PBBFAC,,, | Performed by: NURSE PRACTITIONER

## 2022-07-06 PROCEDURE — 99213 OFFICE O/P EST LOW 20 MIN: CPT | Mod: S$GLB,,, | Performed by: NURSE PRACTITIONER

## 2022-07-06 RX ORDER — ACETAMINOPHEN AND CODEINE PHOSPHATE 300; 30 MG/1; MG/1
1 TABLET ORAL EVERY 8 HOURS PRN
Qty: 20 TABLET | Refills: 0 | Status: SHIPPED | OUTPATIENT
Start: 2022-07-06 | End: 2022-10-14 | Stop reason: ALTCHOICE

## 2022-07-06 RX ORDER — CEPHALEXIN 500 MG/1
500 TABLET ORAL 2 TIMES DAILY
Qty: 14 TABLET | Refills: 0 | Status: SHIPPED | OUTPATIENT
Start: 2022-07-06 | End: 2022-07-13

## 2022-07-06 RX ORDER — PREDNISONE 20 MG/1
40 TABLET ORAL
COMMUNITY
Start: 2022-07-04 | End: 2022-07-09

## 2022-07-06 NOTE — PATIENT INSTRUCTIONS
Antibiotic as prescribed.  Continue ice and elevation.  Tylenol #3 for pain.   Elevate when possible.  Let us know if symptoms persist.

## 2022-07-06 NOTE — PROGRESS NOTES
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 No acute fractures noted though.   Izzy Romo MD

## 2022-07-14 ENCOUNTER — OFFICE VISIT (OUTPATIENT)
Dept: PRIMARY CARE CLINIC | Facility: CLINIC | Age: 87
End: 2022-07-14
Payer: MEDICARE

## 2022-07-14 VITALS
DIASTOLIC BLOOD PRESSURE: 78 MMHG | BODY MASS INDEX: 23.59 KG/M2 | WEIGHT: 173.94 LBS | SYSTOLIC BLOOD PRESSURE: 146 MMHG | HEART RATE: 64 BPM | TEMPERATURE: 100 F

## 2022-07-14 DIAGNOSIS — Z76.89 ENCOUNTER TO ESTABLISH CARE: Primary | ICD-10-CM

## 2022-07-14 DIAGNOSIS — T63.461D WASP STING, ACCIDENTAL OR UNINTENTIONAL, SUBSEQUENT ENCOUNTER: ICD-10-CM

## 2022-07-14 DIAGNOSIS — Z09 HOSPITAL DISCHARGE FOLLOW-UP: ICD-10-CM

## 2022-07-14 DIAGNOSIS — M79.2 NEUROPATHIC PAIN OF LEFT HAND: ICD-10-CM

## 2022-07-14 DIAGNOSIS — M79.89 SWELLING OF LEFT HAND: ICD-10-CM

## 2022-07-14 PROCEDURE — 99999 PR PBB SHADOW E&M-EST. PATIENT-LVL V: ICD-10-PCS | Mod: PBBFAC,,, | Performed by: FAMILY MEDICINE

## 2022-07-14 PROCEDURE — 1159F PR MEDICATION LIST DOCUMENTED IN MEDICAL RECORD: ICD-10-PCS | Mod: CPTII,S$GLB,, | Performed by: FAMILY MEDICINE

## 2022-07-14 PROCEDURE — 1101F PT FALLS ASSESS-DOCD LE1/YR: CPT | Mod: CPTII,S$GLB,, | Performed by: FAMILY MEDICINE

## 2022-07-14 PROCEDURE — 3288F FALL RISK ASSESSMENT DOCD: CPT | Mod: CPTII,S$GLB,, | Performed by: FAMILY MEDICINE

## 2022-07-14 PROCEDURE — 99215 OFFICE O/P EST HI 40 MIN: CPT | Mod: S$GLB,,, | Performed by: FAMILY MEDICINE

## 2022-07-14 PROCEDURE — 1160F RVW MEDS BY RX/DR IN RCRD: CPT | Mod: CPTII,S$GLB,, | Performed by: FAMILY MEDICINE

## 2022-07-14 PROCEDURE — 1101F PR PT FALLS ASSESS DOC 0-1 FALLS W/OUT INJ PAST YR: ICD-10-PCS | Mod: CPTII,S$GLB,, | Performed by: FAMILY MEDICINE

## 2022-07-14 PROCEDURE — 1159F MED LIST DOCD IN RCRD: CPT | Mod: CPTII,S$GLB,, | Performed by: FAMILY MEDICINE

## 2022-07-14 PROCEDURE — 3288F PR FALLS RISK ASSESSMENT DOCUMENTED: ICD-10-PCS | Mod: CPTII,S$GLB,, | Performed by: FAMILY MEDICINE

## 2022-07-14 PROCEDURE — 99215 PR OFFICE/OUTPT VISIT, EST, LEVL V, 40-54 MIN: ICD-10-PCS | Mod: S$GLB,,, | Performed by: FAMILY MEDICINE

## 2022-07-14 PROCEDURE — 1160F PR REVIEW ALL MEDS BY PRESCRIBER/CLIN PHARMACIST DOCUMENTED: ICD-10-PCS | Mod: CPTII,S$GLB,, | Performed by: FAMILY MEDICINE

## 2022-07-14 PROCEDURE — 99999 PR PBB SHADOW E&M-EST. PATIENT-LVL V: CPT | Mod: PBBFAC,,, | Performed by: FAMILY MEDICINE

## 2022-07-14 NOTE — PATIENT INSTRUCTIONS
The hand does look like it is improving, although there is still some swelling present.    First, if you can, try removing your wedding ring and moving it to the other hand until all of your symptoms resolve.    Next, try applying ice or cold compress to the area for 5-10 minutes at a time, 2-3 times per day.  This can help with some of the inflammation.    Continue taking your Lasix once a day, for the next 5 days (through Monday).  This will help the swelling in both the hand, and the legs.    Try taking an OTC antihistamine, such as Claritin or Zyrtec each morning.  Also take a half tablet of Benadryl at bedtime.  These can also help with the swelling.    If you get no significant improvement, or if it starts worsening, the next step would be to follow up with Dr. Michael Hernandez, the hand specialist.

## 2022-07-19 ENCOUNTER — OFFICE VISIT (OUTPATIENT)
Dept: OPHTHALMOLOGY | Facility: CLINIC | Age: 87
End: 2022-07-19
Payer: MEDICARE

## 2022-07-19 DIAGNOSIS — B96.89 BACTERIAL CONJUNCTIVITIS OF BOTH EYES: Primary | ICD-10-CM

## 2022-07-19 DIAGNOSIS — H16.223 KERATOCONJUNCTIVITIS SICCA DUE TO DECREASED TEAR PRODUCTION, BILATERAL: ICD-10-CM

## 2022-07-19 DIAGNOSIS — H40.1122 PRIMARY OPEN ANGLE GLAUCOMA (POAG) OF LEFT EYE, MODERATE STAGE: ICD-10-CM

## 2022-07-19 DIAGNOSIS — H10.9 BACTERIAL CONJUNCTIVITIS OF BOTH EYES: Primary | ICD-10-CM

## 2022-07-19 PROCEDURE — 99213 OFFICE O/P EST LOW 20 MIN: CPT | Mod: S$GLB,,, | Performed by: OPTOMETRIST

## 2022-07-19 PROCEDURE — 1159F PR MEDICATION LIST DOCUMENTED IN MEDICAL RECORD: ICD-10-PCS | Mod: CPTII,S$GLB,, | Performed by: OPTOMETRIST

## 2022-07-19 PROCEDURE — 99213 PR OFFICE/OUTPT VISIT, EST, LEVL III, 20-29 MIN: ICD-10-PCS | Mod: S$GLB,,, | Performed by: OPTOMETRIST

## 2022-07-19 PROCEDURE — 99999 PR PBB SHADOW E&M-EST. PATIENT-LVL III: ICD-10-PCS | Mod: PBBFAC,,, | Performed by: OPTOMETRIST

## 2022-07-19 PROCEDURE — 1159F MED LIST DOCD IN RCRD: CPT | Mod: CPTII,S$GLB,, | Performed by: OPTOMETRIST

## 2022-07-19 PROCEDURE — 99999 PR PBB SHADOW E&M-EST. PATIENT-LVL III: CPT | Mod: PBBFAC,,, | Performed by: OPTOMETRIST

## 2022-07-19 RX ORDER — MOXIFLOXACIN 5 MG/ML
1 SOLUTION/ DROPS OPHTHALMIC 4 TIMES DAILY
Qty: 1.8667 ML | Refills: 0 | Status: SHIPPED | OUTPATIENT
Start: 2022-07-19 | End: 2022-07-26

## 2022-07-19 RX ORDER — CEPHALEXIN 500 MG/1
500 CAPSULE ORAL 2 TIMES DAILY
COMMUNITY
Start: 2022-07-06 | End: 2022-10-14 | Stop reason: ALTCHOICE

## 2022-07-19 NOTE — PROGRESS NOTES
HPI     Patient here for eye pain     Laterality: OU  Pain Scale:  6  Onset:   3 days   Discharge:   Yes  A.M. Matting:  Yes  Itch:   Yes  Redness:   Yes  Photophobia:   Yes  Foreign body sensation:   Yes  Deep pain:   No  Previous occurrence:   yes  Drops:   Celluvisc QID OU and Clear eyes redness relief prn   Contact lens wear? No       Last edited by Sajan Brown, OD on 7/19/2022  8:15 AM. (History)            Assessment /Plan     For exam results, see Encounter Report.    Bacterial conjunctivitis of both eyes  -     moxifloxacin (VIGAMOX) 0.5 % ophthalmic solution; Place 1 drop into both eyes 4 (four) times daily. for 7 days  Dispense: 1.8667 mL; Refill: 0  Start Moxifloxacin qid x 7 days. RTC 3 days for f/u sooner if any changes to vision or worsening symptoms.   Keratoconjunctivitis sicca due to decreased tear production, bilateral  Continue refresh Celluvisc qid with warm compresses and lid scrubs.   Primary open angle glaucoma (POAG) of left eye, moderate stage  IOP at [facility] today, continue Travatan at bedtime. Overdue for HVF 24-2, RTC for testing once #1 resolves    RTC 3 days for f/u sooner if any changes to vision or worsening symptoms

## 2022-07-21 NOTE — PROGRESS NOTES
"Hima Cannon MD    [facility]      [address] [place] [address]      Phone: [phone]      Fax: [phone]                  Office Visit  07/14/2022        Subjective      HPI:  Claude L Taylor is a 89 y.o. male presents today in clinic for "Establish Care  ."     89-year-old gentleman presents today to establish care, discuss a couple of issues.    His wife, Ms. Quiroga, is present with him today.  She provides portion of the history.    Patient states that he has longstanding history of dry eyes.  Has been fighting this for 2+ years.  Currently sees Dr. Trejo, ophthalmology, and Dr. Sampson, glaucoma specialist, regularly.  They have him on different eyedrops.    Also states that he was stung by a wasp on his left hand about 10 days ago.  He was stung approximately 7 times.  Twice on the wrist, 3 times on his palm, twice on his finger (middle).  Initially, he had some mild swelling and tingling.  He went to the ER for evaluation.  They put him on prednisone for 5 days, gave him some Keflex.  Also some Tylenol 3. He has completed the Keflex.  Swelling has gone down, but still the hand is sensitive to the touch, almost staff.  Occasionally, he will get a shocking type pain that will last about 10 seconds, then self-resolved.  No redness.    PMH:  CAD, AFib, BPH, chronic pains.  PSH:  Pacemaker, heart ablation/heart valve repair/heart catheterization, CABG, tonsils, multiple MSK (back, foot), gallbladder  Allergies:  Oxycodone makes him very emotional  Social:  Retired.  .  T:  Denies  A:  Occasionally  D:  Denies    Exercise:  No regular exercise program.  Has rolling walker and scooter to help get around.    Cardiology:  Dr. Barrios  Ophthalmology:  Dr. Brown, Dr. Sampson      The following were updated and reviewed by myself in the chart: medications, past medical history, past surgical history, family history, social history, and allergies.     Medications:  Current " Outpatient Medications on File Prior to Visit   Medication Sig Dispense Refill    acetaminophen-codeine 300-30mg (TYLENOL #3) 300-30 mg Tab Take 1 tablet by mouth every 8 (eight) hours as needed (pain). 20 tablet 0    COVID-19 vac, rachna,Pfizer,,PF, (PFIZER COVID-19 RACHNA VACCN,PF,) 30 mcg/0.3 mL injection Inject into the muscle. 0.3 mL 0    cyanocobalamin, vitamin B-12, 1,000 mcg/mL Drop Place 5,000 mcg under the tongue once daily. 120 mL 3    cycloSPORINE (RESTASIS) 0.05 % ophthalmic emulsion Place 1 drop into both eyes 2 (two) times daily. 180 each 3    diclofenac sodium (VOLTAREN) 1 % Gel Apply 2 g topically daily as needed (foot pain). 100 g 2    eyelid cleanser combination 1 (STERILID) Foam Apply 1 application topically 2 (two) times a day. 1 each PRN    fluorometholone 0.1% (FML) 0.1 % DrpS Place 1 drop into both eyes 2 (two) times daily. 10 mL 3    furosemide (LASIX) 20 MG tablet Take 1 tablet (20 mg total) by mouth daily as needed. 180 tablet 3    gabapentin (NEURONTIN) 100 MG capsule Take 2 capsules in the morning, 2 capsules at  mid-day, and 3 capsule by mouth at bedtime for neuropathy (Patient taking differently: Take 2 capsules in the morning, 2 capsules at  mid-day, and 3 capsule by mouth at bedtime for neuropathy) 630 capsule 3    ketoconazole (NIZORAL) 2 % shampoo Shampoo/wash daily or as needed for flares. 120 mL 6    latanoprost 0.005 % ophthalmic solution Instill 1 drop into the left eye every evening. 2.5 mL 4    peg 400-propylene glycol (SYSTANE, PROPYLENE GLYCOL,) 0.4-0.3 % Drop Apply to eye.      propylene glycol/peg 400/PF (SYSTANE, PF, OPHT) Apply to eye.      tamsulosin (FLOMAX) 0.4 mg Cap Take 2 capsules (0.8 mg total) by mouth every evening. For prostate and urine flow 180 capsule 3    travoprost (TRAVATAN Z) 0.004 % ophthalmic solution Place 1 drop into both eyes every evening. 2.5 mL 6    warfarin (COUMADIN) 5 MG tablet Take 1 tablet by mouth every day or as directed by  coumadin clinic 90 tablet 1    oxybutynin (DITROPAN) 5 MG Tab Take 1 tablet (5 mg total) by mouth once daily. For overactive bladder (Patient not taking: No sig reported) 90 tablet 3    tafluprost, PF, (ZIOPTAN, PF,) 0.0015 % Dpet Place 1 drop into both eyes every evening. 30 each 6     No current facility-administered medications on file prior to visit.        PMHx:  Past Medical History:   Diagnosis Date    *Atrial fibrillation     pacer Dr Barrios    Anticoagulant long-term use     Cancer     skin    Cervical neck pain with evidence of disc disease     Colon polyps 12/14/2015    Coronary artery disease     Depression     Diverticulosis     Hypertension     Kidney stone     Skin cancer       Patient Active Problem List    Diagnosis Date Noted    Positive SHOAIB (antinuclear antibody) 05/02/2022    Keratoconjunctivitis sicca due to decreased tear production, bilateral 05/02/2022    Primary osteoarthritis involving multiple joints 05/02/2022    Dependence on other enabling machines and devices 11/09/2021    Thrombocytopenia, unspecified 05/03/2021    Atrial fibrillation, chronic 05/03/2021    Osteoporosis 05/03/2021    Sacroiliitis 09/25/2019    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    SOB (shortness of breath) on exertion 03/27/2019    Benign prostatic hyperplasia with weak urinary stream 03/27/2019    Cardiac pacemaker 07/31/2018    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    Nonrheumatic mitral (valve) insufficiency 07/31/2018    Peripheral vascular disease 07/31/2018    Hx of CABG 05/23/2018    Spinal stenosis of lumbar region at multiple levels 05/23/2018    DDD (degenerative disc disease), lumbosacral 05/23/2018    Proximal muscle weakness 05/23/2018    OAB (overactive bladder) 05/23/2018    Coronary artery disease involving native coronary artery of native heart without angina pectoris 10/05/2017    Permanent atrial fibrillation 10/05/2017    Anticoagulated on Coumadin 10/05/2017    B12  deficiency due to diet 10/05/2017    Primary osteoarthritis of both knees 07/14/2017    Chronic constipation 09/26/2016    Anismus 12/14/2015    Diverticulosis of large intestine without hemorrhage 12/14/2015    Altered bowel habits 12/13/2015    Chronic back pain 09/15/2015    Osteoarthritis 11/18/2014    Long term (current) use of anticoagulants 11/06/2014    Asthma in adult 01/14/2014    Coronary artery disease involving native coronary artery of native heart with angina pectoris 12/10/2013    Depression         PSHx:  Past Surgical History:   Procedure Laterality Date    back fusion      CARDIAC PACEMAKER PLACEMENT      CARDIAC VALVE REPLACEMENT      CHOLECYSTECTOMY      COLONOSCOPY  3/22/2013    COLONOSCOPY N/A 12/14/2015    Procedure: COLONOSCOPY;  Surgeon: Gonzalo Gorman MD;  [facility];  Service: Endoscopy;  Laterality: N/A;    CORONARY ARTERY BYPASS GRAFT      ELBOW BURSA SURGERY      left    EYE SURGERY      FOOT SURGERY      MITRAL VALVE REPLACEMENT      REPLACEMENT OF PACEMAKER  09/2019    SHOULDER SURGERY      SKIN CANCER EXCISION  01/2018/   04/2018 Dr.Thorla Jones    lower left leg     SPINE SURGERY  3 surgeries    TONSILLECTOMY          FHx:  Family History   Problem Relation Age of Onset    Cancer Father     Arthritis Father     Colon cancer Neg Hx         Social:  Social History     Socioeconomic History    Marital status:      Spouse name: marguerite Barron   Tobacco Use    Smoking status: Never Smoker    Smokeless tobacco: Never Used    Tobacco comment: never a smoker   Substance and Sexual Activity    Alcohol use: Not Currently     Alcohol/week: 2.0 standard drinks     Types: 1 Glasses of wine, 1 Cans of beer per week     Comment: drinks above only with meals ocassionaly    Drug use: Never    Sexual activity: Not Currently     Partners: Female     Comment: too old at 89        Allergies:  Review of patient's allergies indicates:   Allergen  "Reactions    Oxycodone      "it's mental/emotional        ROS:  Review of Systems   Constitutional: Negative for activity change, appetite change, chills and fever.   HENT: Negative for congestion, postnasal drip, rhinorrhea, sore throat and trouble swallowing.    Respiratory: Negative for cough and shortness of breath.    Cardiovascular: Negative for chest pain and palpitations.   Gastrointestinal: Negative for abdominal pain, constipation, diarrhea, nausea and vomiting.   Genitourinary: Negative for difficulty urinating.   Musculoskeletal: Positive for arthralgias and joint swelling.   Skin: Positive for color change.   Neurological: Negative for headaches.   All other systems reviewed and are negative.         Objective      BP (!) 146/78   Pulse 64   Temp 99.8 °F (37.7 °C)   Wt 78.9 kg (173 lb 15.1 oz)   BMI 23.59 kg/m²   Ht Readings from Last 3 Encounters:   07/06/22 6' (1.829 m)   05/02/22 6' (1.829 m)   02/08/22 6' (1.829 m)     Wt Readings from Last 3 Encounters:   07/14/22 78.9 kg (173 lb 15.1 oz)   07/06/22 79.8 kg (175 lb 13.1 oz)   05/03/22 77.9 kg (171 lb 11.8 oz)       PHYSICAL EXAM:  Physical Exam  Vitals and nursing note reviewed.   Constitutional:       General: He is not in acute distress.     Appearance: Normal appearance.   HENT:      Head: Normocephalic and atraumatic.      Right Ear: Tympanic membrane, ear canal and external ear normal.      Left Ear: Tympanic membrane, ear canal and external ear normal.      Nose: Nose normal. No congestion or rhinorrhea.      Mouth/Throat:      Mouth: Mucous membranes are moist.      Pharynx: Oropharynx is clear. No oropharyngeal exudate or posterior oropharyngeal erythema.   Eyes:      Extraocular Movements: Extraocular movements intact.      Conjunctiva/sclera: Conjunctivae normal.      Pupils: Pupils are equal, round, and reactive to light.   Cardiovascular:      Rate and Rhythm: Normal rate and regular rhythm.   Pulmonary:      Effort: Pulmonary " effort is normal.      Breath sounds: No wheezing, rhonchi or rales.   Musculoskeletal:         General: Normal range of motion.      Left hand: Swelling present. No tenderness. Normal range of motion.      Cervical back: Normal range of motion.   Lymphadenopathy:      Cervical: No cervical adenopathy.   Skin:     General: Skin is warm and dry.   Neurological:      General: No focal deficit present.      Mental Status: He is alert.              LABS / IMAGING:  Recent Results (from the past 4368 hour(s))   CBC Auto Differential    Collection Time: 02/08/22 12:00 PM   Result Value Ref Range    WBC 5.55 3.90 - 12.70 K/uL    RBC 4.22 (L) 4.60 - 6.20 M/uL    Hemoglobin 13.0 (L) 14.0 - 18.0 g/dL    Hematocrit 40.3 40.0 - 54.0 %    MCV 96 82 - 98 fL    MCH 30.8 27.0 - 31.0 pg    MCHC 32.3 32.0 - 36.0 g/dL    RDW 14.7 (H) 11.5 - 14.5 %    Platelets 169 150 - 450 K/uL    MPV 11.5 9.2 - 12.9 fL    Immature Granulocytes 0.2 0.0 - 0.5 %    Gran # (ANC) 3.0 1.8 - 7.7 K/uL    Immature Grans (Abs) 0.01 0.00 - 0.04 K/uL    Lymph # 1.4 1.0 - 4.8 K/uL    Mono # 0.6 0.3 - 1.0 K/uL    Eos # 0.5 0.0 - 0.5 K/uL    Baso # 0.09 0.00 - 0.20 K/uL    nRBC 0 0 /100 WBC    Gran % 53.7 38.0 - 73.0 %    Lymph % 25.0 18.0 - 48.0 %    Mono % 11.2 4.0 - 15.0 %    Eosinophil % 8.3 (H) 0.0 - 8.0 %    Basophil % 1.6 0.0 - 1.9 %    Differential Method Automated    Comprehensive Metabolic Panel    Collection Time: 02/08/22 12:00 PM   Result Value Ref Range    Sodium 135 (L) 136 - 145 mmol/L    Potassium 4.5 3.5 - 5.1 mmol/L    Chloride 104 95 - 110 mmol/L    CO2 27 23 - 29 mmol/L    Glucose 77 70 - 110 mg/dL    BUN 11 10 - 30 mg/dL    Creatinine 0.9 0.5 - 1.4 mg/dL    Calcium 9.2 8.7 - 10.5 mg/dL    Total Protein 6.5 6.0 - 8.4 g/dL    Albumin 3.6 3.5 - 5.2 g/dL    Total Bilirubin 1.2 (H) 0.1 - 1.0 mg/dL    Alkaline Phosphatase 72 55 - 135 U/L    AST 16 10 - 40 U/L    ALT 7 (L) 10 - 44 U/L    Anion Gap 4 (L) 8 - 16 mmol/L    eGFR if African American  >60.0 >60 mL/min/1.73 m^2    eGFR if non African American >60.0 >60 mL/min/1.73 m^2   SHOAIB Screen w/Reflex    Collection Time: 02/08/22 12:00 PM   Result Value Ref Range    SHOAIB Screen Positive (A) Negative <1:80   SHOAIB Pattern 1    Collection Time: 02/08/22 12:00 PM   Result Value Ref Range    SHOAIB PATTERN 1 Speckled    SHOAIB Profile    Collection Time: 02/08/22 12:00 PM   Result Value Ref Range    Anti Sm Antibody 0.14 0.00 - 0.99 Ratio    Anti-Sm Interpretation Negative Negative    Anti-SSA Antibody 0.10 0.00 - 0.99 Ratio    Anti-SSA Interpretation Negative Negative    Anti-SSB Antibody 0.10 0.00 - 0.99 Ratio    Anti-SSB Interpretation Negative Negative    ds DNA Ab Negative 1:10 Negative 1:10    Anti Sm/RNP Antibody 0.14 0.00 - 0.99 Ratio    Anti-Sm/RNP Interpretation Negative Negative   SHOAIB Titer 1    Collection Time: 02/08/22 12:00 PM   Result Value Ref Range    SHOAIB Titer 1 1:80          Assessment    1. Encounter to establish care    2. Hospital discharge follow-up    3. Swelling of left hand    4. Neuropathic pain of left hand    5. Wasp sting, accidental or unintentional, subsequent encounter          Plan    Claude was seen today for establish care.    Diagnoses and all orders for this visit:    Encounter to establish care    Hospital discharge follow-up    Swelling of left hand  -     Ambulatory referral/consult to Orthopedics; Future    Neuropathic pain of left hand  -     Ambulatory referral/consult to Orthopedics; Future    Wasp sting, accidental or unintentional, subsequent encounter  -     Ambulatory referral/consult to Orthopedics; Future      Hand looks to be improving, but still slightly swollen.  Recommended ice, cold compress to the area.  Continue Lasix, Daily, for 5 days to help with swelling in hands, feet.  Also recommended OTC antihistamine in the morning, Benadryl at bedtime.  If no improvement, will send referral to orthopedic hand specialist for re-evaluation.      FOLLOW-UP:  Follow up in 3  months (on 10/14/2022) for check up.    I spent a total of 50 minutes face to face and non-face to face on the date of this visit.This includes time preparing to see the patient (eg, review of tests, notes), obtaining and/or reviewing additional history from an independent historian and/or outside medical records, documenting clinical information in the electronic health record, independently interpreting results and/or communicating results to the patient/family/caregiver, or care coordinator.    Signed by:  Hima Cannon MD

## 2022-07-22 ENCOUNTER — OFFICE VISIT (OUTPATIENT)
Dept: OPHTHALMOLOGY | Facility: CLINIC | Age: 87
End: 2022-07-22
Payer: MEDICARE

## 2022-07-22 DIAGNOSIS — H10.9 BACTERIAL CONJUNCTIVITIS OF BOTH EYES: Primary | ICD-10-CM

## 2022-07-22 DIAGNOSIS — B96.89 BACTERIAL CONJUNCTIVITIS OF BOTH EYES: Primary | ICD-10-CM

## 2022-07-22 DIAGNOSIS — H02.886 MEIBOMIAN GLAND DYSFUNCTION (MGD) OF BOTH EYES: ICD-10-CM

## 2022-07-22 DIAGNOSIS — H02.883 MEIBOMIAN GLAND DYSFUNCTION (MGD) OF BOTH EYES: ICD-10-CM

## 2022-07-22 DIAGNOSIS — H40.1122 PRIMARY OPEN ANGLE GLAUCOMA (POAG) OF LEFT EYE, MODERATE STAGE: ICD-10-CM

## 2022-07-22 DIAGNOSIS — H40.1132 PRIMARY OPEN ANGLE GLAUCOMA (POAG) OF BOTH EYES, MODERATE STAGE: ICD-10-CM

## 2022-07-22 DIAGNOSIS — H16.223 KERATOCONJUNCTIVITIS SICCA DUE TO DECREASED TEAR PRODUCTION, BILATERAL: ICD-10-CM

## 2022-07-22 PROCEDURE — 99214 PR OFFICE/OUTPT VISIT, EST, LEVL IV, 30-39 MIN: ICD-10-PCS | Mod: S$GLB,,, | Performed by: OPTOMETRIST

## 2022-07-22 PROCEDURE — 1159F PR MEDICATION LIST DOCUMENTED IN MEDICAL RECORD: ICD-10-PCS | Mod: CPTII,S$GLB,, | Performed by: OPTOMETRIST

## 2022-07-22 PROCEDURE — 99214 OFFICE O/P EST MOD 30 MIN: CPT | Mod: S$GLB,,, | Performed by: OPTOMETRIST

## 2022-07-22 PROCEDURE — 99999 PR PBB SHADOW E&M-EST. PATIENT-LVL III: ICD-10-PCS | Mod: PBBFAC,,, | Performed by: OPTOMETRIST

## 2022-07-22 PROCEDURE — 87070 CULTURE OTHR SPECIMN AEROBIC: CPT | Performed by: OPTOMETRIST

## 2022-07-22 PROCEDURE — 99999 PR PBB SHADOW E&M-EST. PATIENT-LVL III: CPT | Mod: PBBFAC,,, | Performed by: OPTOMETRIST

## 2022-07-22 PROCEDURE — 1159F MED LIST DOCD IN RCRD: CPT | Mod: CPTII,S$GLB,, | Performed by: OPTOMETRIST

## 2022-07-22 RX ORDER — POLYMYXIN B SULFATE AND TRIMETHOPRIM 1; 10000 MG/ML; [USP'U]/ML
1 SOLUTION OPHTHALMIC EVERY 6 HOURS
Qty: 10 ML | Refills: 1 | Status: SHIPPED | OUTPATIENT
Start: 2022-07-22 | End: 2022-10-14 | Stop reason: ALTCHOICE

## 2022-07-22 NOTE — PATIENT INSTRUCTIONS
Vigamox every 1 hour while awake right eye   Polytrim every 6 hours right eye  Travatan at bedtime both eyes  Refresh Celluvisc as much as possible both eyes 4-6 times daily

## 2022-07-22 NOTE — PROGRESS NOTES
HPI     Eye Problem     Comments: Patient reports for 3 day follow up, dx c bacterial conj. Using   vigamox QID OU. States he has a lot of trouble with glare, states he wears   sunglasses while indoors. States he has not noticed improvement with his   symptoms since beginning Vigamox.              Comments     MGM patient 1999     1.PCIOL OD 6-1998 (Dr. Green)   PCIOL OS 2-1999 (Dr. Sampson)   2. POAG   3. Dry eyes       Refresh Celluvisc QID OU   Restasis BID OU (d/c'd 5/1/22)  Travatan QHS OU            Last edited by Brady Navarro on 7/22/2022  9:06 AM. (History)            Assessment /Plan     For exam results, see Encounter Report.    Bacterial conjunctivitis of both eyes  -     polymyxin B sulf-trimethoprim (POLYTRIM) 10,000 unit- 1 mg/mL Drop; Place 1 drop into both eyes every 6 (six) hours.  Dispense: 10 mL; Refill: 1  -     Aerobic culture; Future; Expected date: 07/22/2022    Little improvement on exam today. Culture taken OD.   Increase Vigamox q1hr while awake OD only, begin Polytrim q6hrs OD    Keratoconjunctivitis sicca due to decreased tear production, bilateral  As above     Meibomian gland dysfunction (MGD) of both eyes  As above.     Primary open angle glaucoma (POAG) of left eye, moderate stage  Due for follow up with MG for glaucoma follow up with [facility] 24-2, will refer back once conjunctivitis has cleared   IOP doing well today, continue Travatan  qd OU    Primary open angle glaucoma (POAG) of both eyes, moderate stage  As above.        Offered pt appointment on Monday, patient defers and wishes to see DKT on Tuesday  Asked pt contact me if symptoms worsen over the weekend

## 2022-07-25 LAB — BACTERIA SPEC AEROBE CULT: NORMAL

## 2022-07-26 ENCOUNTER — OFFICE VISIT (OUTPATIENT)
Dept: OPHTHALMOLOGY | Facility: CLINIC | Age: 87
End: 2022-07-26
Payer: MEDICARE

## 2022-07-26 DIAGNOSIS — H16.223 KERATOCONJUNCTIVITIS SICCA DUE TO DECREASED TEAR PRODUCTION, BILATERAL: Primary | ICD-10-CM

## 2022-07-26 DIAGNOSIS — H10.9 BACTERIAL CONJUNCTIVITIS OF BOTH EYES: ICD-10-CM

## 2022-07-26 DIAGNOSIS — B96.89 BACTERIAL CONJUNCTIVITIS OF BOTH EYES: ICD-10-CM

## 2022-07-26 DIAGNOSIS — H40.1132 PRIMARY OPEN ANGLE GLAUCOMA (POAG) OF BOTH EYES, MODERATE STAGE: ICD-10-CM

## 2022-07-26 PROCEDURE — 1126F AMNT PAIN NOTED NONE PRSNT: CPT | Mod: CPTII,S$GLB,, | Performed by: OPTOMETRIST

## 2022-07-26 PROCEDURE — 1159F PR MEDICATION LIST DOCUMENTED IN MEDICAL RECORD: ICD-10-PCS | Mod: CPTII,S$GLB,, | Performed by: OPTOMETRIST

## 2022-07-26 PROCEDURE — 99213 PR OFFICE/OUTPT VISIT, EST, LEVL III, 20-29 MIN: ICD-10-PCS | Mod: S$GLB,,, | Performed by: OPTOMETRIST

## 2022-07-26 PROCEDURE — 99999 PR PBB SHADOW E&M-EST. PATIENT-LVL III: CPT | Mod: PBBFAC,,, | Performed by: OPTOMETRIST

## 2022-07-26 PROCEDURE — 1126F PR PAIN SEVERITY QUANTIFIED, NO PAIN PRESENT: ICD-10-PCS | Mod: CPTII,S$GLB,, | Performed by: OPTOMETRIST

## 2022-07-26 PROCEDURE — 99213 OFFICE O/P EST LOW 20 MIN: CPT | Mod: S$GLB,,, | Performed by: OPTOMETRIST

## 2022-07-26 PROCEDURE — 1159F MED LIST DOCD IN RCRD: CPT | Mod: CPTII,S$GLB,, | Performed by: OPTOMETRIST

## 2022-07-26 PROCEDURE — 99999 PR PBB SHADOW E&M-EST. PATIENT-LVL III: ICD-10-PCS | Mod: PBBFAC,,, | Performed by: OPTOMETRIST

## 2022-07-26 RX ORDER — TRAVOPROST OPHTHALMIC SOLUTION 0.04 MG/ML
1 SOLUTION OPHTHALMIC NIGHTLY
Qty: 2.5 ML | Refills: 6 | Status: SHIPPED | OUTPATIENT
Start: 2022-07-26 | End: 2022-10-14 | Stop reason: ALTCHOICE

## 2022-07-26 NOTE — Clinical Note
Saul Peralta, Mr Barrons conjunctivitus resolved, can we please get him with Dr LOBO for that HVF and glc f/u.   Thanks DT

## 2022-07-26 NOTE — PROGRESS NOTES
HPI     Follow-up     Comments: Patient here for 4 day follow-up               Comments     Patient states VA is stable. Patient states he is still having light   sensitivity. Patient states pain is about the same. No other ocular   complaints at this time.     Refresh Celluvisc QID OU   Restasis BID OU (d/c'd 5/1/22)  Travatan QHS OU            Last edited by Malka Leonardo MA on 7/26/2022  9:17 AM. (History)            Assessment /Plan     For exam results, see Encounter Report.    Keratoconjunctivitis sicca due to decreased tear production, bilateral  Continue refresh celluvisc 4 times daily OU.    Bacterial conjunctivitis of both eyes  Resolved, stop Polytrim and Vigamox today. Culture obtained Friday 4/22 came back normal. Continue refresh celluvisc 4 times daily OU.   Primary open angle glaucoma (POAG) of both eyes, moderate stage  -     travoprost (TRAVATAN Z) 0.004 % ophthalmic solution; Place 1 drop into both eyes every evening.  Dispense: 2.5 mL; Refill: 6  Continue travatan Z at bedtime. Overdue for HVF with Dr LOBO, RTC with Dr LOBO for HVF 24-2 and IOP check    RTC with LASHELL for HVF 24-2 and IOP check, sooner if symptoms worsen or changes to vision.

## 2022-07-26 NOTE — PATIENT INSTRUCTIONS
Stop Polytrim and Vigamox today  Continue Travatan at bedtime both eyes   Continue Refresh Celluvisc 4 times both eyes

## 2022-07-28 NOTE — PROGRESS NOTES
Disclaimer:  This note is prepared using voice recognition software and as such is likely to have errors and has not been proof read. Please contact me for questions.    Subjective:      Patient ID: Claude L Taylor is a 89 y.o. male.    Chief Complaint: Insect Bite (1 week ago - pain and stinging feeling in hand still )    Stung by a wasp multiple times on the left hand. Went to ER and received prednisone. Still having shooting nerve pains and redness and swelling to some sites. No fever. No red streaking. Normal strength. Tetanus is UTD. He is right hand dominant.       Review of Systems   Constitutional: Negative for fever.   Musculoskeletal: Positive for arthralgias (left hand).   Skin: Positive for color change (left hand) and wound (wasp stings to left hand).       Objective:   /78   Pulse 64   Ht 6' (1.829 m)   Wt 79.8 kg (175 lb 13.1 oz)   BMI 23.85 kg/m²   Physical Exam  Vitals and nursing note reviewed.   Constitutional:       General: He is not in acute distress.     Appearance: Normal appearance.   HENT:      Head: Normocephalic and atraumatic.      Right Ear: Tympanic membrane, ear canal and external ear normal.      Left Ear: Tympanic membrane, ear canal and external ear normal.      Nose: Nose normal. No congestion or rhinorrhea.      Mouth/Throat:      Mouth: Mucous membranes are moist.      Pharynx: Oropharynx is clear. No oropharyngeal exudate or posterior oropharyngeal erythema.   Eyes:      Extraocular Movements: Extraocular movements intact.      Conjunctiva/sclera: Conjunctivae normal.      Pupils: Pupils are equal, round, and reactive to light.   Cardiovascular:      Rate and Rhythm: Normal rate and regular rhythm.   Pulmonary:      Effort: Pulmonary effort is normal.      Breath sounds: No wheezing, rhonchi or rales.   Musculoskeletal:         General: Normal range of motion.      Left hand: Swelling (with few small erythematous lesions at site of stings; no visible or palpable FB,  no lymphangitic streaking. Normal distal strength, sensation, and pulses; cap refill <2 seconds) present. No tenderness. Normal range of motion.      Cervical back: Normal range of motion.   Lymphadenopathy:      Cervical: No cervical adenopathy.   Skin:     General: Skin is warm and dry.   Neurological:      General: No focal deficit present.      Mental Status: He is alert.       Assessment:     1. Wasp sting, accidental or unintentional, initial encounter    2. Hand pain, left       Plan:   Wasp sting, accidental or unintentional, initial encounter  -     cephalexin (KEFTAB) 500 mg tablet; Take 1 tablet (500 mg total) by mouth 2 (two) times a day. for 7 days  Dispense: 14 tablet; Refill: 0    Hand pain, left  -     acetaminophen-codeine 300-30mg (TYLENOL #3) 300-30 mg Tab; Take 1 tablet by mouth every 8 (eight) hours as needed (pain). (Patient not taking: Reported on 7/26/2022)  Dispense: 20 tablet; Refill: 0          Follow up if symptoms worsen or fail to improve.    Patient Instructions   Antibiotic as prescribed.  Continue ice and elevation.  Tylenol #3 for pain.   Elevate when possible.  Let us know if symptoms persist.

## 2022-07-29 ENCOUNTER — TELEPHONE (OUTPATIENT)
Dept: PRIMARY CARE CLINIC | Facility: CLINIC | Age: 87
End: 2022-07-29
Payer: MEDICARE

## 2022-07-29 DIAGNOSIS — Z01.818 PREOP EXAMINATION: Primary | ICD-10-CM

## 2022-07-29 DIAGNOSIS — G56.02 CARPAL TUNNEL SYNDROME ON LEFT: ICD-10-CM

## 2022-07-29 NOTE — PROGRESS NOTES
Since our visit on 7/14, he has seen Dr. Maria at [facility] [place].  They plan to do a left carpal tunnel release under local/mac sedation.    Patient's cardiologist seems him to be an acceptable risk of major cardiovascular events for the procedure.  Letter scanned into chart.    As such, he should be okay to proceed with the surgery.    Orthopedist office is requesting CBC, BMP, chest x-ray.  Will place orders for these today and will forward to them once available.

## 2022-07-29 NOTE — PROGRESS NOTES
Since our visit on 7/14, he has seen Dr. Maria at [facility].  They plan to do a left carpal tunnel release under local/mac sedation.    Patient's cardiologist seems him to be an acceptable risk of major cardiovascular events for the procedure.  Letter scanned into chart.    As such, he should be okay to proceed with the surgery.    Orthopedist office is requesting CBC, BMP, chest x-ray.  Will place orders for these today and will forward to them once available.  EKG to be provided by Cardiology.

## 2022-07-29 NOTE — TELEPHONE ENCOUNTER
----- Message from Nathalia Morgan sent at 7/29/2022  9:21 AM CDT -----  Contact: kieran/DR hernandez nurse  Kieran would like a call back at  [phone] in regards to the referral they received for the patient. She would like to some ask some questions.    Dr Michael Hernandez Gen Surgery.    Thanks

## 2022-08-01 ENCOUNTER — HOSPITAL ENCOUNTER (OUTPATIENT)
Dept: RADIOLOGY | Facility: HOSPITAL | Age: 87
Discharge: HOME | End: 2022-08-01
Attending: FAMILY MEDICINE
Payer: MEDICARE

## 2022-08-01 DIAGNOSIS — G56.02 CARPAL TUNNEL SYNDROME ON LEFT: ICD-10-CM

## 2022-08-01 DIAGNOSIS — Z01.818 PREOP EXAMINATION: ICD-10-CM

## 2022-08-01 PROCEDURE — 71046 X-RAY EXAM CHEST 2 VIEWS: CPT | Mod: TC,PN

## 2022-08-05 ENCOUNTER — TELEPHONE (OUTPATIENT)
Dept: ORTHOPEDICS | Facility: CLINIC | Age: 87
End: 2022-08-05
Payer: MEDICARE

## 2022-08-05 NOTE — TELEPHONE ENCOUNTER
Spoke to Pt's daughter @ 8:41am to schedule appointment for Pt but Pt's daughter said her dad was currently having surgery on hand. I wish them well

## 2022-10-10 DIAGNOSIS — I48.20 ATRIAL FIBRILLATION, CHRONIC: ICD-10-CM

## 2022-10-10 RX ORDER — WARFARIN SODIUM 5 MG/1
TABLET ORAL
Qty: 90 TABLET | Refills: 1 | Status: SHIPPED | OUTPATIENT
Start: 2022-10-10 | End: 2023-05-22 | Stop reason: SDUPTHER

## 2022-10-10 NOTE — TELEPHONE ENCOUNTER
Hima Cannon MD,    Please see the attached refill request and renew/deny as appropriate.     Thanks.   Izzy Romo

## 2022-10-14 ENCOUNTER — OFFICE VISIT (OUTPATIENT)
Dept: PRIMARY CARE CLINIC | Facility: CLINIC | Age: 87
End: 2022-10-14
Payer: MEDICARE

## 2022-10-14 VITALS
SYSTOLIC BLOOD PRESSURE: 118 MMHG | HEART RATE: 55 BPM | TEMPERATURE: 98 F | WEIGHT: 166.44 LBS | HEIGHT: 72 IN | BODY MASS INDEX: 22.54 KG/M2 | DIASTOLIC BLOOD PRESSURE: 64 MMHG

## 2022-10-14 DIAGNOSIS — M25.532 LEFT WRIST PAIN: Primary | ICD-10-CM

## 2022-10-14 DIAGNOSIS — R29.898 POPPING SOUND OF KNEE JOINT: ICD-10-CM

## 2022-10-14 DIAGNOSIS — I73.9 PERIPHERAL VASCULAR DISEASE: ICD-10-CM

## 2022-10-14 DIAGNOSIS — M15.9 PRIMARY OSTEOARTHRITIS INVOLVING MULTIPLE JOINTS: ICD-10-CM

## 2022-10-14 DIAGNOSIS — K40.90 RIGHT INGUINAL HERNIA: ICD-10-CM

## 2022-10-14 DIAGNOSIS — Z74.09 IMPAIRED MOBILITY AND ADLS: ICD-10-CM

## 2022-10-14 DIAGNOSIS — Z78.9 IMPAIRED MOBILITY AND ADLS: ICD-10-CM

## 2022-10-14 DIAGNOSIS — Z99.89 DEPENDENCE ON OTHER ENABLING MACHINES AND DEVICES: ICD-10-CM

## 2022-10-14 DIAGNOSIS — M51.37 DDD (DEGENERATIVE DISC DISEASE), LUMBOSACRAL: ICD-10-CM

## 2022-10-14 PROCEDURE — 99499 RISK ADDL DX/OHS AUDIT: ICD-10-PCS | Mod: S$GLB,,, | Performed by: FAMILY MEDICINE

## 2022-10-14 PROCEDURE — G0008 FLU VACCINE - QUADRIVALENT - ADJUVANTED: ICD-10-PCS | Mod: S$GLB,,, | Performed by: FAMILY MEDICINE

## 2022-10-14 PROCEDURE — 90694 FLU VACCINE - QUADRIVALENT - ADJUVANTED: ICD-10-PCS | Mod: S$GLB,,, | Performed by: FAMILY MEDICINE

## 2022-10-14 PROCEDURE — G0008 ADMIN INFLUENZA VIRUS VAC: HCPCS | Mod: S$GLB,,, | Performed by: FAMILY MEDICINE

## 2022-10-14 PROCEDURE — 1160F PR REVIEW ALL MEDS BY PRESCRIBER/CLIN PHARMACIST DOCUMENTED: ICD-10-PCS | Mod: CPTII,S$GLB,, | Performed by: FAMILY MEDICINE

## 2022-10-14 PROCEDURE — 99999 PR PBB SHADOW E&M-EST. PATIENT-LVL IV: CPT | Mod: PBBFAC,,, | Performed by: FAMILY MEDICINE

## 2022-10-14 PROCEDURE — 1126F PR PAIN SEVERITY QUANTIFIED, NO PAIN PRESENT: ICD-10-PCS | Mod: CPTII,S$GLB,, | Performed by: FAMILY MEDICINE

## 2022-10-14 PROCEDURE — 3288F PR FALLS RISK ASSESSMENT DOCUMENTED: ICD-10-PCS | Mod: CPTII,S$GLB,, | Performed by: FAMILY MEDICINE

## 2022-10-14 PROCEDURE — 1159F MED LIST DOCD IN RCRD: CPT | Mod: CPTII,S$GLB,, | Performed by: FAMILY MEDICINE

## 2022-10-14 PROCEDURE — 1160F RVW MEDS BY RX/DR IN RCRD: CPT | Mod: CPTII,S$GLB,, | Performed by: FAMILY MEDICINE

## 2022-10-14 PROCEDURE — 99215 PR OFFICE/OUTPT VISIT, EST, LEVL V, 40-54 MIN: ICD-10-PCS | Mod: S$GLB,,, | Performed by: FAMILY MEDICINE

## 2022-10-14 PROCEDURE — 1101F PT FALLS ASSESS-DOCD LE1/YR: CPT | Mod: CPTII,S$GLB,, | Performed by: FAMILY MEDICINE

## 2022-10-14 PROCEDURE — 1159F PR MEDICATION LIST DOCUMENTED IN MEDICAL RECORD: ICD-10-PCS | Mod: CPTII,S$GLB,, | Performed by: FAMILY MEDICINE

## 2022-10-14 PROCEDURE — 90694 VACC AIIV4 NO PRSRV 0.5ML IM: CPT | Mod: S$GLB,,, | Performed by: FAMILY MEDICINE

## 2022-10-14 PROCEDURE — 1126F AMNT PAIN NOTED NONE PRSNT: CPT | Mod: CPTII,S$GLB,, | Performed by: FAMILY MEDICINE

## 2022-10-14 PROCEDURE — 3288F FALL RISK ASSESSMENT DOCD: CPT | Mod: CPTII,S$GLB,, | Performed by: FAMILY MEDICINE

## 2022-10-14 PROCEDURE — 99999 PR PBB SHADOW E&M-EST. PATIENT-LVL IV: ICD-10-PCS | Mod: PBBFAC,,, | Performed by: FAMILY MEDICINE

## 2022-10-14 PROCEDURE — 99499 UNLISTED E&M SERVICE: CPT | Mod: S$GLB,,, | Performed by: FAMILY MEDICINE

## 2022-10-14 PROCEDURE — 1101F PR PT FALLS ASSESS DOC 0-1 FALLS W/OUT INJ PAST YR: ICD-10-PCS | Mod: CPTII,S$GLB,, | Performed by: FAMILY MEDICINE

## 2022-10-14 PROCEDURE — 99215 OFFICE O/P EST HI 40 MIN: CPT | Mod: S$GLB,,, | Performed by: FAMILY MEDICINE

## 2022-10-14 NOTE — PROGRESS NOTES
"    [facility]       [address] [place] [address]      Phone: [phone]      Fax: [phone]    Hima Cannon MD                Office Visit  10/14/2022        Subjective      HPI:  Claude L Taylor is a 89 y.o. male presents today in clinic for "Follow-up  ."     89-year-old gentleman presents today to discuss a couple of issues.    His wife, Ms. Quiroga, is present with him today.  She provides portion of the history.    Patient states that he has long history of dry eyes.  Has been fighting this for years.  Currently sees Dr. Brown, ophthalmology, and Dr. Sampson, glaucoma specialist, regularly.  They have tried him on different eyedrops, with varying degrees of success.  Decided to go back to Dr. Nelson at [place].  There, he is doing an intensive light treatment.  After three treatments, is really noticing a difference.  Has his last treatment next week.  Has noticed he is not needing eyedrops as much.    States that he has a pacemaker, has had valve replacement with pig valve.  Procedure was done eight years ago.  Follows with his cardiologist regularly.      Also sees Dermatology regularly for skin checks.    Has noticed a slight bulge in his right groin area.  Notices it more when standing, bending.  No pain.  Occasionally feels uncomfortable.    States that he has a spinal stimulator in his back for his degenerative disc disease.  Has been there for several years.  Never gave him any relief.  Would like to get it removed at some point.    States that his arthritis continues.  Both knees feel weak.  Right knee worse than the left.  Sometimes they pop when he walks.  Occasionally, they want to give out on him.  States toes get stiff from time to time.  At night, he has to sleep on his side otherwise they become painful.  Right toes are worse than the left.    Was having some left hand/wrist pain.  Saw hand specialist.  Had injections, carpal tunnel " surgery.  No significant improvement.  Middle three fingers tend to stay stiff, have stinging type pains.  Will see hand specialist in about three weeks for follow-up.    With his arthritis, degenerative disc disease, knee issues, PVD, he has significant mobility issues.  Has difficulty getting around in his house.  Has difficulty walking outside of the house.  Standing for long periods is challenging.  This causes difficulty with cooking, preparing meals.  Has difficulty with bathing, dressing, as well, due to joint pains, mobility.  He has a cane and walker, but still has difficulty with mobility even with these.  He is a fall risk using cane/walker.  He does have a power scooter at home, which he received approximately six years ago.  Scooter is broken.  DME company has come out and attempted to order parts.  They told him parts are not available right now to repair it.  As such, he should possibly look into getting a replacement scooter.  He is willing and able to operate the scooter.    PMH:  CAD, AFib, PVD, BPH, OA/DDD/chronic pains.  PSH:  Pacemaker, heart ablation/heart valve repair/heart catheterization, CABG, tonsils, multiple MSK (back, foot), gallbladder  Allergies:  Oxycodone makes him very emotional  Social:  Retired.  .  T:  Denies  A:  Occasionally  D:  Denies    Exercise:  No regular exercise program.  Has rolling walker and scooter to help get around.    Cardiology:  Dr. Barrios  Ophthalmology:  Dr. Brown, Dr. Sampson  Dermatology: Dr. Morfin      The following were updated and reviewed by myself in the chart: medications, past medical history, past surgical history, family history, social history, and allergies.     Medications:  Current Outpatient Medications on File Prior to Visit   Medication Sig Dispense Refill    COVID-19 vac, rachna,Pfizer,,PF, (PFIZER COVID-19 RACHNA VACCN,PF,) 30 mcg/0.3 mL injection Inject into the muscle. 0.3 mL 0    diclofenac sodium (VOLTAREN) 1 % Gel Apply 2 g topically  daily as needed (foot pain). 100 g 2    furosemide (LASIX) 20 MG tablet Take 1 tablet (20 mg total) by mouth daily as needed. 180 tablet 3    gabapentin (NEURONTIN) 100 MG capsule Take 2 capsules in the morning, 2 capsules at  mid-day, and 3 capsule by mouth at bedtime for neuropathy (Patient taking differently: Take 2 capsules in the morning, 2 capsules at  mid-day, and 3 capsule by mouth at bedtime for neuropathy) 630 capsule 3    latanoprost 0.005 % ophthalmic solution Place 1 drop into both eyes every evening. 7.5 mL 0    propylene glycol/peg 400/PF (SYSTANE, PF, OPHT) Apply to eye.      tamsulosin (FLOMAX) 0.4 mg Cap Take 2 capsules (0.8 mg total) by mouth every evening. For prostate and urine flow 180 capsule 3    warfarin (COUMADIN) 5 MG tablet Take 1 tablet by mouth every day or as directed by coumadin clinic 90 tablet 1    [DISCONTINUED] acetaminophen-codeine 300-30mg (TYLENOL #3) 300-30 mg Tab Take 1 tablet by mouth every 8 (eight) hours as needed (pain). (Patient not taking: Reported on 7/26/2022) 20 tablet 0    [DISCONTINUED] cephALEXin (KEFLEX) 500 MG capsule Take 500 mg by mouth 2 (two) times daily.      [DISCONTINUED] cyanocobalamin, vitamin B-12, 1,000 mcg/mL Drop Place 5,000 mcg under the tongue once daily. 120 mL 3    [DISCONTINUED] cycloSPORINE (RESTASIS) 0.05 % ophthalmic emulsion Place 1 drop into both eyes 2 (two) times daily. 180 each 3    [DISCONTINUED] eyelid cleanser combination 1 (STERILID) Foam Apply 1 application topically 2 (two) times a day. 1 each PRN    [DISCONTINUED] fluorometholone 0.1% (FML) 0.1 % DrpS Place 1 drop into both eyes 2 (two) times daily. (Patient not taking: Reported on 7/26/2022) 10 mL 3    [DISCONTINUED] ketoconazole (NIZORAL) 2 % shampoo Shampoo/wash daily or as needed for flares. (Patient not taking: Reported on 7/26/2022) 120 mL 6    [DISCONTINUED] peg 400-propylene glycol (SYSTANE, PROPYLENE GLYCOL,) 0.4-0.3 % Drop Apply to eye.      [DISCONTINUED] polymyxin B  sulf-trimethoprim (POLYTRIM) 10,000 unit- 1 mg/mL Drop Place 1 drop into both eyes every 6 (six) hours. 10 mL 1    [DISCONTINUED] travoprost (TRAVATAN Z) 0.004 % ophthalmic solution Place 1 drop into both eyes every evening. 2.5 mL 6     No current facility-administered medications on file prior to visit.        PMHx:  Past Medical History:   Diagnosis Date    *Atrial fibrillation     pacer Dr Barrios    Anticoagulant long-term use     Cancer     skin    Cervical neck pain with evidence of disc disease     Colon polyps 12/14/2015    Coronary artery disease     Depression     Diverticulosis     Hypertension     Kidney stone     Skin cancer       Patient Active Problem List    Diagnosis Date Noted    Positive SHOAIB (antinuclear antibody) 05/02/2022    Keratoconjunctivitis sicca due to decreased tear production, bilateral 05/02/2022    Primary osteoarthritis involving multiple joints 05/02/2022    Dependence on other enabling machines and devices 11/09/2021    Thrombocytopenia, unspecified 05/03/2021    Atrial fibrillation, chronic 05/03/2021    Osteoporosis 05/03/2021    Sacroiliitis 09/25/2019    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    SOB (shortness of breath) on exertion 03/27/2019    Benign prostatic hyperplasia with weak urinary stream 03/27/2019    Cardiac pacemaker 07/31/2018    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    Nonrheumatic mitral (valve) insufficiency 07/31/2018    Peripheral vascular disease 07/31/2018    Hx of CABG 05/23/2018    Spinal stenosis of lumbar region at multiple levels 05/23/2018    DDD (degenerative disc disease), lumbosacral 05/23/2018    Proximal muscle weakness 05/23/2018    OAB (overactive bladder) 05/23/2018    Coronary artery disease involving native coronary artery of native heart without angina pectoris 10/05/2017    Permanent atrial fibrillation 10/05/2017    Anticoagulated on Coumadin 10/05/2017    B12 deficiency due to diet 10/05/2017    Primary osteoarthritis of both knees 07/14/2017     "Chronic constipation 09/26/2016    Anismus 12/14/2015    Diverticulosis of large intestine without hemorrhage 12/14/2015    Altered bowel habits 12/13/2015    Chronic back pain 09/15/2015    Osteoarthritis 11/18/2014    Long term (current) use of anticoagulants 11/06/2014    Asthma in adult 01/14/2014    Coronary artery disease involving native coronary artery of native heart with angina pectoris 12/10/2013    Depression         PSHx:  Past Surgical History:   Procedure Laterality Date    back fusion      CARDIAC PACEMAKER PLACEMENT      CARDIAC VALVE REPLACEMENT      CHOLECYSTECTOMY      COLONOSCOPY  3/22/2013    COLONOSCOPY N/A 12/14/2015    Procedure: COLONOSCOPY;  Surgeon: Gonzalo Gorman MD;  Location: [facility];  Service: Endoscopy;  Laterality: N/A;    CORONARY ARTERY BYPASS GRAFT      ELBOW BURSA SURGERY      left    EYE SURGERY      FOOT SURGERY      MITRAL VALVE REPLACEMENT      REPLACEMENT OF PACEMAKER  09/2019    SHOULDER SURGERY      SKIN CANCER EXCISION  01/2018/   04/2018 Dr.Thorla Jones    lower left leg     SPINE SURGERY  3 surgeries    TONSILLECTOMY          FHx:  Family History   Problem Relation Age of Onset    Cancer Father     Arthritis Father     Colon cancer Neg Hx         Social:  Social History     Socioeconomic History    Marital status:      Spouse name: marguerite Barron   Tobacco Use    Smoking status: Never    Smokeless tobacco: Never    Tobacco comments:     never a smoker   Substance and Sexual Activity    Alcohol use: Not Currently     Alcohol/week: 2.0 standard drinks     Types: 1 Glasses of wine, 1 Cans of beer per week     Comment: drinks above only with meals ocassionaly    Drug use: Never    Sexual activity: Not Currently     Partners: Female     Comment: too old at 89        Allergies:  Review of patient's allergies indicates:   Allergen Reactions    Oxycodone      "it's mental/emotional        ROS:  Review of Systems   Constitutional:  Negative for activity change, " appetite change, chills and fever.   HENT:  Negative for congestion, postnasal drip, rhinorrhea, sore throat and trouble swallowing.    Respiratory:  Negative for cough and shortness of breath.    Cardiovascular:  Negative for chest pain and palpitations.   Gastrointestinal:  Negative for abdominal pain, constipation, diarrhea, nausea and vomiting.   Genitourinary:  Negative for difficulty urinating.   Musculoskeletal:  Positive for arthralgias and gait problem.   Skin:  Negative for color change and rash.   Neurological:  Positive for weakness. Negative for headaches.   All other systems reviewed and are negative.       Objective      /64   Pulse (!) 55   Temp 98.2 °F (36.8 °C)   Ht 6' (1.829 m)   Wt 75.5 kg (166 lb 7.2 oz)   BMI 22.57 kg/m²   Ht Readings from Last 3 Encounters:   10/14/22 6' (1.829 m)   07/06/22 6' (1.829 m)   05/02/22 6' (1.829 m)     Wt Readings from Last 3 Encounters:   10/14/22 75.5 kg (166 lb 7.2 oz)   07/14/22 78.9 kg (173 lb 15.1 oz)   07/06/22 79.8 kg (175 lb 13.1 oz)       PHYSICAL EXAM:  Physical Exam  Vitals and nursing note reviewed.   Constitutional:       General: He is not in acute distress.     Appearance: Normal appearance.   HENT:      Head: Normocephalic and atraumatic.      Right Ear: Tympanic membrane, ear canal and external ear normal.      Left Ear: Tympanic membrane, ear canal and external ear normal.      Nose: Nose normal. No congestion or rhinorrhea.      Mouth/Throat:      Mouth: Mucous membranes are moist.      Pharynx: Oropharynx is clear. No oropharyngeal exudate or posterior oropharyngeal erythema.   Eyes:      Extraocular Movements: Extraocular movements intact.      Conjunctiva/sclera: Conjunctivae normal.      Pupils: Pupils are equal, round, and reactive to light.   Cardiovascular:      Rate and Rhythm: Normal rate and regular rhythm.   Pulmonary:      Effort: Pulmonary effort is normal.      Breath sounds: No wheezing, rhonchi or rales.    Lymphadenopathy:      Cervical: No cervical adenopathy.   Skin:     General: Skin is warm and dry.   Neurological:      General: No focal deficit present.      Mental Status: He is alert.            LABS / IMAGING:  Recent Results (from the past 4368 hour(s))   Aerobic culture    Collection Time: 07/22/22 10:00 AM    Specimen: Conjunctiva, Right   Result Value Ref Range    Aerobic Bacterial Culture Skin yolis,  no predominant organism    CBC W/ AUTO DIFFERENTIAL    Collection Time: 08/01/22  7:56 AM   Result Value Ref Range    WBC 5.22 3.90 - 12.70 K/uL    RBC 4.04 (L) 4.60 - 6.20 M/uL    Hemoglobin 12.3 (L) 14.0 - 18.0 g/dL    Hematocrit 38.5 (L) 40.0 - 54.0 %    MCV 95 82 - 98 fL    MCH 30.4 27.0 - 31.0 pg    MCHC 31.9 (L) 32.0 - 36.0 g/dL    RDW 14.6 (H) 11.5 - 14.5 %    Platelets 182 150 - 450 K/uL    MPV 11.0 9.2 - 12.9 fL    Immature Granulocytes 0.4 0.0 - 0.5 %    Gran # (ANC) 2.8 1.8 - 7.7 K/uL    Immature Grans (Abs) 0.02 0.00 - 0.04 K/uL    Lymph # 1.4 1.0 - 4.8 K/uL    Mono # 0.6 0.3 - 1.0 K/uL    Eos # 0.3 0.0 - 0.5 K/uL    Baso # 0.06 0.00 - 0.20 K/uL    nRBC 0 0 /100 WBC    Gran % 54.5 38.0 - 73.0 %    Lymph % 26.6 18.0 - 48.0 %    Mono % 11.1 4.0 - 15.0 %    Eosinophil % 6.3 0.0 - 8.0 %    Basophil % 1.1 0.0 - 1.9 %    Differential Method Automated    BASIC METABOLIC PANEL    Collection Time: 08/01/22  7:56 AM   Result Value Ref Range    Sodium 137 136 - 145 mmol/L    Potassium 4.3 3.5 - 5.1 mmol/L    Chloride 104 95 - 110 mmol/L    CO2 24 23 - 29 mmol/L    Glucose 107 70 - 110 mg/dL    BUN 12 10 - 30 mg/dL    Creatinine 0.8 0.5 - 1.4 mg/dL    Calcium 9.0 8.7 - 10.5 mg/dL    Anion Gap 9 8 - 16 mmol/L    eGFR >60.0 >60 mL/min/1.73 m^2         Assessment    1. Left wrist pain    2. Popping sound of knee joint    3. Right inguinal hernia    4. Primary osteoarthritis involving multiple joints    5. DDD (degenerative disc disease), lumbosacral    6. Peripheral vascular disease    7. Dependence on other  enabling machines and devices    8. Impaired mobility and ADLs          Plan    Claude was seen today for follow-up.    Diagnoses and all orders for this visit:    Left wrist pain    Popping sound of knee joint    Right inguinal hernia    Primary osteoarthritis involving multiple joints  -     MOTORIZED SCOOTER FOR HOME USE    DDD (degenerative disc disease), lumbosacral  -     MOTORIZED SCOOTER FOR HOME USE    Peripheral vascular disease  -     MOTORIZED SCOOTER FOR HOME USE    Dependence on other enabling machines and devices  -     MOTORIZED SCOOTER FOR HOME USE    Impaired mobility and ADLs  -     MOTORIZED SCOOTER FOR HOME USE    Other orders  -     Influenza - Quadrivalent (Adjuvanted)    Physically, he looks okay today.      Recommended he keep his follow-up appointments with specialists, as scheduled.      Discussed doing PT/OT for hand/wrist pain, knee issues, back issues.  He is unable to make regular appointments had outpatient therapy due to mobility, transportation issues.    Parts are not available for is scooter.  He is able and willing to continue using the scooter.  Will place new order today to see about getting a replacement.    FOLLOW-UP:  Follow up in about 4 months (around 2/14/2023) for check up.    I spent a total of 45 minutes face to face and non-face to face on the date of this visit.This includes time preparing to see the patient (eg, review of tests, notes), obtaining and/or reviewing additional history from an independent historian and/or outside medical records, documenting clinical information in the electronic health record, independently interpreting results and/or communicating results to the patient/family/caregiver, or care coordinator.    Signed by:  Hima Cannon MD

## 2022-10-14 NOTE — PATIENT INSTRUCTIONS
Physically, everything looks pretty good today.      If that is a hernia in the groin, it does appear to be quite small.  As long as it is not bothering you, we can just watch it.  If it gets to the point where it is trouble some any one it repaired, please let me know.  We can always get you with a general surgeon to discuss your options.      We will print an order for a replacement scooter today.  Please call us next week and let us know if you need it.  At that point, we will fax it to your insurance company to start the process.    Continue to eat a healthy diet.  Be careful with portion sizes.  Includes lots of fresh fruits, vegetables, whole grains, lean proteins.      Keep hydrated.  Be sure to drink at least 8-10, 8 oz, glasses of water every day.    Stay active.  Try to do some sort of physical activity every day.      If you decide you would like to do some physical therapy to help strengthen the knees, legs, and wrist, please let me know.  We can always arrange for a home health physical therapist/occupational therapist to come out and work with you at home.

## 2022-11-01 ENCOUNTER — OFFICE VISIT (OUTPATIENT)
Dept: PRIMARY CARE CLINIC | Facility: CLINIC | Age: 87
End: 2022-11-01
Payer: MEDICARE

## 2022-11-01 VITALS
TEMPERATURE: 98 F | DIASTOLIC BLOOD PRESSURE: 68 MMHG | HEIGHT: 72 IN | BODY MASS INDEX: 22.9 KG/M2 | WEIGHT: 169.06 LBS | HEART RATE: 60 BPM | SYSTOLIC BLOOD PRESSURE: 122 MMHG

## 2022-11-01 DIAGNOSIS — T14.8XXA INFECTED SKIN TEAR: Primary | ICD-10-CM

## 2022-11-01 DIAGNOSIS — S81.812A LACERATION OF SKIN OF LEFT LOWER LEG, INITIAL ENCOUNTER: ICD-10-CM

## 2022-11-01 DIAGNOSIS — L08.9 INFECTED SKIN TEAR: Primary | ICD-10-CM

## 2022-11-01 PROCEDURE — 1101F PT FALLS ASSESS-DOCD LE1/YR: CPT | Mod: CPTII,S$GLB,, | Performed by: FAMILY MEDICINE

## 2022-11-01 PROCEDURE — 3288F FALL RISK ASSESSMENT DOCD: CPT | Mod: CPTII,S$GLB,, | Performed by: FAMILY MEDICINE

## 2022-11-01 PROCEDURE — 1101F PR PT FALLS ASSESS DOC 0-1 FALLS W/OUT INJ PAST YR: ICD-10-PCS | Mod: CPTII,S$GLB,, | Performed by: FAMILY MEDICINE

## 2022-11-01 PROCEDURE — 87186 SC STD MICRODIL/AGAR DIL: CPT | Performed by: FAMILY MEDICINE

## 2022-11-01 PROCEDURE — 1126F PR PAIN SEVERITY QUANTIFIED, NO PAIN PRESENT: ICD-10-PCS | Mod: CPTII,S$GLB,, | Performed by: FAMILY MEDICINE

## 2022-11-01 PROCEDURE — 99214 PR OFFICE/OUTPT VISIT, EST, LEVL IV, 30-39 MIN: ICD-10-PCS | Mod: S$GLB,,, | Performed by: FAMILY MEDICINE

## 2022-11-01 PROCEDURE — 87077 CULTURE AEROBIC IDENTIFY: CPT | Performed by: FAMILY MEDICINE

## 2022-11-01 PROCEDURE — 87205 SMEAR GRAM STAIN: CPT | Performed by: FAMILY MEDICINE

## 2022-11-01 PROCEDURE — 87070 CULTURE OTHR SPECIMN AEROBIC: CPT | Performed by: FAMILY MEDICINE

## 2022-11-01 PROCEDURE — 1126F AMNT PAIN NOTED NONE PRSNT: CPT | Mod: CPTII,S$GLB,, | Performed by: FAMILY MEDICINE

## 2022-11-01 PROCEDURE — 99999 PR PBB SHADOW E&M-EST. PATIENT-LVL IV: CPT | Mod: PBBFAC,,, | Performed by: FAMILY MEDICINE

## 2022-11-01 PROCEDURE — 99214 OFFICE O/P EST MOD 30 MIN: CPT | Mod: S$GLB,,, | Performed by: FAMILY MEDICINE

## 2022-11-01 PROCEDURE — 1159F MED LIST DOCD IN RCRD: CPT | Mod: CPTII,S$GLB,, | Performed by: FAMILY MEDICINE

## 2022-11-01 PROCEDURE — 3288F PR FALLS RISK ASSESSMENT DOCUMENTED: ICD-10-PCS | Mod: CPTII,S$GLB,, | Performed by: FAMILY MEDICINE

## 2022-11-01 PROCEDURE — 1159F PR MEDICATION LIST DOCUMENTED IN MEDICAL RECORD: ICD-10-PCS | Mod: CPTII,S$GLB,, | Performed by: FAMILY MEDICINE

## 2022-11-01 PROCEDURE — 99999 PR PBB SHADOW E&M-EST. PATIENT-LVL IV: ICD-10-PCS | Mod: PBBFAC,,, | Performed by: FAMILY MEDICINE

## 2022-11-01 RX ORDER — SULFAMETHOXAZOLE AND TRIMETHOPRIM 800; 160 MG/1; MG/1
1 TABLET ORAL 2 TIMES DAILY
Qty: 10 TABLET | Refills: 0 | Status: SHIPPED | OUTPATIENT
Start: 2022-11-01 | End: 2022-11-01

## 2022-11-01 RX ORDER — MUPIROCIN 20 MG/G
OINTMENT TOPICAL NIGHTLY
Qty: 22 G | Refills: 0 | Status: SHIPPED | OUTPATIENT
Start: 2022-11-01 | End: 2022-11-01

## 2022-11-01 RX ORDER — SULFAMETHOXAZOLE AND TRIMETHOPRIM 800; 160 MG/1; MG/1
1 TABLET ORAL 2 TIMES DAILY
Qty: 10 TABLET | Refills: 0 | Status: SHIPPED | OUTPATIENT
Start: 2022-11-01 | End: 2022-11-06

## 2022-11-01 RX ORDER — MUPIROCIN 20 MG/G
OINTMENT TOPICAL NIGHTLY
Qty: 22 G | Refills: 0 | Status: SHIPPED | OUTPATIENT
Start: 2022-11-01 | End: 2022-11-06

## 2022-11-01 NOTE — PROGRESS NOTES
"    [facility]       [address] [place] [address]      Phone: [phone]      Fax: [phone]    Hima Cannon MD                Office Visit  11/01/2022        Subjective      HPI:  Claude L Taylor is a 89 y.o. male presents today in clinic for "Laceration (Cut on left leg)  ."     89-year-old gentleman presents today to discuss a couple of issues.    His wife, Ms. Quiroga, is present with him today.  She provides portion of the history.    Patient states that a few days ago he bumped into something.  Created a small laceration/wound on his left lower leg, outside of the calf.  He put some Neosporin and a Band-Aid on it.  The next day, when he removed the Band-Aid, it tore all of the skin underneath the Band-Aid off.  Has been trying to keep it clean, using Neosporin, but since it was not healing he thought he should get it checked out.      Does have some drainage, oozing from the wound.  No fever, chills.    PMH:  CAD, AFib, PVD, BPH, OA/DDD/chronic pains.  PSH:  Pacemaker, heart ablation/heart valve repair/heart catheterization, CABG, tonsils, multiple MSK (back, foot), gallbladder  Allergies:  Oxycodone makes him very emotional  Social:  Retired.  .  T:  Denies  A:  Occasionally  D:  Denies    Exercise:  No regular exercise program.  Has rolling walker and scooter to help get around.    Cardiology:  Dr. Barrios  Ophthalmology:  Dr. Brown, Dr. Sampson  Dermatology: Dr. Morfin      The following were updated and reviewed by myself in the chart: medications, past medical history, past surgical history, family history, social history, and allergies.     Medications:  Current Outpatient Medications on File Prior to Visit   Medication Sig Dispense Refill    diclofenac sodium (VOLTAREN) 1 % Gel Apply 2 g topically daily as needed (foot pain). 100 g 2    fluorometholone 0.1% (FML) 0.1 % DrpS Place 1 drop into both eyes once daily. 15 mL 0    furosemide " (LASIX) 20 MG tablet Take 1 tablet (20 mg total) by mouth daily as needed. 180 tablet 3    gabapentin (NEURONTIN) 100 MG capsule Take 2 capsules in the morning, 2 capsules at  mid-day, and 3 capsule by mouth at bedtime for neuropathy (Patient taking differently: Take 2 capsules in the morning, 2 capsules at  mid-day, and 3 capsule by mouth at bedtime for neuropathy) 630 capsule 3    latanoprost 0.005 % ophthalmic solution Place 1 drop into both eyes every evening. 7.5 mL 0    latanoprost 0.005 % ophthalmic solution Instill 1 drop in each eye every night at bedtime. 7.5 mL 3    propylene glycol/peg 400/PF (SYSTANE, PF, OPHT) Apply to eye.      tamsulosin (FLOMAX) 0.4 mg Cap Take 2 capsules (0.8 mg total) by mouth every evening. For prostate and urine flow 180 capsule 3    warfarin (COUMADIN) 5 MG tablet Take 1 tablet by mouth every day or as directed by coumadin clinic 90 tablet 1    COVID-19 vac, rachna,Pfizer,,PF, (PFIZER COVID-19 RACHNA VACCN,PF,) 30 mcg/0.3 mL injection Inject into the muscle. (Patient not taking: Reported on 11/1/2022) 0.3 mL 0     No current facility-administered medications on file prior to visit.        PMHx:  Past Medical History:   Diagnosis Date    *Atrial fibrillation     pacer Dr Barrios    Anticoagulant long-term use     Cancer     skin    Cervical neck pain with evidence of disc disease     Colon polyps 12/14/2015    Coronary artery disease     Depression     Diverticulosis     Hypertension     Kidney stone     Skin cancer       Patient Active Problem List    Diagnosis Date Noted    Positive SHOAIB (antinuclear antibody) 05/02/2022    Keratoconjunctivitis sicca due to decreased tear production, bilateral 05/02/2022    Primary osteoarthritis involving multiple joints 05/02/2022    Dependence on other enabling machines and devices 11/09/2021    Thrombocytopenia, unspecified 05/03/2021    Atrial fibrillation, chronic 05/03/2021    Osteoporosis 05/03/2021    Sacroiliitis 09/25/2019    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    SOB (shortness of breath) on exertion 03/27/2019    Benign prostatic hyperplasia with weak urinary stream 03/27/2019    Cardiac pacemaker 07/31/2018    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    Nonrheumatic mitral (valve) insufficiency 07/31/2018    Peripheral vascular disease 07/31/2018    Hx of CABG 05/23/2018    Spinal stenosis of lumbar region at multiple levels 05/23/2018    DDD (degenerative disc disease), lumbosacral 05/23/2018    Proximal muscle weakness 05/23/2018    OAB (overactive bladder) 05/23/2018    Coronary artery disease involving native coronary artery of native heart without angina pectoris 10/05/2017    Permanent atrial fibrillation 10/05/2017    Anticoagulated on Coumadin 10/05/2017    B12 deficiency due to diet 10/05/2017    Primary osteoarthritis of both knees 07/14/2017    Chronic constipation 09/26/2016    Anismus 12/14/2015    Diverticulosis of large intestine without hemorrhage 12/14/2015    Altered bowel habits 12/13/2015    Chronic back pain 09/15/2015    Osteoarthritis 11/18/2014    Long term (current) use of anticoagulants 11/06/2014    Asthma in adult 01/14/2014    Coronary artery disease involving native coronary artery of native heart with angina pectoris 12/10/2013    Depression         PSHx:  Past Surgical History:   Procedure Laterality Date    back fusion      CARDIAC PACEMAKER PLACEMENT      CARDIAC VALVE REPLACEMENT      CHOLECYSTECTOMY      COLONOSCOPY  3/22/2013    COLONOSCOPY N/A 12/14/2015    Procedure: COLONOSCOPY;  Surgeon: Gonzalo Gorman MD;  Location: [facility];  Service: Endoscopy;  Laterality: N/A;    CORONARY ARTERY BYPASS GRAFT      ELBOW BURSA SURGERY      left    EYE SURGERY      FOOT SURGERY      MITRAL VALVE REPLACEMENT      REPLACEMENT OF PACEMAKER  09/2019    SHOULDER SURGERY      SKIN CANCER EXCISION  01/2018/   04/2018 Dr.Thorla Jones    lower left leg     SPINE SURGERY  3 surgeries    TONSILLECTOMY          FHx:  Family History  "  Problem Relation Age of Onset    Cancer Father     Arthritis Father     Colon cancer Neg Hx         Social:  Social History     Socioeconomic History    Marital status:      Spouse name: marguerite Barron   Tobacco Use    Smoking status: Never    Smokeless tobacco: Never    Tobacco comments:     never a smoker   Substance and Sexual Activity    Alcohol use: Not Currently     Alcohol/week: 2.0 standard drinks     Types: 1 Glasses of wine, 1 Cans of beer per week     Comment: drinks above only with meals ocassionaly    Drug use: Never    Sexual activity: Not Currently     Partners: Female     Comment: too old at 89        Allergies:  Review of patient's allergies indicates:   Allergen Reactions    Oxycodone      "it's mental/emotional        ROS:  Review of Systems   Constitutional:  Negative for activity change, appetite change, chills and fever.   HENT:  Negative for congestion, postnasal drip, rhinorrhea, sore throat and trouble swallowing.    Respiratory:  Negative for cough and shortness of breath.    Cardiovascular:  Negative for chest pain and palpitations.   Gastrointestinal:  Negative for abdominal pain, constipation, diarrhea, nausea and vomiting.   Genitourinary:  Negative for difficulty urinating.   Musculoskeletal:  Negative for arthralgias and myalgias.   Skin:  Positive for wound.   Neurological:  Negative for headaches.   All other systems reviewed and are negative.       Objective      /68   Pulse 60   Temp 98 °F (36.7 °C)   Ht 6' (1.829 m)   Wt 76.7 kg (169 lb 1.5 oz)   BMI 22.93 kg/m²   Ht Readings from Last 3 Encounters:   11/01/22 6' (1.829 m)   10/14/22 6' (1.829 m)   07/06/22 6' (1.829 m)     Wt Readings from Last 3 Encounters:   11/01/22 76.7 kg (169 lb 1.5 oz)   10/14/22 75.5 kg (166 lb 7.2 oz)   07/14/22 78.9 kg (173 lb 15.1 oz)       PHYSICAL EXAM:  Physical Exam  Vitals and nursing note reviewed.   Constitutional:       General: He is not in acute distress.     Appearance: " Normal appearance.   HENT:      Head: Normocephalic and atraumatic.      Right Ear: Tympanic membrane, ear canal and external ear normal.      Left Ear: Tympanic membrane, ear canal and external ear normal.      Nose: Nose normal. No congestion or rhinorrhea.      Mouth/Throat:      Mouth: Mucous membranes are moist.      Pharynx: Oropharynx is clear. No oropharyngeal exudate or posterior oropharyngeal erythema.   Eyes:      Extraocular Movements: Extraocular movements intact.      Conjunctiva/sclera: Conjunctivae normal.      Pupils: Pupils are equal, round, and reactive to light.   Cardiovascular:      Rate and Rhythm: Normal rate and regular rhythm.   Pulmonary:      Effort: Pulmonary effort is normal.      Breath sounds: No wheezing, rhonchi or rales.   Musculoskeletal:         General: Normal range of motion.      Cervical back: Normal range of motion.   Lymphadenopathy:      Cervical: No cervical adenopathy.   Skin:     General: Skin is warm and dry.      Findings: Wound present.      Comments: Left, lower leg, lateral side of calf, has approximately 2 cm x 6 cm skin tear.  Prurulent drainage overlying skin.  Mild erythema.  After cleaning the area with saline, appears to have good granulation tissue at the most proximal part of the wound.  Skin flap in place.   Neurological:      General: No focal deficit present.      Mental Status: He is alert.            LABS / IMAGING:  Recent Results (from the past 4368 hour(s))   Aerobic culture    Collection Time: 07/22/22 10:00 AM    Specimen: Conjunctiva, Right   Result Value Ref Range    Aerobic Bacterial Culture Skin yolis,  no predominant organism    CBC W/ AUTO DIFFERENTIAL    Collection Time: 08/01/22  7:56 AM   Result Value Ref Range    WBC 5.22 3.90 - 12.70 K/uL    RBC 4.04 (L) 4.60 - 6.20 M/uL    Hemoglobin 12.3 (L) 14.0 - 18.0 g/dL    Hematocrit 38.5 (L) 40.0 - 54.0 %    MCV 95 82 - 98 fL    MCH 30.4 27.0 - 31.0 pg    MCHC 31.9 (L) 32.0 - 36.0 g/dL    RDW  14.6 (H) 11.5 - 14.5 %    Platelets 182 150 - 450 K/uL    MPV 11.0 9.2 - 12.9 fL    Immature Granulocytes 0.4 0.0 - 0.5 %    Gran # (ANC) 2.8 1.8 - 7.7 K/uL    Immature Grans (Abs) 0.02 0.00 - 0.04 K/uL    Lymph # 1.4 1.0 - 4.8 K/uL    Mono # 0.6 0.3 - 1.0 K/uL    Eos # 0.3 0.0 - 0.5 K/uL    Baso # 0.06 0.00 - 0.20 K/uL    nRBC 0 0 /100 WBC    Gran % 54.5 38.0 - 73.0 %    Lymph % 26.6 18.0 - 48.0 %    Mono % 11.1 4.0 - 15.0 %    Eosinophil % 6.3 0.0 - 8.0 %    Basophil % 1.1 0.0 - 1.9 %    Differential Method Automated    BASIC METABOLIC PANEL    Collection Time: 08/01/22  7:56 AM   Result Value Ref Range    Sodium 137 136 - 145 mmol/L    Potassium 4.3 3.5 - 5.1 mmol/L    Chloride 104 95 - 110 mmol/L    CO2 24 23 - 29 mmol/L    Glucose 107 70 - 110 mg/dL    BUN 12 10 - 30 mg/dL    Creatinine 0.8 0.5 - 1.4 mg/dL    Calcium 9.0 8.7 - 10.5 mg/dL    Anion Gap 9 8 - 16 mmol/L    eGFR >60.0 >60 mL/min/1.73 m^2         Assessment    1. Infected skin tear    2. Laceration of skin of left lower leg, initial encounter          Plan    Claude was seen today for laceration.    Diagnoses and all orders for this visit:    Infected skin tear  -     Discontinue: sulfamethoxazole-trimethoprim 800-160mg (BACTRIM DS) 800-160 mg Tab; Take 1 tablet by mouth 2 (two) times daily. for 5 days  -     Discontinue: mupirocin (BACTROBAN) 2 % ointment; Apply topically every evening. for 5 days  -     CULTURE, TISSUE  -     mupirocin (BACTROBAN) 2 % ointment; Apply topically every evening. for 5 days  -     sulfamethoxazole-trimethoprim 800-160mg (BACTRIM DS) 800-160 mg Tab; Take 1 tablet by mouth 2 (two) times daily. for 5 days    Laceration of skin of left lower leg, initial encounter  -     Discontinue: sulfamethoxazole-trimethoprim 800-160mg (BACTRIM DS) 800-160 mg Tab; Take 1 tablet by mouth 2 (two) times daily. for 5 days  -     Discontinue: mupirocin (BACTROBAN) 2 % ointment; Apply topically every evening. for 5 days  -     CULTURE,  TISSUE  -     mupirocin (BACTROBAN) 2 % ointment; Apply topically every evening. for 5 days  -     sulfamethoxazole-trimethoprim 800-160mg (BACTRIM DS) 800-160 mg Tab; Take 1 tablet by mouth 2 (two) times daily. for 5 days    Culture obtained.      Wound cleaned with saline.  Small amount of triple by antibiotic ointment applied with gauze, Ace wrap.    He will contact us if no improvement in the next week.  If symptoms worsen, or if new bleeding starts, we will refer to general surgery for wound care.    FOLLOW-UP:  Follow up if symptoms worsen or fail to improve.    I spent a total of 35 minutes face to face and non-face to face on the date of this visit.This includes time preparing to see the patient (eg, review of tests, notes), obtaining and/or reviewing additional history from an independent historian and/or outside medical records, documenting clinical information in the electronic health record, independently interpreting results and/or communicating results to the patient/family/caregiver, or care coordinator.    Signed by:  Hima Cannon MD

## 2022-11-01 NOTE — PATIENT INSTRUCTIONS
Take all antibiotics, as prescribed.      Clean the area gently with Dial soap and water 2 times per day.  After cleaning, rinse thoroughly, and pat dry.    Leave exposed to air during the daytime.      At night, before bed, you may put a thin layer of Bactroban (mupirocin) ointment, then cover with nonstick gauze and wrap with Ace wrap or other nonstick material.    If you notice any losing, bleeding, or if more skin peels off, please call and let me know.  You can reach just directly at [phone].    Otherwise, it should heal up in about two weeks.

## 2022-11-03 LAB
BACTERIA TISS AEROBE CULT: ABNORMAL
GRAM STN SPEC: ABNORMAL
GRAM STN SPEC: ABNORMAL

## 2022-11-03 NOTE — PROGRESS NOTES
Mr. Barron,     As I expected, the wound is growing a fair bit of bacteria.  The good news is that it is sensitive to the antibiotics I sent to the pharmacy.  This should help clear up fairly quickly.

## 2022-12-06 ENCOUNTER — HOSPITAL ENCOUNTER (OUTPATIENT)
Dept: RADIOLOGY | Facility: HOSPITAL | Age: 87
Discharge: HOME | End: 2022-12-06
Attending: FAMILY MEDICINE
Payer: MEDICARE

## 2022-12-06 ENCOUNTER — OFFICE VISIT (OUTPATIENT)
Dept: PRIMARY CARE CLINIC | Facility: CLINIC | Age: 87
End: 2022-12-06
Payer: MEDICARE

## 2022-12-06 VITALS
HEART RATE: 58 BPM | BODY MASS INDEX: 22.93 KG/M2 | SYSTOLIC BLOOD PRESSURE: 120 MMHG | TEMPERATURE: 98 F | DIASTOLIC BLOOD PRESSURE: 60 MMHG | WEIGHT: 169.31 LBS | HEIGHT: 72 IN

## 2022-12-06 DIAGNOSIS — J06.9 URI WITH COUGH AND CONGESTION: ICD-10-CM

## 2022-12-06 DIAGNOSIS — J06.9 URI WITH COUGH AND CONGESTION: Primary | ICD-10-CM

## 2022-12-06 LAB
CTP QC/QA: YES
SARS-COV-2 RDRP RESP QL NAA+PROBE: NEGATIVE

## 2022-12-06 PROCEDURE — 71046 X-RAY EXAM CHEST 2 VIEWS: CPT | Mod: TC,PN

## 2022-12-06 PROCEDURE — 1159F PR MEDICATION LIST DOCUMENTED IN MEDICAL RECORD: ICD-10-PCS | Mod: CPTII,S$GLB,, | Performed by: FAMILY MEDICINE

## 2022-12-06 PROCEDURE — 1160F RVW MEDS BY RX/DR IN RCRD: CPT | Mod: CPTII,S$GLB,, | Performed by: FAMILY MEDICINE

## 2022-12-06 PROCEDURE — 99999 PR PBB SHADOW E&M-EST. PATIENT-LVL IV: ICD-10-PCS | Mod: PBBFAC,,, | Performed by: FAMILY MEDICINE

## 2022-12-06 PROCEDURE — 3288F FALL RISK ASSESSMENT DOCD: CPT | Mod: CPTII,S$GLB,, | Performed by: FAMILY MEDICINE

## 2022-12-06 PROCEDURE — 99215 OFFICE O/P EST HI 40 MIN: CPT | Mod: S$GLB,,, | Performed by: FAMILY MEDICINE

## 2022-12-06 PROCEDURE — 1126F PR PAIN SEVERITY QUANTIFIED, NO PAIN PRESENT: ICD-10-PCS | Mod: CPTII,S$GLB,, | Performed by: FAMILY MEDICINE

## 2022-12-06 PROCEDURE — 1101F PR PT FALLS ASSESS DOC 0-1 FALLS W/OUT INJ PAST YR: ICD-10-PCS | Mod: CPTII,S$GLB,, | Performed by: FAMILY MEDICINE

## 2022-12-06 PROCEDURE — 87635 SARS-COV-2 COVID-19 AMP PRB: CPT | Mod: QW,S$GLB,, | Performed by: FAMILY MEDICINE

## 2022-12-06 PROCEDURE — 99999 PR PBB SHADOW E&M-EST. PATIENT-LVL IV: CPT | Mod: PBBFAC,,, | Performed by: FAMILY MEDICINE

## 2022-12-06 PROCEDURE — 1101F PT FALLS ASSESS-DOCD LE1/YR: CPT | Mod: CPTII,S$GLB,, | Performed by: FAMILY MEDICINE

## 2022-12-06 PROCEDURE — 87635: ICD-10-PCS | Mod: QW,S$GLB,, | Performed by: FAMILY MEDICINE

## 2022-12-06 PROCEDURE — 1159F MED LIST DOCD IN RCRD: CPT | Mod: CPTII,S$GLB,, | Performed by: FAMILY MEDICINE

## 2022-12-06 PROCEDURE — 3288F PR FALLS RISK ASSESSMENT DOCUMENTED: ICD-10-PCS | Mod: CPTII,S$GLB,, | Performed by: FAMILY MEDICINE

## 2022-12-06 PROCEDURE — 1160F PR REVIEW ALL MEDS BY PRESCRIBER/CLIN PHARMACIST DOCUMENTED: ICD-10-PCS | Mod: CPTII,S$GLB,, | Performed by: FAMILY MEDICINE

## 2022-12-06 PROCEDURE — 1126F AMNT PAIN NOTED NONE PRSNT: CPT | Mod: CPTII,S$GLB,, | Performed by: FAMILY MEDICINE

## 2022-12-06 PROCEDURE — 99215 PR OFFICE/OUTPT VISIT, EST, LEVL V, 40-54 MIN: ICD-10-PCS | Mod: S$GLB,,, | Performed by: FAMILY MEDICINE

## 2022-12-06 RX ORDER — BENZONATATE 200 MG/1
200 CAPSULE ORAL 3 TIMES DAILY PRN
Qty: 30 CAPSULE | Refills: 1 | Status: SHIPPED | OUTPATIENT
Start: 2022-12-06 | End: 2022-12-06

## 2022-12-06 RX ORDER — PROMETHAZINE HYDROCHLORIDE AND DEXTROMETHORPHAN HYDROBROMIDE 6.25; 15 MG/5ML; MG/5ML
5 SYRUP ORAL EVERY 6 HOURS PRN
Qty: 120 ML | Refills: 0 | Status: SHIPPED | OUTPATIENT
Start: 2022-12-06 | End: 2022-12-16

## 2022-12-06 RX ORDER — GUAIFENESIN 600 MG/1
1200 TABLET, EXTENDED RELEASE ORAL 2 TIMES DAILY
Qty: 40 TABLET | Refills: 0 | Status: SHIPPED | OUTPATIENT
Start: 2022-12-06 | End: 2022-12-16

## 2022-12-06 RX ORDER — PROMETHAZINE HYDROCHLORIDE AND DEXTROMETHORPHAN HYDROBROMIDE 6.25; 15 MG/5ML; MG/5ML
5 SYRUP ORAL EVERY 6 HOURS PRN
Qty: 120 ML | Refills: 0 | Status: SHIPPED | OUTPATIENT
Start: 2022-12-06 | End: 2022-12-06

## 2022-12-06 RX ORDER — AZITHROMYCIN 250 MG/1
TABLET, FILM COATED ORAL
Qty: 6 TABLET | Refills: 0 | Status: SHIPPED | OUTPATIENT
Start: 2022-12-06 | End: 2022-12-06

## 2022-12-06 RX ORDER — BENZONATATE 200 MG/1
200 CAPSULE ORAL 3 TIMES DAILY PRN
Qty: 30 CAPSULE | Refills: 1 | Status: SHIPPED | OUTPATIENT
Start: 2022-12-06 | End: 2022-12-16

## 2022-12-06 RX ORDER — AZITHROMYCIN 250 MG/1
TABLET, FILM COATED ORAL
Qty: 6 TABLET | Refills: 0 | Status: SHIPPED | OUTPATIENT
Start: 2022-12-06 | End: 2022-12-11

## 2022-12-06 RX ORDER — GUAIFENESIN 600 MG/1
1200 TABLET, EXTENDED RELEASE ORAL 2 TIMES DAILY
Qty: 40 TABLET | Refills: 0 | Status: SHIPPED | OUTPATIENT
Start: 2022-12-06 | End: 2022-12-06

## 2022-12-06 NOTE — PATIENT INSTRUCTIONS
"COVID test was negative.    Chest x-ray was pretty clear.  No signs of full-blown pneumonia, but there is a bit of congestion that could potentially develop into a pneumonia.    Let us go ahead and treat you with azithromycin to help clear up your symptoms and keep it from getting worse.    Rest    Stay hydrated, drink plenty of fluids     Use OTC nasal saline spray (Ocenn's, AYR, Arm&Hammer,etc.) to clear nasal drainage and help with nasal congestion    Use an OTC antihistamine (Zyrtec or Claritin) to help dry mucus and post nasal drip    Use OTC Mucinex (plain blue box, no "letters") for sinus/chest congestion  Gargle with warm salt water for throat comfort (10-15 seconds, do NOT swallow!)    Use OTC zinc lozenges or OTC Cepacol lozenges (with benzocaine) for sore throat/cough    Use OTC Tylenol for fever, headache and body aches     You can use my Tessalon (benzonatate) during the day to help with cough.  You can also supplement with my prescription cough medicine, as needed.  If the liquid caused medicine is too sedating for daytime use, save it for bedtime.    If no better by next week, or if symptoms worsen, please call and let me know.  At that point, I may change your antibiotic to something a bit stronger.  "

## 2022-12-06 NOTE — PROGRESS NOTES
"    [facility]       [address] [place] [address]      Phone: [phone]      Fax: [phone]    Hima Cannon MD                Office Visit  12/06/2022        Subjective      HPI:  Claude L Taylor is a 89 y.o. male presents today in clinic for "Cough and Nasal Congestion  ."     89-year-old gentleman presents today complaining of feeling bad, cough.      His wife, Ms. Quiroga, is present with him today.  She provides portion of the history.    Patient states symptoms started Friday, four days ago.  States his eyes burn, nose runs.  Sore throat.  Lots of coughing.  Cough is productive of yellow sputum.  No fever, chills.  Having some body aches.  No nausea, vomiting, diarrhea.  Somewhat decreased sense of taste and smell.  Reports sore throat.  Has been taking Claritin, Mucinex, but no significant relief.    PMH:  CAD, AFib, PVD, BPH, OA/DDD/chronic pains.  PSH:  Pacemaker, heart ablation/heart valve repair/heart catheterization, CABG, tonsils, multiple MSK (back, foot), gallbladder  Allergies:  Oxycodone makes him very emotional  Social:  Retired.  .  T:  Denies  A:  Occasionally  D:  Denies    Exercise:  No regular exercise program.  Has rolling walker and scooter to help get around.    Cardiology:  Dr. Barrios  Ophthalmology:  Dr. Brown, Dr. Sampson  Dermatology: Dr. Morfin      The following were updated and reviewed by myself in the chart: medications, past medical history, past surgical history, family history, social history, and allergies.     Medications:  Current Outpatient Medications on File Prior to Visit   Medication Sig Dispense Refill    diclofenac sodium (VOLTAREN) 1 % Gel Apply 2 g topically daily as needed (foot pain). 100 g 2    fluorometholone 0.1% (FML) 0.1 % DrpS Place 1 drop into both eyes once daily. 15 mL 0    furosemide (LASIX) 20 MG tablet Take 1 tablet (20 mg total) by mouth daily as needed. 180 tablet 3    gabapentin " (NEURONTIN) 100 MG capsule Take 2 capsules in the morning, 2 capsules at  mid-day, and 3 capsule by mouth at bedtime for neuropathy (Patient taking differently: Take 2 capsules in the morning, 2 capsules at  mid-day, and 3 capsule by mouth at bedtime for neuropathy) 630 capsule 3    latanoprost 0.005 % ophthalmic solution Place 1 drop into both eyes every evening. 7.5 mL 0    latanoprost 0.005 % ophthalmic solution Instill 1 drop in each eye every night at bedtime. 7.5 mL 3    propylene glycol/peg 400/PF (SYSTANE, PF, OPHT) Apply to eye.      tamsulosin (FLOMAX) 0.4 mg Cap Take 2 capsules (0.8 mg total) by mouth every evening. For prostate and urine flow 180 capsule 3    warfarin (COUMADIN) 5 MG tablet Take 1 tablet by mouth every day or as directed by coumadin clinic 90 tablet 1    [DISCONTINUED] COVID-19 vac, rachna,Pfizer,,PF, (PFIZER COVID-19 RACHNA VACCN,PF,) 30 mcg/0.3 mL injection Inject into the muscle. (Patient not taking: Reported on 12/6/2022) 0.3 mL 0     No current facility-administered medications on file prior to visit.        PMHx:  Past Medical History:   Diagnosis Date    *Atrial fibrillation     pacer Dr Barrios    Anticoagulant long-term use     Cancer     skin    Cervical neck pain with evidence of disc disease     Colon polyps 12/14/2015    Coronary artery disease     Depression     Diverticulosis     Hypertension     Kidney stone     Skin cancer       Patient Active Problem List    Diagnosis Date Noted    Positive SHOAIB (antinuclear antibody) 05/02/2022    Keratoconjunctivitis sicca due to decreased tear production, bilateral 05/02/2022    Primary osteoarthritis involving multiple joints 05/02/2022    Dependence on other enabling machines and devices 11/09/2021    Thrombocytopenia, unspecified 05/03/2021    Atrial fibrillation, chronic 05/03/2021    Osteoporosis 05/03/2021    Sacroiliitis 09/25/2019    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    SOB (shortness of breath) on exertion 03/27/2019    Benign prostatic  hyperplasia with weak urinary stream 03/27/2019    Cardiac pacemaker 07/31/2018    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    Nonrheumatic mitral (valve) insufficiency 07/31/2018    Peripheral vascular disease 07/31/2018    Hx of CABG 05/23/2018    Spinal stenosis of lumbar region at multiple levels 05/23/2018    DDD (degenerative disc disease), lumbosacral 05/23/2018    Proximal muscle weakness 05/23/2018    OAB (overactive bladder) 05/23/2018    Coronary artery disease involving native coronary artery of native heart without angina pectoris 10/05/2017    Permanent atrial fibrillation 10/05/2017    Anticoagulated on Coumadin 10/05/2017    B12 deficiency due to diet 10/05/2017    Primary osteoarthritis of both knees 07/14/2017    Chronic constipation 09/26/2016    Anismus 12/14/2015    Diverticulosis of large intestine without hemorrhage 12/14/2015    Altered bowel habits 12/13/2015    Chronic back pain 09/15/2015    Osteoarthritis 11/18/2014    Long term (current) use of anticoagulants 11/06/2014    Asthma in adult 01/14/2014    Coronary artery disease involving native coronary artery of native heart with angina pectoris 12/10/2013    Depression         PSHx:  Past Surgical History:   Procedure Laterality Date    back fusion      CARDIAC PACEMAKER PLACEMENT      CARDIAC VALVE REPLACEMENT      CHOLECYSTECTOMY      COLONOSCOPY  3/22/2013    COLONOSCOPY N/A 12/14/2015    Procedure: COLONOSCOPY;  Surgeon: Gonzalo Gorman MD;  Location: [facility];  Service: Endoscopy;  Laterality: N/A;    CORONARY ARTERY BYPASS GRAFT      ELBOW BURSA SURGERY      left    EYE SURGERY      FOOT SURGERY      MITRAL VALVE REPLACEMENT      REPLACEMENT OF PACEMAKER  09/2019    SHOULDER SURGERY      SKIN CANCER EXCISION  01/2018/ 04/2018 Dr.Thorla Jones    lower left leg     SPINE SURGERY  3 surgeries    TONSILLECTOMY          FHx:  Family History   Problem Relation Age of Onset    Cancer Father     Arthritis Father     Colon cancer Neg Hx  "        Social:  Social History     Socioeconomic History    Marital status:      Spouse name: marguerite Barron   Tobacco Use    Smoking status: Never    Smokeless tobacco: Never    Tobacco comments:     never a smoker   Substance and Sexual Activity    Alcohol use: Not Currently     Alcohol/week: 2.0 standard drinks     Types: 1 Glasses of wine, 1 Cans of beer per week     Comment: drinks above only with meals ocassionaly    Drug use: Never    Sexual activity: Not Currently     Partners: Female     Comment: too old at 89        Allergies:  Review of patient's allergies indicates:   Allergen Reactions    Oxycodone      "it's mental/emotional        ROS:  Review of Systems   Constitutional:  Positive for activity change. Negative for appetite change, chills and fever.   HENT:  Positive for congestion, postnasal drip, rhinorrhea, sinus pressure and sore throat. Negative for trouble swallowing.    Respiratory:  Positive for cough. Negative for shortness of breath.    Cardiovascular:  Negative for chest pain and palpitations.   Gastrointestinal:  Negative for abdominal pain, constipation, diarrhea, nausea and vomiting.   Genitourinary:  Negative for difficulty urinating.   Musculoskeletal:  Positive for arthralgias and myalgias.   Skin:  Negative for color change and rash.   Neurological:  Positive for headaches.   All other systems reviewed and are negative.       Objective      /60   Pulse (!) 58   Temp 98 °F (36.7 °C)   Ht 6' (1.829 m)   Wt 76.8 kg (169 lb 5 oz)   BMI 22.96 kg/m²   Ht Readings from Last 3 Encounters:   12/06/22 6' (1.829 m)   11/01/22 6' (1.829 m)   10/14/22 6' (1.829 m)     Wt Readings from Last 3 Encounters:   12/06/22 76.8 kg (169 lb 5 oz)   11/01/22 76.7 kg (169 lb 1.5 oz)   10/14/22 75.5 kg (166 lb 7.2 oz)       PHYSICAL EXAM:  Physical Exam  Vitals and nursing note reviewed.   Constitutional:       General: He is not in acute distress.     Appearance: He is ill-appearing.   HENT: "      Head: Normocephalic and atraumatic.      Right Ear: Tympanic membrane, ear canal and external ear normal.      Left Ear: Tympanic membrane, ear canal and external ear normal.      Nose: Nose normal. No congestion or rhinorrhea.      Mouth/Throat:      Mouth: Mucous membranes are moist.      Pharynx: Oropharynx is clear. No oropharyngeal exudate or posterior oropharyngeal erythema.   Eyes:      Extraocular Movements: Extraocular movements intact.      Conjunctiva/sclera: Conjunctivae normal.      Pupils: Pupils are equal, round, and reactive to light.   Cardiovascular:      Rate and Rhythm: Normal rate and regular rhythm.   Pulmonary:      Effort: Pulmonary effort is normal.      Breath sounds: Examination of the left-lower field reveals rales. Rales present. No wheezing or rhonchi.   Musculoskeletal:         General: Normal range of motion.      Cervical back: Normal range of motion.   Lymphadenopathy:      Cervical: No cervical adenopathy.   Skin:     General: Skin is warm and dry.   Neurological:      General: No focal deficit present.      Mental Status: He is alert.            LABS / IMAGING:  Recent Results (from the past 4368 hour(s))   Aerobic culture    Collection Time: 07/22/22 10:00 AM    Specimen: Conjunctiva, Right   Result Value Ref Range    Aerobic Bacterial Culture Skin yolis,  no predominant organism    CBC W/ AUTO DIFFERENTIAL    Collection Time: 08/01/22  7:56 AM   Result Value Ref Range    WBC 5.22 3.90 - 12.70 K/uL    RBC 4.04 (L) 4.60 - 6.20 M/uL    Hemoglobin 12.3 (L) 14.0 - 18.0 g/dL    Hematocrit 38.5 (L) 40.0 - 54.0 %    MCV 95 82 - 98 fL    MCH 30.4 27.0 - 31.0 pg    MCHC 31.9 (L) 32.0 - 36.0 g/dL    RDW 14.6 (H) 11.5 - 14.5 %    Platelets 182 150 - 450 K/uL    MPV 11.0 9.2 - 12.9 fL    Immature Granulocytes 0.4 0.0 - 0.5 %    Gran # (ANC) 2.8 1.8 - 7.7 K/uL    Immature Grans (Abs) 0.02 0.00 - 0.04 K/uL    Lymph # 1.4 1.0 - 4.8 K/uL    Mono # 0.6 0.3 - 1.0 K/uL    Eos # 0.3 0.0 - 0.5  K/uL    Baso # 0.06 0.00 - 0.20 K/uL    nRBC 0 0 /100 WBC    Gran % 54.5 38.0 - 73.0 %    Lymph % 26.6 18.0 - 48.0 %    Mono % 11.1 4.0 - 15.0 %    Eosinophil % 6.3 0.0 - 8.0 %    Basophil % 1.1 0.0 - 1.9 %    Differential Method Automated    BASIC METABOLIC PANEL    Collection Time: 08/01/22  7:56 AM   Result Value Ref Range    Sodium 137 136 - 145 mmol/L    Potassium 4.3 3.5 - 5.1 mmol/L    Chloride 104 95 - 110 mmol/L    CO2 24 23 - 29 mmol/L    Glucose 107 70 - 110 mg/dL    BUN 12 10 - 30 mg/dL    Creatinine 0.8 0.5 - 1.4 mg/dL    Calcium 9.0 8.7 - 10.5 mg/dL    Anion Gap 9 8 - 16 mmol/L    eGFR >60.0 >60 mL/min/1.73 m^2   CULTURE, TISSUE    Collection Time: 11/01/22  9:19 AM    Specimen: Leg, Left; Tissue   Result Value Ref Range    Aerobic Culture - Tissue (A)      METHICILLIN RESISTANT STAPHYLOCOCCUS AUREUS  Many      Gram Stain Result Rare WBC's     Gram Stain Result Few Gram positive cocci        Susceptibility    Methicillin resistant Staphylococcus aureus - CULTURE, TISSUE     Clindamycin <=0.5 Sensitive mcg/mL     Erythromycin 2 Intermediate mcg/mL     Oxacillin >2 Resistant mcg/mL     Penicillin >8 Resistant mcg/mL     Trimeth/Sulfa <=0.5/9.5 Sensitive mcg/mL     Tetracycline <=4 Sensitive mcg/mL     Vancomycin 1 Sensitive mcg/mL   POCT COVID-19 Rapid Screening    Collection Time: 12/06/22  9:20 AM   Result Value Ref Range    POC Rapid COVID Negative Negative     Acceptable Yes          Assessment    1. URI with cough and congestion          Plan Claude was seen today for cough and nasal congestion.    Diagnoses and all orders for this visit:    URI with cough and congestion  -     POCT COVID-19 Rapid Screening  -     X-Ray Chest PA And Lateral; Future  -     Discontinue: azithromycin (Z-SASHA) 250 MG tablet; Take 2 tablets by mouth on day 1; Take 1 tablet by mouth on days 2-5  -     Discontinue: guaiFENesin (MUCINEX) 600 mg 12 hr tablet; Take 2 tablets (1,200 mg total) by mouth 2 (two)  times daily. for 10 days  -     Discontinue: benzonatate (TESSALON) 200 MG capsule; Take 1 capsule (200 mg total) by mouth 3 (three) times daily as needed for Cough.  -     Discontinue: promethazine-dextromethorphan (PROMETHAZINE-DM) 6.25-15 mg/5 mL Syrp; Take 5 mLs by mouth every 6 (six) hours as needed (cough).  -     azithromycin (Z-SASHA) 250 MG tablet; Take 2 tablets by mouth on day 1; Take 1 tablet by mouth on days 2-5  -     benzonatate (TESSALON) 200 MG capsule; Take 1 capsule (200 mg total) by mouth 3 (three) times daily as needed for Cough.  -     promethazine-dextromethorphan (PROMETHAZINE-DM) 6.25-15 mg/5 mL Syrp; Take 5 mLs by mouth every 6 (six) hours as needed (cough).  -     guaiFENesin (MUCINEX) 600 mg 12 hr tablet; Take 2 tablets (1,200 mg total) by mouth 2 (two) times daily. for 10 days    Physically, he looks like he feels bad.  Frequent coughing during exam.      Chest x-ray today.  Shows some mild infiltrates, possible early pneumonia.    Will go ahead and treat with azithromycin today.  Rest, fluids, Tylenol.  Mucinex, antihistamine.  Cough medicine sent to pharmacy.      FOLLOW-UP:  Follow up if symptoms worsen or fail to improve.    I spent a total of 45 minutes face to face and non-face to face on the date of this visit.This includes time preparing to see the patient (eg, review of tests, notes), obtaining and/or reviewing additional history from an independent historian and/or outside medical records, documenting clinical information in the electronic health record, independently interpreting results and/or communicating results to the patient/family/caregiver, or care coordinator.    Signed by:  Hima Cannon MD

## 2022-12-21 ENCOUNTER — TELEPHONE (OUTPATIENT)
Dept: ADMINISTRATIVE | Facility: HOSPITAL | Age: 87
End: 2022-12-21
Payer: MEDICARE

## 2023-02-23 ENCOUNTER — OFFICE VISIT (OUTPATIENT)
Dept: PRIMARY CARE CLINIC | Facility: CLINIC | Age: 88
End: 2023-02-23
Payer: MEDICARE

## 2023-02-23 VITALS
WEIGHT: 167.56 LBS | BODY MASS INDEX: 22.69 KG/M2 | HEIGHT: 72 IN | TEMPERATURE: 98 F | HEART RATE: 65 BPM | DIASTOLIC BLOOD PRESSURE: 78 MMHG | SYSTOLIC BLOOD PRESSURE: 130 MMHG

## 2023-02-23 DIAGNOSIS — Z74.09 LIMITED MOBILITY: ICD-10-CM

## 2023-02-23 DIAGNOSIS — L98.9 SKIN LESION: ICD-10-CM

## 2023-02-23 DIAGNOSIS — G47.9 SLEEP DIFFICULTIES: ICD-10-CM

## 2023-02-23 DIAGNOSIS — M25.50 MULTIPLE JOINT PAIN: ICD-10-CM

## 2023-02-23 DIAGNOSIS — L29.9 ITCHING: Primary | ICD-10-CM

## 2023-02-23 PROCEDURE — 1126F AMNT PAIN NOTED NONE PRSNT: CPT | Mod: CPTII,S$GLB,, | Performed by: FAMILY MEDICINE

## 2023-02-23 PROCEDURE — 1159F PR MEDICATION LIST DOCUMENTED IN MEDICAL RECORD: ICD-10-PCS | Mod: CPTII,S$GLB,, | Performed by: FAMILY MEDICINE

## 2023-02-23 PROCEDURE — 1101F PR PT FALLS ASSESS DOC 0-1 FALLS W/OUT INJ PAST YR: ICD-10-PCS | Mod: CPTII,S$GLB,, | Performed by: FAMILY MEDICINE

## 2023-02-23 PROCEDURE — 1159F MED LIST DOCD IN RCRD: CPT | Mod: CPTII,S$GLB,, | Performed by: FAMILY MEDICINE

## 2023-02-23 PROCEDURE — 3288F FALL RISK ASSESSMENT DOCD: CPT | Mod: CPTII,S$GLB,, | Performed by: FAMILY MEDICINE

## 2023-02-23 PROCEDURE — 1126F PR PAIN SEVERITY QUANTIFIED, NO PAIN PRESENT: ICD-10-PCS | Mod: CPTII,S$GLB,, | Performed by: FAMILY MEDICINE

## 2023-02-23 PROCEDURE — 99215 PR OFFICE/OUTPT VISIT, EST, LEVL V, 40-54 MIN: ICD-10-PCS | Mod: S$GLB,,, | Performed by: FAMILY MEDICINE

## 2023-02-23 PROCEDURE — 99999 PR PBB SHADOW E&M-EST. PATIENT-LVL IV: ICD-10-PCS | Mod: PBBFAC,,, | Performed by: FAMILY MEDICINE

## 2023-02-23 PROCEDURE — 3288F PR FALLS RISK ASSESSMENT DOCUMENTED: ICD-10-PCS | Mod: CPTII,S$GLB,, | Performed by: FAMILY MEDICINE

## 2023-02-23 PROCEDURE — 99215 OFFICE O/P EST HI 40 MIN: CPT | Mod: S$GLB,,, | Performed by: FAMILY MEDICINE

## 2023-02-23 PROCEDURE — 1101F PT FALLS ASSESS-DOCD LE1/YR: CPT | Mod: CPTII,S$GLB,, | Performed by: FAMILY MEDICINE

## 2023-02-23 PROCEDURE — 99999 PR PBB SHADOW E&M-EST. PATIENT-LVL IV: CPT | Mod: PBBFAC,,, | Performed by: FAMILY MEDICINE

## 2023-02-23 RX ORDER — HYDROXYZINE PAMOATE 25 MG/1
50 CAPSULE ORAL NIGHTLY PRN
Qty: 180 CAPSULE | Refills: 3 | Status: SHIPPED | OUTPATIENT
Start: 2023-02-23 | End: 2024-03-18

## 2023-02-23 NOTE — PATIENT INSTRUCTIONS
Physically, everything looks pretty good today.      For the itching and sleep issues, let us try using hydroxyzine.  You can take either one or two capsules at bedtime.  Start with one capsule.  If it helps with the itching, and help you fall asleep, that is perfect.  If needed, you can take a 2nd capsule.      For the lesion between the buttocks, start by getting a foam donut from the pharmacy.  Use that when you sit down to take the pressure off of the area.      Try soaking in a warm bathtub with some Epsom salts once or twice a day.  After soaking, gently clean the area with soap and water, rinse thoroughly, and pat dry.  You can try using a barrier cream, such as Farrukh's or A&D ointment to help the area heal.  If it worsens, please let me know and we can always get you in with the surgeon to take another look.      Speaking of surgeon, I will ask around to see if you can get the hernia repaired at the same time as the stimulator removed.      Referral placed for [facility] therapy to help with strengthening, conditioning, and joint pains.      You can also shift your doses of gabapentin to see if it helps sleep, toe pain, etc..  Try taking two in the morning, two mid day, then three or four at bedtime.

## 2023-03-05 NOTE — PROGRESS NOTES
"    [facility]       [address] [place] [address]      Phone: [phone]      Fax: [phone]    Hima Cannon MD                Office Visit  02/23/2023        Subjective      HPI:  Claude L Taylor is a 89 y.o. male presents today in clinic for "Follow-up  ."     89-year-old gentleman presents today for checkup, discuss multiple issues.      His wife, Ms. Quiroga, is present with him today.  She provides portion of the history.    Patient states that old age done set in.  Reports some trouble walking.  Seems to be getting worse.  Tends to loses balance easily.  Gets weak, especially in the knees.  Knees pop sometimes.  Legs feel like they want to give out.  Right knee is worse than the left.  Can not work outside if standing.  Has a rolling walker that he uses at home.  Cane that he uses when going out.  Toes feel stiff, especially at night.  Right foot seems to be worse than the left.  Sometimes he can remove his foot from under the covers which eases the discomfort.  Would like to see Physical therapy, do some strengthening exercises, balance eval.    States that he has a spinal stimulator planted in his lower back.  Has been there for years.  Has been turned off quite some time.  Would like to know about getting it possibly removed.  When he lays on his back, or on his right side, he can feel the device.  Causes some mild discomfort.    Also has some discomfort in his left hip, when he lays on it.  Otherwise, it does not bother him.    Was having difficulty with his left hand.  Saw Dr. Hernandez, orthopedist, at [facility].  Had surgery done, but does not seem to have gotten good relief.  Orthopedist did tell him the recovery may take several months.  Now, he is noticing the discomfort starting to subside, getting better.  Does not have follow-up appointment scheduled yet.      Dry eyes have been a problem for him over the last two years.  [facility] and others outside of [facility] with no significant improvement.  Saw a commercial on TV for Dr. Nelson, so he went to see him at [facility].  He is provided for episodes of intensive light treatment, which apparently has been helping significantly.  Needed a 5th treatment, so Dr. Nelson did that for no charge.  Starts to feel the dry eyes coming back, so he plans to contact Dr. Nelson to see about an additional treatment.    Reports a small bulge, hernia, in his lower, right groin.  Does not bother him very often.  Was wondering if it would be possible to get the hernia repaired at the same time as having the spinal stimulator removed?      Has a sore at the bottom of his spine, between his buttocks.  Has been there for couple of weeks.  No drainage, leaking.  Sitting makes it worse.  Has been using cortisone 10 and Neosporin, but no significant improvement.    Also reports problems with itching.  Both thighs, back, armpits, stomach area.  Typically, only happens when he wears pants with a tight belt.  Has tried various OTC creams, lotions.  These help only temporarily.  Sees Dr. Morfin, dermatology.    Has also been having a hard time going to sleep at night.  Has trouble falling asleep.  Has been taking Tylenol PM with no relief.  Knees, legs, feel let restless.    PMH:  CAD, AFib, PVD, BPH, OA/DDD/chronic pains.  PSH:  Pacemaker, heart ablation/heart valve repair/heart catheterization, CABG, tonsils, multiple MSK (back, foot), gallbladder  Allergies:  Oxycodone makes him very emotional  Social:  Retired.  .  T:  Denies  A:  Occasionally  D:  Denies    Exercise:  No regular exercise program.  Has rolling walker and scooter to help get around.    Cardiology:  Dr. Barrios  Ophthalmology:  Dr. Brown, Dr. Sampson  Dermatology: Dr. Morfin      The following were updated and reviewed by myself in the chart: medications, past medical history, past surgical history, family history, social  history, and allergies.     Medications:  Current Outpatient Medications on File Prior to Visit   Medication Sig Dispense Refill    diclofenac sodium (VOLTAREN) 1 % Gel Apply 2 g topically daily as needed (foot pain). 100 g 2    fluorometholone 0.1% (FML) 0.1 % DrpS Place 1 drop into both eyes once daily. 15 mL 0    fluorometholone 0.1% (FML) 0.1 % DrpS Place 1 drop in both eyes every morning 5 mL 0    furosemide (LASIX) 20 MG tablet Take 1 tablet (20 mg total) by mouth daily as needed. 180 tablet 3    gabapentin (NEURONTIN) 100 MG capsule Take 2 capsules in the morning, 2 capsules at  mid-day, and 3 capsule by mouth at bedtime for neuropathy (Patient taking differently: Take 2 capsules in the morning, 2 capsules at  mid-day, and 3 capsule by mouth at bedtime for neuropathy) 630 capsule 3    latanoprost 0.005 % ophthalmic solution Place 1 drop into both eyes every evening. 7.5 mL 0    latanoprost 0.005 % ophthalmic solution Instill 1 drop in each eye every night at bedtime. 7.5 mL 3    levocetirizine (XYZAL) 5 MG tablet Take 1 tablet by mouth every morning for itching 30 tablet 6    loteprednol (LOTEMAX) 0.5 % ophthalmic suspension Place 1 drop into both eyes every morning. 45 mL 1    propylene glycol/peg 400/PF (SYSTANE, PF, OPHT) Apply to eye.      tamsulosin (FLOMAX) 0.4 mg Cap Take 2 capsules (0.8 mg total) by mouth every evening. For prostate and urine flow 180 capsule 3    warfarin (COUMADIN) 5 MG tablet Take 1 tablet by mouth every day or as directed by coumadin clinic 90 tablet 1     No current facility-administered medications on file prior to visit.        PMHx:  Past Medical History:   Diagnosis Date    *Atrial fibrillation     pacer Dr Barrios    Anticoagulant long-term use     Cancer     skin    Cervical neck pain with evidence of disc disease     Colon polyps 12/14/2015    Coronary artery disease     Depression     Diverticulosis     Hypertension     Kidney stone     Skin cancer       Patient Active  Problem List    Diagnosis Date Noted    Positive SHOAIB (antinuclear antibody) 05/02/2022    Keratoconjunctivitis sicca due to decreased tear production, bilateral 05/02/2022    Primary osteoarthritis involving multiple joints 05/02/2022    Dependence on other enabling machines and devices 11/09/2021    Thrombocytopenia, unspecified 05/03/2021    Atrial fibrillation, chronic 05/03/2021    Osteoporosis 05/03/2021    Sacroiliitis 09/25/2019    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    SOB (shortness of breath) on exertion 03/27/2019    Benign prostatic hyperplasia with weak urinary stream 03/27/2019    Cardiac pacemaker 07/31/2018    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    Nonrheumatic mitral (valve) insufficiency 07/31/2018    Peripheral vascular disease 07/31/2018    Hx of CABG 05/23/2018    Spinal stenosis of lumbar region at multiple levels 05/23/2018    DDD (degenerative disc disease), lumbosacral 05/23/2018    Proximal muscle weakness 05/23/2018    OAB (overactive bladder) 05/23/2018    Coronary artery disease involving native coronary artery of native heart without angina pectoris 10/05/2017    Permanent atrial fibrillation 10/05/2017    Anticoagulated on Coumadin 10/05/2017    B12 deficiency due to diet 10/05/2017    Primary osteoarthritis of both knees 07/14/2017    Chronic constipation 09/26/2016    Anismus 12/14/2015    Diverticulosis of large intestine without hemorrhage 12/14/2015    Altered bowel habits 12/13/2015    Chronic back pain 09/15/2015    Osteoarthritis 11/18/2014    Long term (current) use of anticoagulants 11/06/2014    Asthma in adult 01/14/2014    Coronary artery disease involving native coronary artery of native heart with angina pectoris 12/10/2013    Depression         PSHx:  Past Surgical History:   Procedure Laterality Date    back fusion      CARDIAC PACEMAKER PLACEMENT      CARDIAC VALVE REPLACEMENT      CHOLECYSTECTOMY      COLONOSCOPY  3/22/2013    COLONOSCOPY N/A 12/14/2015    Procedure:  "COLONOSCOPY;  Surgeon: Gonzalo Gorman MD;  Location: [facility];  Service: Endoscopy;  Laterality: N/A;    CORONARY ARTERY BYPASS GRAFT      ELBOW BURSA SURGERY      left    EYE SURGERY      FOOT SURGERY      MITRAL VALVE REPLACEMENT      REPLACEMENT OF PACEMAKER  09/2019    SHOULDER SURGERY      SKIN CANCER EXCISION  01/2018/   04/2018 Dr.Thorla Jones    lower left leg     SPINE SURGERY  3 surgeries    TONSILLECTOMY          FHx:  Family History   Problem Relation Age of Onset    Cancer Father     Arthritis Father     Colon cancer Neg Hx         Social:  Social History     Socioeconomic History    Marital status:      Spouse name: marguerite Barron   Tobacco Use    Smoking status: Never    Smokeless tobacco: Never    Tobacco comments:     never a smoker   Substance and Sexual Activity    Alcohol use: Not Currently     Alcohol/week: 2.0 standard drinks     Types: 1 Glasses of wine, 1 Cans of beer per week     Comment: drinks above only with meals ocassionaly    Drug use: Never    Sexual activity: Not Currently     Partners: Female     Comment: too old at 89        Allergies:  Review of patient's allergies indicates:   Allergen Reactions    Oxycodone      "it's mental/emotional        ROS:  Review of Systems   Constitutional:  Negative for activity change, appetite change, chills and fever.   HENT:  Negative for congestion, postnasal drip, rhinorrhea, sore throat and trouble swallowing.    Respiratory:  Negative for cough and shortness of breath.    Cardiovascular:  Negative for chest pain and palpitations.   Gastrointestinal:  Negative for abdominal pain, constipation, diarrhea, nausea and vomiting.   Genitourinary:  Negative for difficulty urinating.   Musculoskeletal:  Positive for arthralgias, back pain and myalgias.   Skin:  Positive for color change and wound.   Neurological:  Negative for headaches.   Psychiatric/Behavioral:  Positive for sleep disturbance.    All other systems reviewed and are negative. "       Objective      /78   Pulse 65   Temp 98 °F (36.7 °C)   Ht 6' (1.829 m)   Wt 76 kg (167 lb 8.8 oz)   BMI 22.72 kg/m²   Ht Readings from Last 3 Encounters:   02/23/23 6' (1.829 m)   12/06/22 6' (1.829 m)   11/01/22 6' (1.829 m)     Wt Readings from Last 3 Encounters:   02/23/23 76 kg (167 lb 8.8 oz)   12/06/22 76.8 kg (169 lb 5 oz)   11/01/22 76.7 kg (169 lb 1.5 oz)       PHYSICAL EXAM:  Physical Exam  Vitals and nursing note reviewed.   Constitutional:       General: He is not in acute distress.     Appearance: Normal appearance.   HENT:      Head: Normocephalic and atraumatic.      Right Ear: Tympanic membrane, ear canal and external ear normal.      Left Ear: Tympanic membrane, ear canal and external ear normal.      Nose: Nose normal. No congestion or rhinorrhea.      Mouth/Throat:      Mouth: Mucous membranes are moist.      Pharynx: Oropharynx is clear. No oropharyngeal exudate or posterior oropharyngeal erythema.   Eyes:      Extraocular Movements: Extraocular movements intact.      Conjunctiva/sclera: Conjunctivae normal.      Pupils: Pupils are equal, round, and reactive to light.   Cardiovascular:      Rate and Rhythm: Normal rate and regular rhythm.   Pulmonary:      Effort: Pulmonary effort is normal.      Breath sounds: No wheezing, rhonchi or rales.   Musculoskeletal:         General: Normal range of motion.      Cervical back: Normal range of motion.   Lymphadenopathy:      Cervical: No cervical adenopathy.   Skin:     General: Skin is warm and dry.      Findings: Wound present.             Comments: Small abrasion/fissure in the gluteal cleft.  No drainage.  Tender to palpation.  No fluctuance.   Neurological:      General: No focal deficit present.      Mental Status: He is alert.            LABS / IMAGING:  Recent Results (from the past 4368 hour(s))   CULTURE, TISSUE    Collection Time: 11/01/22  9:19 AM    Specimen: Leg, Left; Tissue   Result Value Ref Range    Aerobic Culture -  Tissue (A)      METHICILLIN RESISTANT STAPHYLOCOCCUS AUREUS  Many      Gram Stain Result Rare WBC's     Gram Stain Result Few Gram positive cocci        Susceptibility    Methicillin resistant Staphylococcus aureus - CULTURE, TISSUE     Clindamycin <=0.5 Sensitive mcg/mL     Erythromycin 2 Intermediate mcg/mL     Oxacillin >2 Resistant mcg/mL     Penicillin >8 Resistant mcg/mL     Trimeth/Sulfa <=0.5/9.5 Sensitive mcg/mL     Tetracycline <=4 Sensitive mcg/mL     Vancomycin 1 Sensitive mcg/mL   POCT COVID-19 Rapid Screening    Collection Time: 12/06/22  9:20 AM   Result Value Ref Range    POC Rapid COVID Negative Negative     Acceptable Yes          Assessment    1. Itching    2. Sleep difficulties    3. Skin lesion    4. Multiple joint pain    5. Limited mobility          Plan    Claude was seen today for follow-up.    Diagnoses and all orders for this visit:    Itching  -     hydrOXYzine pamoate (VISTARIL) 25 MG Cap; Take 2 capsules (50 mg total) by mouth nightly as needed (itching, sleep).    Sleep difficulties  -     hydrOXYzine pamoate (VISTARIL) 25 MG Cap; Take 2 capsules (50 mg total) by mouth nightly as needed (itching, sleep).    Skin lesion    Multiple joint pain  -     Ambulatory referral/consult to Physical/Occupational Therapy; Future    Limited mobility  -     Ambulatory referral/consult to Physical/Occupational Therapy; Future      Physically, most things look okay today.      For itching, will try using hydroxyzine.  This may also help with sleep.    For the skin lesion, recommended foam donut to offload pressure.  Recommended soaking in warm bathtub with Epsom salt.  Clean area gently with soap and water.  OTC barrier cream.  If no improvement, can always refer to General surgery.      Referral to physical therapy for balance, strength eval.    Alter scheduled for gabapentin to see if that helps with sleep, toe pain.  Two capsules in a.m., two at mid day, 3/4 at  bedtime.    FOLLOW-UP:  Follow up in about 4 months (around 6/23/2023) for check up.    I spent a total of 45 minutes face to face and non-face to face on the date of this visit.This includes time preparing to see the patient (eg, review of tests, notes), obtaining and/or reviewing additional history from an independent historian and/or outside medical records, documenting clinical information in the electronic health record, independently interpreting results and/or communicating results to the patient/family/caregiver, or care coordinator.    Signed by:  Hima Cannon MD

## 2023-03-13 DIAGNOSIS — M48.061 SPINAL STENOSIS OF LUMBAR REGION AT MULTIPLE LEVELS: ICD-10-CM

## 2023-03-14 RX ORDER — TAMSULOSIN HYDROCHLORIDE 0.4 MG/1
0.8 CAPSULE ORAL NIGHTLY
Qty: 180 CAPSULE | Refills: 3 | Status: SHIPPED | OUTPATIENT
Start: 2023-03-14 | End: 2024-03-07 | Stop reason: SDUPTHER

## 2023-03-14 RX ORDER — GABAPENTIN 100 MG/1
CAPSULE ORAL
Qty: 630 CAPSULE | Refills: 3 | Status: SHIPPED | OUTPATIENT
Start: 2023-03-14 | End: 2023-06-23 | Stop reason: ALTCHOICE

## 2023-03-30 ENCOUNTER — OFFICE VISIT (OUTPATIENT)
Dept: PRIMARY CARE CLINIC | Facility: CLINIC | Age: 88
End: 2023-03-30
Payer: MEDICARE

## 2023-03-30 ENCOUNTER — LAB VISIT (OUTPATIENT)
Dept: LAB | Facility: HOSPITAL | Age: 88
End: 2023-03-30
Payer: MEDICARE

## 2023-03-30 VITALS
OXYGEN SATURATION: 100 % | HEIGHT: 69 IN | BODY MASS INDEX: 25.01 KG/M2 | RESPIRATION RATE: 15 BRPM | DIASTOLIC BLOOD PRESSURE: 78 MMHG | TEMPERATURE: 98 F | WEIGHT: 168.88 LBS | SYSTOLIC BLOOD PRESSURE: 112 MMHG | HEART RATE: 60 BPM

## 2023-03-30 DIAGNOSIS — L29.9 ITCHING: ICD-10-CM

## 2023-03-30 DIAGNOSIS — R06.02 SOB (SHORTNESS OF BREATH) ON EXERTION: ICD-10-CM

## 2023-03-30 DIAGNOSIS — M51.37 DDD (DEGENERATIVE DISC DISEASE), LUMBOSACRAL: ICD-10-CM

## 2023-03-30 DIAGNOSIS — G47.9 SLEEP DIFFICULTIES: ICD-10-CM

## 2023-03-30 DIAGNOSIS — R53.83 FATIGUE, UNSPECIFIED TYPE: ICD-10-CM

## 2023-03-30 DIAGNOSIS — R53.83 FATIGUE, UNSPECIFIED TYPE: Primary | ICD-10-CM

## 2023-03-30 LAB
BASOPHILS # BLD AUTO: 0.11 K/UL (ref 0–0.2)
BASOPHILS NFR BLD: 1.7 % (ref 0–1.9)
DIFFERENTIAL METHOD: ABNORMAL
EOSINOPHIL # BLD AUTO: 0.4 K/UL (ref 0–0.5)
EOSINOPHIL NFR BLD: 6.8 % (ref 0–8)
ERYTHROCYTE [DISTWIDTH] IN BLOOD BY AUTOMATED COUNT: 14.2 % (ref 11.5–14.5)
HCT VFR BLD AUTO: 39.2 % (ref 40–54)
HGB BLD-MCNC: 12.8 G/DL (ref 14–18)
IMM GRANULOCYTES # BLD AUTO: 0.01 K/UL (ref 0–0.04)
IMM GRANULOCYTES NFR BLD AUTO: 0.2 % (ref 0–0.5)
LYMPHOCYTES # BLD AUTO: 1.5 K/UL (ref 1–4.8)
LYMPHOCYTES NFR BLD: 22.3 % (ref 18–48)
MCH RBC QN AUTO: 30.4 PG (ref 27–31)
MCHC RBC AUTO-ENTMCNC: 32.7 G/DL (ref 32–36)
MCV RBC AUTO: 93 FL (ref 82–98)
MONOCYTES # BLD AUTO: 0.6 K/UL (ref 0.3–1)
MONOCYTES NFR BLD: 9.4 % (ref 4–15)
NEUTROPHILS # BLD AUTO: 3.9 K/UL (ref 1.8–7.7)
NEUTROPHILS NFR BLD: 59.6 % (ref 38–73)
NRBC BLD-RTO: 0 /100 WBC
PLATELET # BLD AUTO: 165 K/UL (ref 150–450)
PMV BLD AUTO: 11.3 FL (ref 9.2–12.9)
RBC # BLD AUTO: 4.21 M/UL (ref 4.6–6.2)
WBC # BLD AUTO: 6.5 K/UL (ref 3.9–12.7)

## 2023-03-30 PROCEDURE — 83880 ASSAY OF NATRIURETIC PEPTIDE: CPT | Performed by: FAMILY MEDICINE

## 2023-03-30 PROCEDURE — 1160F RVW MEDS BY RX/DR IN RCRD: CPT | Mod: CPTII,S$GLB,, | Performed by: FAMILY MEDICINE

## 2023-03-30 PROCEDURE — 99999 PR PBB SHADOW E&M-EST. PATIENT-LVL V: ICD-10-PCS | Mod: PBBFAC,,, | Performed by: FAMILY MEDICINE

## 2023-03-30 PROCEDURE — 36415 COLL VENOUS BLD VENIPUNCTURE: CPT | Mod: PN | Performed by: FAMILY MEDICINE

## 2023-03-30 PROCEDURE — 1126F AMNT PAIN NOTED NONE PRSNT: CPT | Mod: CPTII,S$GLB,, | Performed by: FAMILY MEDICINE

## 2023-03-30 PROCEDURE — 3288F PR FALLS RISK ASSESSMENT DOCUMENTED: ICD-10-PCS | Mod: CPTII,S$GLB,, | Performed by: FAMILY MEDICINE

## 2023-03-30 PROCEDURE — 1126F PR PAIN SEVERITY QUANTIFIED, NO PAIN PRESENT: ICD-10-PCS | Mod: CPTII,S$GLB,, | Performed by: FAMILY MEDICINE

## 2023-03-30 PROCEDURE — 84443 ASSAY THYROID STIM HORMONE: CPT | Performed by: FAMILY MEDICINE

## 2023-03-30 PROCEDURE — 99999 PR PBB SHADOW E&M-EST. PATIENT-LVL V: CPT | Mod: PBBFAC,,, | Performed by: FAMILY MEDICINE

## 2023-03-30 PROCEDURE — 3288F FALL RISK ASSESSMENT DOCD: CPT | Mod: CPTII,S$GLB,, | Performed by: FAMILY MEDICINE

## 2023-03-30 PROCEDURE — 1159F PR MEDICATION LIST DOCUMENTED IN MEDICAL RECORD: ICD-10-PCS | Mod: CPTII,S$GLB,, | Performed by: FAMILY MEDICINE

## 2023-03-30 PROCEDURE — 1159F MED LIST DOCD IN RCRD: CPT | Mod: CPTII,S$GLB,, | Performed by: FAMILY MEDICINE

## 2023-03-30 PROCEDURE — 99215 PR OFFICE/OUTPT VISIT, EST, LEVL V, 40-54 MIN: ICD-10-PCS | Mod: S$GLB,,, | Performed by: FAMILY MEDICINE

## 2023-03-30 PROCEDURE — 1101F PR PT FALLS ASSESS DOC 0-1 FALLS W/OUT INJ PAST YR: ICD-10-PCS | Mod: CPTII,S$GLB,, | Performed by: FAMILY MEDICINE

## 2023-03-30 PROCEDURE — 99215 OFFICE O/P EST HI 40 MIN: CPT | Mod: S$GLB,,, | Performed by: FAMILY MEDICINE

## 2023-03-30 PROCEDURE — 1101F PT FALLS ASSESS-DOCD LE1/YR: CPT | Mod: CPTII,S$GLB,, | Performed by: FAMILY MEDICINE

## 2023-03-30 PROCEDURE — 1160F PR REVIEW ALL MEDS BY PRESCRIBER/CLIN PHARMACIST DOCUMENTED: ICD-10-PCS | Mod: CPTII,S$GLB,, | Performed by: FAMILY MEDICINE

## 2023-03-30 PROCEDURE — 80053 COMPREHEN METABOLIC PANEL: CPT | Performed by: FAMILY MEDICINE

## 2023-03-30 PROCEDURE — 85025 COMPLETE CBC W/AUTO DIFF WBC: CPT | Performed by: FAMILY MEDICINE

## 2023-03-30 RX ORDER — TRIAMCINOLONE ACETONIDE 1 MG/G
CREAM TOPICAL 2 TIMES DAILY PRN
Qty: 30 G | Refills: 2 | Status: SHIPPED | OUTPATIENT
Start: 2023-03-30 | End: 2023-06-23 | Stop reason: SDUPTHER

## 2023-03-30 NOTE — PROGRESS NOTES
"    [facility]       [address] [place] [address]      Phone: [phone]      Fax: [phone]    Hima Cannon MD                Office Visit  03/30/2023        Subjective      HPI:  Claude L Taylor is a 89 y.o. male presents today in clinic for "Follow-up  ."     89-year-old gentleman presents today for checkup, discuss multiple issues.      His wife, Ms. Quiroga, is present with him today.  She provides portion of the history.    Patient states that last Friday he could not sleep.  He got up in went in his living room to sit in his lounge chair.  Eventually, he got tired, so he got up to walk back to bed.  When he did, he tripped over his cane.  The cane scratched his leg.  Has a small scrape.  Healing well.  No signs of infection.      He states his legs are weak.  Often wobbly.  He went to physical therapy for six visits.  Did not seem to be helping, so he discontinued.      States that he is frequently tired.  Gets really sleepy after meals.    Has been taking his gabapentin.  Wonders if it is doing anything for him?  States he takes about 700 mg each day.  Two capsules in the morning, three capsules around mid day, two capsules at bedtime (2-3-two).    Still has intermittent issues with low back pain.  Mostly on the right side.  Does not radiate to the legs.    Still finds that movement, exertion, frequent activity makes himself short of breath.  Has been going on for years.  Saw his cardiologist who says his heart is okay.    Lastly, he reports that he is still itchy.  Itch goes across his belt line.  Underneath both thighs.  Sometimes his back will itch.  Has been taking hydroxyzine, but it is not helping.  Has tried other Vaseline, ointments with no relief.    PMH:  CAD, AFib, PVD, BPH, OA/DDD/chronic pains.  PSH:  Pacemaker, heart ablation/heart valve repair/heart catheterization, CABG, tonsils, multiple MSK (back, foot), gallbladder  Allergies:  " Oxycodone makes him very emotional  Social:  Retired.  .  T:  Denies  A:  Occasionally  D:  Denies    Exercise:  No regular exercise program.  Has rolling walker and scooter to help get around.    Cardiology:  Dr. Barrios  Ophthalmology:  Dr. Brown, Dr. Sampson  Dermatology: Dr. Morfin      The following were updated and reviewed by myself in the chart: medications, past medical history, past surgical history, family history, social history, and allergies.     Medications:  Current Outpatient Medications on File Prior to Visit   Medication Sig Dispense Refill    diclofenac sodium (VOLTAREN) 1 % Gel Apply 2 g topically daily as needed (foot pain). 100 g 2    fluorometholone 0.1% (FML) 0.1 % DrpS Place 1 drop into both eyes once daily. 15 mL 0    fluorometholone 0.1% (FML) 0.1 % DrpS Place 1 drop in both eyes every morning 5 mL 0    furosemide (LASIX) 20 MG tablet Take 1 tablet (20 mg total) by mouth daily as needed. 180 tablet 3    gabapentin (NEURONTIN) 100 MG capsule Take 2 capsules in the morning, 2 capsules at  mid-day, and 3 capsule by mouth at bedtime for neuropathy 630 capsule 3    hydrOXYzine pamoate (VISTARIL) 25 MG Cap Take 2 capsules (50 mg total) by mouth nightly as needed (itching, sleep). 180 capsule 3    latanoprost 0.005 % ophthalmic solution Place 1 drop into both eyes every evening. 7.5 mL 0    latanoprost 0.005 % ophthalmic solution Instill 1 drop in each eye every night at bedtime. 7.5 mL 3    levocetirizine (XYZAL) 5 MG tablet Take 1 tablet by mouth every morning for itching 30 tablet 6    loteprednol (LOTEMAX) 0.5 % ophthalmic suspension Place 1 drop into both eyes every morning. 45 mL 1    propylene glycol/peg 400/PF (SYSTANE, PF, OPHT) Apply to eye.      tamsulosin (FLOMAX) 0.4 mg Cap Take 2 capsules (0.8 mg total) by mouth every evening. For prostate and urine flow 180 capsule 3    warfarin (COUMADIN) 5 MG tablet Take 1 tablet by mouth every day or as directed by coumadin clinic 90 tablet 1      No current facility-administered medications on file prior to visit.        PMHx:  Past Medical History:   Diagnosis Date    *Atrial fibrillation     pacer Dr Barrios    Anticoagulant long-term use     Cancer     skin    Cervical neck pain with evidence of disc disease     Colon polyps 12/14/2015    Coronary artery disease     Depression     Diverticulosis     Hypertension     Kidney stone     Skin cancer       Patient Active Problem List    Diagnosis Date Noted    Positive SHOAIB (antinuclear antibody) 05/02/2022    Keratoconjunctivitis sicca due to decreased tear production, bilateral 05/02/2022    Primary osteoarthritis involving multiple joints 05/02/2022    Dependence on other enabling machines and devices 11/09/2021    Thrombocytopenia, unspecified 05/03/2021    Atrial fibrillation, chronic 05/03/2021    Osteoporosis 05/03/2021    Sacroiliitis 09/25/2019    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    SOB (shortness of breath) on exertion 03/27/2019    Benign prostatic hyperplasia with weak urinary stream 03/27/2019    Cardiac pacemaker 07/31/2018    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    Nonrheumatic mitral (valve) insufficiency 07/31/2018    Peripheral vascular disease 07/31/2018    Hx of CABG 05/23/2018    Spinal stenosis of lumbar region at multiple levels 05/23/2018    DDD (degenerative disc disease), lumbosacral 05/23/2018    Proximal muscle weakness 05/23/2018    OAB (overactive bladder) 05/23/2018    Coronary artery disease involving native coronary artery of native heart without angina pectoris 10/05/2017    Permanent atrial fibrillation 10/05/2017    Anticoagulated on Coumadin 10/05/2017    B12 deficiency due to diet 10/05/2017    Primary osteoarthritis of both knees 07/14/2017    Chronic constipation 09/26/2016    Anismus 12/14/2015    Diverticulosis of large intestine without hemorrhage 12/14/2015    Altered bowel habits 12/13/2015    Chronic back pain 09/15/2015    Osteoarthritis 11/18/2014    Long term  "(current) use of anticoagulants 11/06/2014    Asthma in adult 01/14/2014    Coronary artery disease involving native coronary artery of native heart with angina pectoris 12/10/2013    Depression         PSHx:  Past Surgical History:   Procedure Laterality Date    back fusion      CARDIAC PACEMAKER PLACEMENT      CARDIAC VALVE REPLACEMENT      CHOLECYSTECTOMY      COLONOSCOPY  03/22/2013    COLONOSCOPY N/A 12/14/2015    Procedure: COLONOSCOPY;  Surgeon: Gonzalo Gorman MD;  Location: [facility];  Service: Endoscopy;  Laterality: N/A;    CORONARY ARTERY BYPASS GRAFT      ELBOW BURSA SURGERY      left    EYE SURGERY      FOOT SURGERY      MITRAL VALVE REPLACEMENT      REPLACEMENT OF PACEMAKER  09/2019    SHOULDER SURGERY      SKIN CANCER EXCISION  01/2018/   04/2018 Dr.Thorla Jones    lower left leg     SPINE SURGERY  3 surgeries    TONSILLECTOMY          FHx:  Family History   Problem Relation Age of Onset    Cancer Father     Arthritis Father     Colon cancer Neg Hx         Social:  Social History     Socioeconomic History    Marital status:      Spouse name: marguerite Barron   Tobacco Use    Smoking status: Never    Smokeless tobacco: Never    Tobacco comments:     never a smoker   Substance and Sexual Activity    Alcohol use: Not Currently     Alcohol/week: 2.0 standard drinks     Types: 1 Glasses of wine, 1 Cans of beer per week     Comment: drinks above only with meals ocassionaly    Drug use: Never    Sexual activity: Not Currently     Partners: Female     Comment: too old at 89        Allergies:  Review of patient's allergies indicates:   Allergen Reactions    Oxycodone      "it's mental/emotional        ROS:  Review of Systems   Constitutional:  Negative for activity change, appetite change, chills and fever.   HENT:  Negative for congestion, postnasal drip, rhinorrhea, sore throat and trouble swallowing.    Respiratory:  Negative for cough and shortness of breath.    Cardiovascular:  Negative for chest " "pain and palpitations.   Gastrointestinal:  Negative for abdominal pain, constipation, diarrhea, nausea and vomiting.   Genitourinary:  Negative for difficulty urinating.   Musculoskeletal:  Positive for arthralgias and back pain. Negative for myalgias.   Skin:  Positive for wound. Negative for color change and rash.   Neurological:  Positive for weakness. Negative for headaches.   All other systems reviewed and are negative.       Objective      /78   Pulse 60   Temp 97.9 °F (36.6 °C) (Temporal)   Resp 15   Ht 5' 9" (1.753 m)   Wt 76.6 kg (168 lb 14 oz)   SpO2 100%   BMI 24.94 kg/m²   Ht Readings from Last 3 Encounters:   03/30/23 5' 9" (1.753 m)   02/23/23 6' (1.829 m)   12/06/22 6' (1.829 m)     Wt Readings from Last 3 Encounters:   03/30/23 76.6 kg (168 lb 14 oz)   02/23/23 76 kg (167 lb 8.8 oz)   12/06/22 76.8 kg (169 lb 5 oz)       PHYSICAL EXAM:  Physical Exam  Vitals and nursing note reviewed.   Constitutional:       General: He is not in acute distress.     Appearance: Normal appearance.   HENT:      Head: Normocephalic and atraumatic.      Right Ear: Tympanic membrane, ear canal and external ear normal.      Left Ear: Tympanic membrane, ear canal and external ear normal.      Nose: Nose normal. No congestion or rhinorrhea.      Mouth/Throat:      Mouth: Mucous membranes are moist.      Pharynx: Oropharynx is clear. No oropharyngeal exudate or posterior oropharyngeal erythema.   Eyes:      Extraocular Movements: Extraocular movements intact.      Conjunctiva/sclera: Conjunctivae normal.      Pupils: Pupils are equal, round, and reactive to light.   Cardiovascular:      Rate and Rhythm: Normal rate and regular rhythm.   Pulmonary:      Effort: Pulmonary effort is normal.      Breath sounds: No wheezing, rhonchi or rales.   Musculoskeletal:         General: Normal range of motion.      Cervical back: Normal range of motion.   Lymphadenopathy:      Cervical: No cervical adenopathy.   Skin:     " General: Skin is warm and dry.      Findings: Rash present.             Comments: Has a line of erythema, approximately 3 in wide across beltline.  Nontender.  No breaks in skin.  No lesions.  Erythema is quite mild.   Neurological:      General: No focal deficit present.      Mental Status: He is alert.            LABS / IMAGING:  Recent Results (from the past 4368 hour(s))   CULTURE, TISSUE    Collection Time: 11/01/22  9:19 AM    Specimen: Leg, Left; Tissue   Result Value Ref Range    Aerobic Culture - Tissue (A)      METHICILLIN RESISTANT STAPHYLOCOCCUS AUREUS  Many      Gram Stain Result Rare WBC's     Gram Stain Result Few Gram positive cocci        Susceptibility    Methicillin resistant Staphylococcus aureus - CULTURE, TISSUE     Clindamycin <=0.5 Sensitive mcg/mL     Erythromycin 2 Intermediate mcg/mL     Oxacillin >2 Resistant mcg/mL     Penicillin >8 Resistant mcg/mL     Trimeth/Sulfa <=0.5/9.5 Sensitive mcg/mL     Tetracycline <=4 Sensitive mcg/mL     Vancomycin 1 Sensitive mcg/mL   POCT COVID-19 Rapid Screening    Collection Time: 12/06/22  9:20 AM   Result Value Ref Range    POC Rapid COVID Negative Negative     Acceptable Yes          Assessment    1. Fatigue, unspecified type    2. Itching    3. Sleep difficulties    4. SOB (shortness of breath) on exertion    5. DDD (degenerative disc disease), lumbosacral          Plan    Claude was seen today for follow-up.    Diagnoses and all orders for this visit:    Fatigue, unspecified type  -     CBC Auto Differential; Future  -     Comprehensive Metabolic Panel; Future  -     TSH; Future  -     B-TYPE NATRIURETIC PEPTIDE; Future    Itching  -     CBC Auto Differential; Future  -     Comprehensive Metabolic Panel; Future  -     TSH; Future  -     B-TYPE NATRIURETIC PEPTIDE; Future  -     triamcinolone acetonide 0.1% (KENALOG) 0.1 % cream; Apply topically 2 (two) times daily as needed (itching).    Sleep difficulties  -     CBC Auto  Differential; Future  -     Comprehensive Metabolic Panel; Future  -     TSH; Future  -     B-TYPE NATRIURETIC PEPTIDE; Future    SOB (shortness of breath) on exertion  -     CBC Auto Differential; Future  -     Comprehensive Metabolic Panel; Future  -     TSH; Future  -     B-TYPE NATRIURETIC PEPTIDE; Future    DDD (degenerative disc disease), lumbosacral      Physically, he looks good today.      Will get some screening labs, as above.      Wonder if the gabapentin could be causing his tiredness, sleepiness?  Recommended he try weaning off of it.  Start with the middle dose.  Try to 100 mg for a week or so.  Then, decrease to 100 mg.  Then decrease to 0.  If he does not have any increase in pains, especially back pain, then he can start decreasing the morning dose.  If it anytime pains return, increase, he can go back on it.      Offered referral to Dr. MARCIAL to look at the back.  He declines at this time.  Will let us know.      For the itching, will discontinue hydroxyzine since it does not seem to be working.  Maybe contributing to his sleepiness.  Instead, will send some triamcinolone cream that he can use topically.  If no improvement with this, would have him see Dermatology.    FOLLOW-UP:  Follow up if symptoms worsen or fail to improve.    I spent a total of 45 minutes face to face and non-face to face on the date of this visit.This includes time preparing to see the patient (eg, review of tests, notes), obtaining and/or reviewing additional history from an independent historian and/or outside medical records, documenting clinical information in the electronic health record, independently interpreting results and/or communicating results to the patient/family/caregiver, or care coordinator.    Signed by:  Hima Cannon MD

## 2023-03-30 NOTE — PATIENT INSTRUCTIONS
Overall, you look pretty good.      Let us also get some screening blood work on you today.  Hopefully we can find a source of the fatigue, tiredness.      For the itching, stop taking the hydroxyzine.  Does not seem to be helping anyway.  You can continue taking the Xyzal.  However, if you feel like it is making you sleepy, you can try stopping it to.      Instead I am sending a prescription for triamcinolone cream to the pharmacy.  Use a small amount, twice daily, as needed for itching.  Rub the cream in for a good 10-15 seconds.  This should help stop it.    I wonder if the gabapentin is making excessively tired during the day.  Let us start weaning it off.      For the next three or four days, instead of taking three tablets/capsules at mid day, only take two.  If you do okay with this, then decrease to one, then stop altogether.  If your back pain does not change, nor worsen, then start decreasing the morning dose.  Go slowly with it.      If the back or legs do started hurting, you can restart the medicine, or let me know and we can always get you in with our back specialist, Dr. Rosa.  He comes here every Tuesday.    Continue to eat a healthy diet.  Be careful with portion sizes.  Includes lots of fresh fruits, vegetables, whole grains, lean proteins.  See info below.    Keep hydrated.  Be sure to drink at least 8-10, 8 oz, glasses of water every day.    Stay active.  Try to do some sort of physical activity every day.  Nothing outrageous, just try walking for 10-15 minutes each day.

## 2023-03-31 LAB
ALBUMIN SERPL BCP-MCNC: 3.7 G/DL (ref 3.5–5.2)
ALP SERPL-CCNC: 83 U/L (ref 55–135)
ALT SERPL W/O P-5'-P-CCNC: 9 U/L (ref 10–44)
ANION GAP SERPL CALC-SCNC: 6 MMOL/L (ref 8–16)
AST SERPL-CCNC: 20 U/L (ref 10–40)
BILIRUB SERPL-MCNC: 1.4 MG/DL (ref 0.1–1)
BNP SERPL-MCNC: 114 PG/ML (ref 0–99)
BUN SERPL-MCNC: 9 MG/DL (ref 10–30)
CALCIUM SERPL-MCNC: 8.9 MG/DL (ref 8.7–10.5)
CHLORIDE SERPL-SCNC: 107 MMOL/L (ref 95–110)
CO2 SERPL-SCNC: 25 MMOL/L (ref 23–29)
CREAT SERPL-MCNC: 0.8 MG/DL (ref 0.5–1.4)
EST. GFR  (NO RACE VARIABLE): >60 ML/MIN/1.73 M^2
GLUCOSE SERPL-MCNC: 94 MG/DL (ref 70–110)
POTASSIUM SERPL-SCNC: 5.1 MMOL/L (ref 3.5–5.1)
PROT SERPL-MCNC: 6.6 G/DL (ref 6–8.4)
SODIUM SERPL-SCNC: 138 MMOL/L (ref 136–145)
TSH SERPL DL<=0.005 MIU/L-ACNC: 0.89 UIU/ML (ref 0.4–4)

## 2023-05-02 NOTE — PROGRESS NOTES
Mr. Barron,     You should be able to see the results of your recent blood work in [facility].  Overall, everything looks okay.      Blood counts look nice and stable.  No signs of blood loss.  Electrolytes, kidney function, liver function, and thyroid function also look okay.  BNP was fine, so no signs of heart failure.    Please let us know if you have any questions!

## 2023-05-22 DIAGNOSIS — I48.20 ATRIAL FIBRILLATION, CHRONIC: ICD-10-CM

## 2023-05-22 RX ORDER — WARFARIN SODIUM 5 MG/1
TABLET ORAL
Qty: 90 TABLET | Refills: 1 | Status: SHIPPED | OUTPATIENT
Start: 2023-05-22 | End: 2024-01-15 | Stop reason: SDUPTHER

## 2023-05-22 NOTE — TELEPHONE ENCOUNTER
Refill Routing Note   Medication(s) are not appropriate for processing by [facility] for the following reason(s):      Medication outside of protocol : COUMADIN    [facility] action(s):  Route None identified            Appointments  past 12m or future 3m with PCP    Date Provider   Last Visit   3/30/2023 Hima Cannon MD   Next Visit   6/23/2023 Hima Cannon MD   ED visits in past 90 days: 0        Note composed:3:15 PM 05/22/2023

## 2023-06-23 ENCOUNTER — OFFICE VISIT (OUTPATIENT)
Dept: PRIMARY CARE CLINIC | Facility: CLINIC | Age: 88
End: 2023-06-23
Payer: MEDICARE

## 2023-06-23 VITALS
HEART RATE: 61 BPM | TEMPERATURE: 99 F | SYSTOLIC BLOOD PRESSURE: 128 MMHG | HEIGHT: 69 IN | WEIGHT: 158.31 LBS | DIASTOLIC BLOOD PRESSURE: 62 MMHG | BODY MASS INDEX: 23.45 KG/M2

## 2023-06-23 DIAGNOSIS — L29.9 ITCHING: ICD-10-CM

## 2023-06-23 PROCEDURE — 99215 OFFICE O/P EST HI 40 MIN: CPT | Mod: S$GLB,,, | Performed by: FAMILY MEDICINE

## 2023-06-23 PROCEDURE — 3288F FALL RISK ASSESSMENT DOCD: CPT | Mod: CPTII,S$GLB,, | Performed by: FAMILY MEDICINE

## 2023-06-23 PROCEDURE — 1159F MED LIST DOCD IN RCRD: CPT | Mod: CPTII,S$GLB,, | Performed by: FAMILY MEDICINE

## 2023-06-23 PROCEDURE — 1101F PR PT FALLS ASSESS DOC 0-1 FALLS W/OUT INJ PAST YR: ICD-10-PCS | Mod: CPTII,S$GLB,, | Performed by: FAMILY MEDICINE

## 2023-06-23 PROCEDURE — 1126F AMNT PAIN NOTED NONE PRSNT: CPT | Mod: CPTII,S$GLB,, | Performed by: FAMILY MEDICINE

## 2023-06-23 PROCEDURE — 99999 PR PBB SHADOW E&M-EST. PATIENT-LVL IV: CPT | Mod: PBBFAC,,, | Performed by: FAMILY MEDICINE

## 2023-06-23 PROCEDURE — 1159F PR MEDICATION LIST DOCUMENTED IN MEDICAL RECORD: ICD-10-PCS | Mod: CPTII,S$GLB,, | Performed by: FAMILY MEDICINE

## 2023-06-23 PROCEDURE — 1126F PR PAIN SEVERITY QUANTIFIED, NO PAIN PRESENT: ICD-10-PCS | Mod: CPTII,S$GLB,, | Performed by: FAMILY MEDICINE

## 2023-06-23 PROCEDURE — 3288F PR FALLS RISK ASSESSMENT DOCUMENTED: ICD-10-PCS | Mod: CPTII,S$GLB,, | Performed by: FAMILY MEDICINE

## 2023-06-23 PROCEDURE — 99215 PR OFFICE/OUTPT VISIT, EST, LEVL V, 40-54 MIN: ICD-10-PCS | Mod: S$GLB,,, | Performed by: FAMILY MEDICINE

## 2023-06-23 PROCEDURE — 1101F PT FALLS ASSESS-DOCD LE1/YR: CPT | Mod: CPTII,S$GLB,, | Performed by: FAMILY MEDICINE

## 2023-06-23 PROCEDURE — 99999 PR PBB SHADOW E&M-EST. PATIENT-LVL IV: ICD-10-PCS | Mod: PBBFAC,,, | Performed by: FAMILY MEDICINE

## 2023-06-23 RX ORDER — TRAZODONE HYDROCHLORIDE 50 MG/1
50 TABLET ORAL NIGHTLY PRN
Qty: 90 TABLET | Refills: 3 | Status: SHIPPED | OUTPATIENT
Start: 2023-06-23 | End: 2024-03-18

## 2023-06-23 RX ORDER — TRIAMCINOLONE ACETONIDE 1 MG/G
CREAM TOPICAL 2 TIMES DAILY PRN
Qty: 30 G | Refills: 2 | Status: SHIPPED | OUTPATIENT
Start: 2023-06-23 | End: 2023-11-13

## 2023-06-23 NOTE — PATIENT INSTRUCTIONS
Physically, everything looks pretty good today.      As long as the hernia is not getting worse, we can just watch it.  You may want to try switching from boxer's to boxer briefs or other underwear that can provide a bit more support to that area.  If it continues to bother you, or gets worse, please let me know.  At that point, we will have you see the surgeon to discuss repair.      For sleep, let us try using trazodone at bedtime.  This should help make you sleepy, drowsy and get you a good night's rest.      When you take the trazodone, do not take the green capsules (hydroxyzine) with it.  Instead, try using triamcinolone cream to help with itching at bedtime.  You can take green capsules (hydroxyzine) in the morning, or during the daytime, if needed to help with itching.

## 2023-06-23 NOTE — PROGRESS NOTES
"    [facility]       [address] [place] [address]      Phone: [phone]      Fax: [phone]    Hima Cannon MD                Office Visit  06/23/2023        Subjective      HPI:  Claude L Taylor is a 89 y.o. male presents today in clinic for "Follow-up  ."     89-year-old gentleman presents today for checkup, discuss multiple issues.      His wife, Ms. Quiroga, is present with him today.  She provides portion of the history.    Patient states that his toes often feels stiff, especially at night.  Sometimes he can move his foot from under the covers to make her feel better.  Mostly occurs on the right foot.      States both knees are weak.  They pop sometimes.  Right knee is a little weaker than the left.      Was having some pains in his left hand.  Was referred to Dr. Michael Maria who did a carpal tunnel procedure.  He stated that recovery may take a long time.  Still having some discomfort.  No scheduled follow-up visits.      Has a small hernia in right groin.  Does not bother very often, but does seem to get irritated, especially when standing for extended periods, such as when taking a shower or drying off.  Sitting down is okay.    Still having some itching problems.  Located on the underside of both eyes, lower back, armpits, stomach area/bell line.  Generally only one wearing pants and the fabric rubs against the skin.  Has seen Dr. Edmond Shrestha, dermatology, in the past.  Was not able to get any relief.  At previous visit, we gave prescription for hydroxyzine.  Has been taking two tablets of this at bedtime, which helps significantly with the itching.    Still has problems sleeping.  Hydroxyzine not really helping with this.  Will sometimes does off in his chair while watching TV.  When he does, he has trouble falling back to sleep when he goes to lay down in bed.  Often, will still be awake at 2:00 a.m. or 3:00 a.m..  Because of this, gets tired " during the day and takes naps.    Since last visit, has gradually weaned himself off of gabapentin.  Does not really feel any different.  Wife states that he does not seem to be as jittery, shaky.    Still has chronic issues with dry eyes.  Going to Dr. Nelson at [facility].  Has been getting light treatments.  Next treatment is in September.  Feels like it has helping.    PMH:  CAD, AFib, PVD, BPH, OA/DDD/chronic pains.  PSH:  Pacemaker, heart ablation/heart valve repair/heart catheterization, CABG, tonsils, multiple MSK (back, foot), gallbladder  Allergies:  Oxycodone makes him very emotional  Social:  Retired.  .  T:  Denies  A:  Occasionally  D:  Denies    Exercise:  No regular exercise program.  Has rolling walker and scooter to help get around.    Cardiology:  Dr. Barrios  Ophthalmology:  Dr. Brown, Dr. Sampson  Dermatology: Dr. Morfin      The following were updated and reviewed by myself in the chart: medications, past medical history, past surgical history, family history, social history, and allergies.     Medications:  Current Outpatient Medications on File Prior to Visit   Medication Sig Dispense Refill    diclofenac sodium (VOLTAREN) 1 % Gel Apply 2 g topically daily as needed (foot pain). 100 g 2    fluorometholone 0.1% (FML) 0.1 % DrpS Place 1 drop in both eyes every morning 5 mL 0    fluorometholone 0.1% (FML) 0.1 % DrpS Place 1 drop into both eyes 2 (two) times a day. 5 mL 5    furosemide (LASIX) 20 MG tablet Take 1 tablet (20 mg total) by mouth daily as needed. 180 tablet 3    hydrOXYzine pamoate (VISTARIL) 25 MG Cap Take 2 capsules (50 mg total) by mouth nightly as needed (itching, sleep). 180 capsule 3    latanoprost 0.005 % ophthalmic solution Place 1 drop into both eyes every evening. 7.5 mL 0    latanoprost 0.005 % ophthalmic solution Instill 1 drop in each eye every night at bedtime. 7.5 mL 3    levocetirizine (XYZAL) 5 MG tablet Take 1 tablet by mouth every morning for itching  30 tablet 6    loteprednol (LOTEMAX) 0.5 % ophthalmic suspension Place 1 drop into both eyes every morning. 45 mL 1    propylene glycol/peg 400/PF (SYSTANE, PF, OPHT) Apply to eye.      tamsulosin (FLOMAX) 0.4 mg Cap Take 2 capsules (0.8 mg total) by mouth every evening. For prostate and urine flow 180 capsule 3    warfarin (COUMADIN) 5 MG tablet Take 1 tablet by mouth every day or as directed by coumadin clinic 90 tablet 1    [DISCONTINUED] triamcinolone acetonide 0.1% (KENALOG) 0.1 % cream Apply topically 2 (two) times daily as needed (itching). 30 g 2    [DISCONTINUED] gabapentin (NEURONTIN) 100 MG capsule Take 2 capsules in the morning, 2 capsules at  mid-day, and 3 capsule by mouth at bedtime for neuropathy (Patient not taking: Reported on 6/23/2023) 630 capsule 3     No current facility-administered medications on file prior to visit.        PMHx:  Past Medical History:   Diagnosis Date    *Atrial fibrillation     pacer Dr Barrios    Anticoagulant long-term use     Cancer     skin    Cervical neck pain with evidence of disc disease     Colon polyps 12/14/2015    Coronary artery disease     Depression     Diverticulosis     Hypertension     Kidney stone     Skin cancer       Patient Active Problem List    Diagnosis Date Noted    Positive SHOAIB (antinuclear antibody) 05/02/2022    Keratoconjunctivitis sicca due to decreased tear production, bilateral 05/02/2022    Primary osteoarthritis involving multiple joints 05/02/2022    Dependence on other enabling machines and devices 11/09/2021    Thrombocytopenia, unspecified 05/03/2021    Atrial fibrillation, chronic 05/03/2021    Osteoporosis 05/03/2021    Sacroiliitis 09/25/2019    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    SOB (shortness of breath) on exertion 03/27/2019    Benign prostatic hyperplasia with weak urinary stream 03/27/2019    Cardiac pacemaker 07/31/2018    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    Nonrheumatic mitral (valve) insufficiency 07/31/2018    Peripheral  vascular disease 07/31/2018    Hx of CABG 05/23/2018    Spinal stenosis of lumbar region at multiple levels 05/23/2018    DDD (degenerative disc disease), lumbosacral 05/23/2018    Proximal muscle weakness 05/23/2018    OAB (overactive bladder) 05/23/2018    Coronary artery disease involving native coronary artery of native heart without angina pectoris 10/05/2017    Permanent atrial fibrillation 10/05/2017    Anticoagulated on Coumadin 10/05/2017    B12 deficiency due to diet 10/05/2017    Primary osteoarthritis of both knees 07/14/2017    Chronic constipation 09/26/2016    Anismus 12/14/2015    Diverticulosis of large intestine without hemorrhage 12/14/2015    Altered bowel habits 12/13/2015    Chronic back pain 09/15/2015    Osteoarthritis 11/18/2014    Long term (current) use of anticoagulants 11/06/2014    Asthma in adult 01/14/2014    Coronary artery disease involving native coronary artery of native heart with angina pectoris 12/10/2013    Depression         PSHx:  Past Surgical History:   Procedure Laterality Date    back fusion      CARDIAC PACEMAKER PLACEMENT      CARDIAC VALVE REPLACEMENT      CHOLECYSTECTOMY      COLONOSCOPY  03/22/2013    COLONOSCOPY N/A 12/14/2015    Procedure: COLONOSCOPY;  Surgeon: Gonzalo Gorman MD;  Location: [facility];  Service: Endoscopy;  Laterality: N/A;    CORONARY ARTERY BYPASS GRAFT      ELBOW BURSA SURGERY      left    EYE SURGERY      FOOT SURGERY      MITRAL VALVE REPLACEMENT      REPLACEMENT OF PACEMAKER  09/2019    SHOULDER SURGERY      SKIN CANCER EXCISION  01/2018/   04/2018 Dr.Thorla Jones    lower left leg     SPINE SURGERY  3 surgeries    TONSILLECTOMY          FHx:  Family History   Problem Relation Age of Onset    Cancer Father     Arthritis Father     Colon cancer Neg Hx         Social:  Social History     Socioeconomic History    Marital status:      Spouse name: marguerite Barron   Tobacco Use    Smoking status: Never    Smokeless tobacco: Never    Tobacco  "comments:     never a smoker   Substance and Sexual Activity    Alcohol use: Not Currently     Alcohol/week: 2.0 standard drinks     Types: 1 Glasses of wine, 1 Cans of beer per week     Comment: drinks above only with meals ocassionaly    Drug use: Never    Sexual activity: Not Currently     Partners: Female     Comment: too old at 89        Allergies:  Review of patient's allergies indicates:   Allergen Reactions    Oxycodone      "it's mental/emotional        ROS:  Review of Systems   Constitutional:  Negative for activity change, appetite change, chills and fever.   HENT:  Negative for congestion, postnasal drip, rhinorrhea, sore throat and trouble swallowing.    Respiratory:  Negative for cough and shortness of breath.    Cardiovascular:  Negative for chest pain and palpitations.   Gastrointestinal:  Negative for abdominal pain, constipation, diarrhea, nausea and vomiting.   Genitourinary:  Negative for difficulty urinating.   Musculoskeletal:  Positive for arthralgias. Negative for myalgias.   Skin:  Negative for color change and rash.   Neurological:  Positive for weakness. Negative for headaches.   All other systems reviewed and are negative.       Objective      /62   Pulse 61   Temp 99.3 °F (37.4 °C)   Ht 5' 9" (1.753 m)   Wt 71.8 kg (158 lb 4.6 oz)   BMI 23.38 kg/m²   Ht Readings from Last 3 Encounters:   06/23/23 5' 9" (1.753 m)   03/30/23 5' 9" (1.753 m)   02/23/23 6' (1.829 m)     Wt Readings from Last 3 Encounters:   06/23/23 71.8 kg (158 lb 4.6 oz)   03/30/23 76.6 kg (168 lb 14 oz)   02/23/23 76 kg (167 lb 8.8 oz)       PHYSICAL EXAM:  Physical Exam  Vitals and nursing note reviewed.   Constitutional:       General: He is not in acute distress.     Appearance: Normal appearance.   HENT:      Head: Normocephalic and atraumatic.      Right Ear: Tympanic membrane, ear canal and external ear normal.      Left Ear: Tympanic membrane, ear canal and external ear normal.      Nose: Nose normal. No " congestion or rhinorrhea.      Mouth/Throat:      Mouth: Mucous membranes are moist.      Pharynx: Oropharynx is clear. No oropharyngeal exudate or posterior oropharyngeal erythema.   Eyes:      Extraocular Movements: Extraocular movements intact.      Conjunctiva/sclera: Conjunctivae normal.      Pupils: Pupils are equal, round, and reactive to light.   Cardiovascular:      Rate and Rhythm: Normal rate and regular rhythm.   Pulmonary:      Effort: Pulmonary effort is normal.      Breath sounds: No wheezing, rhonchi or rales.   Musculoskeletal:         General: Normal range of motion.      Cervical back: Normal range of motion.   Lymphadenopathy:      Cervical: No cervical adenopathy.   Skin:     General: Skin is warm and dry.   Neurological:      General: No focal deficit present.      Mental Status: He is alert.            LABS / IMAGING:  Recent Results (from the past 4368 hour(s))   CBC Auto Differential    Collection Time: 03/30/23 11:36 AM   Result Value Ref Range    WBC 6.50 3.90 - 12.70 K/uL    RBC 4.21 (L) 4.60 - 6.20 M/uL    Hemoglobin 12.8 (L) 14.0 - 18.0 g/dL    Hematocrit 39.2 (L) 40.0 - 54.0 %    MCV 93 82 - 98 fL    MCH 30.4 27.0 - 31.0 pg    MCHC 32.7 32.0 - 36.0 g/dL    RDW 14.2 11.5 - 14.5 %    Platelets 165 150 - 450 K/uL    MPV 11.3 9.2 - 12.9 fL    Immature Granulocytes 0.2 0.0 - 0.5 %    Gran # (ANC) 3.9 1.8 - 7.7 K/uL    Immature Grans (Abs) 0.01 0.00 - 0.04 K/uL    Lymph # 1.5 1.0 - 4.8 K/uL    Mono # 0.6 0.3 - 1.0 K/uL    Eos # 0.4 0.0 - 0.5 K/uL    Baso # 0.11 0.00 - 0.20 K/uL    nRBC 0 0 /100 WBC    Gran % 59.6 38.0 - 73.0 %    Lymph % 22.3 18.0 - 48.0 %    Mono % 9.4 4.0 - 15.0 %    Eosinophil % 6.8 0.0 - 8.0 %    Basophil % 1.7 0.0 - 1.9 %    Differential Method Automated    Comprehensive Metabolic Panel    Collection Time: 03/30/23 11:36 AM   Result Value Ref Range    Sodium 138 136 - 145 mmol/L    Potassium 5.1 3.5 - 5.1 mmol/L    Chloride 107 95 - 110 mmol/L    CO2 25 23 - 29 mmol/L     Glucose 94 70 - 110 mg/dL    BUN 9 (L) 10 - 30 mg/dL    Creatinine 0.8 0.5 - 1.4 mg/dL    Calcium 8.9 8.7 - 10.5 mg/dL    Total Protein 6.6 6.0 - 8.4 g/dL    Albumin 3.7 3.5 - 5.2 g/dL    Total Bilirubin 1.4 (H) 0.1 - 1.0 mg/dL    Alkaline Phosphatase 83 55 - 135 U/L    AST 20 10 - 40 U/L    ALT 9 (L) 10 - 44 U/L    Anion Gap 6 (L) 8 - 16 mmol/L    eGFR >60.0 >60 mL/min/1.73 m^2   TSH    Collection Time: 03/30/23 11:36 AM   Result Value Ref Range    TSH 0.888 0.400 - 4.000 uIU/mL   B-TYPE NATRIURETIC PEPTIDE    Collection Time: 03/30/23 11:36 AM   Result Value Ref Range     (H) 0 - 99 pg/mL         Assessment    1. Itching          Plan    Claude was seen today for follow-up.    Diagnoses and all orders for this visit:    Itching  -     triamcinolone acetonide 0.1% (KENALOG) 0.1 % cream; Apply topically 2 (two) times daily as needed (itching).    Other orders  -     traZODone (DESYREL) 50 MG tablet; Take 1 tablet (50 mg total) by mouth nightly as needed for Insomnia.      Physically, everything looks pretty good today.      Discussed the hernia.  Not really bothering him at this point, just likes to make mention of it.  Recommended instead of wearing boxers, he try boxer briefs for a bit more support.  Dot at the point where he is ready to consider surgery.  Not sure if he is a good candidate for surgery anyway.      For the itching, will have him try triamcinolone cream, as needed.  Okay to use one capsule of hydroxyzine during the daytime, if needed.    For sleep, will have him try trazodone at bedtime.  Will avoid the hydroxyzine at bedtime.    Will stay off the gabapentin since he does not see any difference without it.    FOLLOW-UP:  Follow up in about 4 months (around 10/23/2023) for check up.    I spent a total of 45 minutes face to face and non-face to face on the date of this visit.This includes time preparing to see the patient (eg, review of tests, notes), obtaining and/or reviewing additional  history from an independent historian and/or outside medical records, documenting clinical information in the electronic health record, independently interpreting results and/or communicating results to the patient/family/caregiver, or care coordinator.    Signed by:  Hima Cannon MD

## 2023-07-10 ENCOUNTER — TELEPHONE (OUTPATIENT)
Dept: PRIMARY CARE CLINIC | Facility: CLINIC | Age: 88
End: 2023-07-10

## 2023-07-10 NOTE — TELEPHONE ENCOUNTER
----- Message from Eva Mccrary sent at 7/10/2023  9:11 AM CDT -----  Contact: self [phone]  Patient called in this morning requesting a call back concerning a medication that he puts on his knees that works very well for him and he can't remember the name of it. Please call back [phone]. Thanks robert

## 2023-08-23 ENCOUNTER — TELEPHONE (OUTPATIENT)
Dept: ADMINISTRATIVE | Facility: HOSPITAL | Age: 88
End: 2023-08-23

## 2023-09-28 ENCOUNTER — TELEPHONE (OUTPATIENT)
Dept: PRIMARY CARE CLINIC | Facility: CLINIC | Age: 88
End: 2023-09-28

## 2023-09-28 NOTE — TELEPHONE ENCOUNTER
Spoke with pts wife and she advised me that that fell yesterday and hit his head on the concrete. Pt is complaining of neck pain and not being able to move neck. I advised pts wife and pt to go to the ER. Pt and pts wife verbalized understanding

## 2023-09-28 NOTE — TELEPHONE ENCOUNTER
----- Message from Facundo Garcia sent at 9/28/2023  1:00 PM CDT -----  Contact: wife  ..Type:  Sooner Apoointment Request    Caller is requesting a sooner appointment.  Caller declined first available appointment listed below.  Caller will not accept being placed on the waitlist and is requesting a message be sent to doctor.  Name of Caller:Kimber   When is the first available appointment?10/28  Symptoms:neck pain form fall   Would the patient rather a call back or a response via [facility]? Call back   Best Call Back Number:.[phone] (home)   Additional Information:

## 2023-11-13 ENCOUNTER — HOSPITAL ENCOUNTER (OUTPATIENT)
Dept: RADIOLOGY | Facility: HOSPITAL | Age: 88
Discharge: HOME | End: 2023-11-13
Attending: FAMILY MEDICINE
Payer: MEDICARE

## 2023-11-13 ENCOUNTER — OFFICE VISIT (OUTPATIENT)
Dept: PRIMARY CARE CLINIC | Facility: CLINIC | Age: 88
End: 2023-11-13
Payer: MEDICARE

## 2023-11-13 VITALS
WEIGHT: 156.5 LBS | HEIGHT: 69 IN | BODY MASS INDEX: 23.18 KG/M2 | TEMPERATURE: 97 F | SYSTOLIC BLOOD PRESSURE: 132 MMHG | OXYGEN SATURATION: 99 % | DIASTOLIC BLOOD PRESSURE: 68 MMHG | HEART RATE: 61 BPM

## 2023-11-13 DIAGNOSIS — M79.641 RIGHT HAND PAIN: ICD-10-CM

## 2023-11-13 DIAGNOSIS — R53.83 FATIGUE, UNSPECIFIED TYPE: ICD-10-CM

## 2023-11-13 DIAGNOSIS — Z23 NEED FOR VACCINATION: ICD-10-CM

## 2023-11-13 DIAGNOSIS — M15.9 PRIMARY OSTEOARTHRITIS INVOLVING MULTIPLE JOINTS: ICD-10-CM

## 2023-11-13 DIAGNOSIS — L29.9 ITCHING: ICD-10-CM

## 2023-11-13 DIAGNOSIS — M79.641 RIGHT HAND PAIN: Primary | ICD-10-CM

## 2023-11-13 PROBLEM — M46.1 SACROILIITIS: Status: RESOLVED | Noted: 2019-09-25 | Resolved: 2023-11-13

## 2023-11-13 PROBLEM — D69.6 THROMBOCYTOPENIA, UNSPECIFIED: Status: RESOLVED | Noted: 2021-05-03 | Resolved: 2023-11-13

## 2023-11-13 PROCEDURE — 1100F PR PT FALLS ASSESS DOC 2+ FALLS/FALL W/INJURY/YR: ICD-10-PCS | Mod: CPTII,S$GLB,, | Performed by: FAMILY MEDICINE

## 2023-11-13 PROCEDURE — 1126F AMNT PAIN NOTED NONE PRSNT: CPT | Mod: CPTII,S$GLB,, | Performed by: FAMILY MEDICINE

## 2023-11-13 PROCEDURE — 90694 FLU VACCINE - QUADRIVALENT - ADJUVANTED: ICD-10-PCS | Mod: S$GLB,,, | Performed by: FAMILY MEDICINE

## 2023-11-13 PROCEDURE — 90694 VACC AIIV4 NO PRSRV 0.5ML IM: CPT | Mod: S$GLB,,, | Performed by: FAMILY MEDICINE

## 2023-11-13 PROCEDURE — 99999 PR PBB SHADOW E&M-EST. PATIENT-LVL V: ICD-10-PCS | Mod: PBBFAC,,, | Performed by: FAMILY MEDICINE

## 2023-11-13 PROCEDURE — G0008 ADMIN INFLUENZA VIRUS VAC: HCPCS | Mod: S$GLB,,, | Performed by: FAMILY MEDICINE

## 2023-11-13 PROCEDURE — 99999 PR PBB SHADOW E&M-EST. PATIENT-LVL V: CPT | Mod: PBBFAC,,, | Performed by: FAMILY MEDICINE

## 2023-11-13 PROCEDURE — 73130 XR HAND COMPLETE 3 VIEW RIGHT: ICD-10-PCS | Mod: 26,RT,, | Performed by: RADIOLOGY

## 2023-11-13 PROCEDURE — 73130 X-RAY EXAM OF HAND: CPT | Mod: TC,PN,RT

## 2023-11-13 PROCEDURE — 3288F PR FALLS RISK ASSESSMENT DOCUMENTED: ICD-10-PCS | Mod: CPTII,S$GLB,, | Performed by: FAMILY MEDICINE

## 2023-11-13 PROCEDURE — G0008 FLU VACCINE - QUADRIVALENT - ADJUVANTED: ICD-10-PCS | Mod: S$GLB,,, | Performed by: FAMILY MEDICINE

## 2023-11-13 PROCEDURE — 99215 OFFICE O/P EST HI 40 MIN: CPT | Mod: 25,S$GLB,, | Performed by: FAMILY MEDICINE

## 2023-11-13 PROCEDURE — 1126F PR PAIN SEVERITY QUANTIFIED, NO PAIN PRESENT: ICD-10-PCS | Mod: CPTII,S$GLB,, | Performed by: FAMILY MEDICINE

## 2023-11-13 PROCEDURE — 1159F PR MEDICATION LIST DOCUMENTED IN MEDICAL RECORD: ICD-10-PCS | Mod: CPTII,S$GLB,, | Performed by: FAMILY MEDICINE

## 2023-11-13 PROCEDURE — 3288F FALL RISK ASSESSMENT DOCD: CPT | Mod: CPTII,S$GLB,, | Performed by: FAMILY MEDICINE

## 2023-11-13 PROCEDURE — 1100F PTFALLS ASSESS-DOCD GE2>/YR: CPT | Mod: CPTII,S$GLB,, | Performed by: FAMILY MEDICINE

## 2023-11-13 PROCEDURE — 1159F MED LIST DOCD IN RCRD: CPT | Mod: CPTII,S$GLB,, | Performed by: FAMILY MEDICINE

## 2023-11-13 PROCEDURE — 73130 X-RAY EXAM OF HAND: CPT | Mod: 26,RT,, | Performed by: RADIOLOGY

## 2023-11-13 PROCEDURE — 99215 PR OFFICE/OUTPT VISIT, EST, LEVL V, 40-54 MIN: ICD-10-PCS | Mod: 25,S$GLB,, | Performed by: FAMILY MEDICINE

## 2023-11-13 NOTE — PROGRESS NOTES
Overall, hand x-ray has lots of arthritis, but no signs of any objects in that spot.    When you come see Dr. Lehman, she will go over the x-ray with you in more detail and should be able to get you some long-term relief.

## 2023-11-13 NOTE — PATIENT INSTRUCTIONS
Overall, everything looks pretty good today.      Let us get an x-ray of the hand so we can see inside.  I suspect you have either a calcium deposit or a cyst on the tendon for the little finger.  Would like you to come back on Thursday to see our hand specialist, Dr. Lehman.  She will review the x-ray, examine the hand, and offer suggestions on how to get you long-term relief.    If the urinary issues persist, we can always get you in with our urologist, as well.  We do have a urologist who comes here on Thursdays, so we could probably have you see the hand specialist the same day.  Keep me posted if you would like to schedule an appointment.      For the aches, pains, continue using Tylenol (two, 500 mg tablets, every 8 hours) as needed.  You can also continue the heating pad.    Your weight looks fairly stable over the last six months.  Continue to eat a healthy diet.  Be careful with portion sizes.  Includes lots of fresh fruits, vegetables, whole grains, lean proteins.      Keep hydrated.  Be sure to drink at least 8-10, 8 oz, glasses of water every day.    Stay active.  Try to do some sort of physical activity every day.  Nothing outrageous, just try walking for 5-10 minutes each day.

## 2023-11-13 NOTE — PROGRESS NOTES
"    [facility]       [address] [place] [address]      Phone: [phone]      Fax: [phone]    Hima Cannon MD                Office Visit  11/13/2023        Subjective      HPI:  Claude L Taylor is a 89 y.o. male presents today in clinic for "Follow-up  ."     89-year-old gentleman presents today for checkup, discuss multiple issues.      His wife, Ms. Quiroga, is present with him today.  She provides portion of the history.    Patient states that his toes still feel stiff, especially at night.  Sometimes he can move his foot from under the covers to make them feel better.  Mostly occurs on the right foot.      States both knees are weak.  They pop sometimes.  Right knee is a little weaker than the left.  Has done physical therapy in the past, does not think it helped.  Not interested in doing it again.    Has a small hernia in right groin.  Does not bother very often, but does seem to get irritated, especially when standing for extended periods, such as when taking a shower or drying off.  Sitting down is okay.    Still having some itching problems.  Located on the lower back, armpits, stomach area/belt line.  Generally only when wearing pants and the fabric rubs against the skin.  Has seen Dr. Morfin at [facility], in the past.  Was not able to get any relief.  Has tried hydroxyzine, multiple creams.  Fluocinonide ointment seems to help a bit.  States that he applies moisturizing lotion twice a day.    Still has chronic issues with dry eyes.  Going to Dr. Nelson at [facility].  Has been getting light treatments.  Next treatment is in September.  Feels like it has helping.    Feels like old age is showing.  Strength decreased.  Has trouble getting up when sitting on the floor.  As above, has done physical therapy in the past, but not interested in doing it again.    Takes Flomax, two capsules, each evening.  During the day, gets urges " to urinate frequently, but very little output.  Gets up once at night to urinate, and is able to get more out.  Interestingly, whenever he uses the microwave, it causes a desire to urinate.    Palm of right hand feels irritated.  Feels like he has something in it.  Uses that hand to operate his cane.  Pressure just below the little finger tends to hurt.    PMH:  CAD, AFib, PVD, BPH, OA/DDD/chronic pains.  PSH:  Pacemaker, heart ablation/heart valve repair/heart catheterization, CABG, tonsils, multiple MSK (back, foot), gallbladder  Allergies:  Oxycodone makes him very emotional  Social:  Retired.  .  T:  Denies  A:  Occasionally  D:  Denies    Exercise:  No regular exercise program.  Has rolling walker and scooter to help get around.    Cardiology:  Dr. Barrios  Ophthalmology:  Dr. Brown, Dr. Sampson  Dermatology: Dr. Morfin    Follow-up  Associated symptoms include arthralgias and weakness. Pertinent negatives include no abdominal pain, chest pain, chills, congestion, coughing, fever, headaches, nausea, rash, sore throat or vomiting.       The following were updated and reviewed by myself in the chart: medications, past medical history, past surgical history, family history, social history, and allergies.     Medications:  Current Outpatient Medications on File Prior to Visit   Medication Sig Dispense Refill    fluocinonide (LIDEX) 0.05 % external solution Apply twice daily to scalp as needed for flares. Do NOT use on face 60 mL 6    fluocinonide 0.05% (LIDEX) 0.05 % cream Apply to rash on legs twice daily as needed for flares. Do not use on face or sensitive areas. 60 g 6    fluorometholone 0.1% (FML) 0.1 % DrpS Place 1 drop into both eyes 2 (two) times a day. 5 mL 5    fluorometholone 0.1% (FML) 0.1 % DrpS Place 1 drop in both eyes twice daily 5 mL 5    fluorometholone 0.1% (FML) 0.1 % DrpS Place 1 drop in both eyes twice daily 5 mL 5    furosemide (LASIX) 20 MG tablet Take 1 tablet by mouth as needed. 180  tablet 3    hydrOXYzine pamoate (VISTARIL) 25 MG Cap Take 2 capsules (50 mg total) by mouth nightly as needed (itching, sleep). 180 capsule 3    latanoprost 0.005 % ophthalmic solution Place 1 drop into both eyes every evening. 7.5 mL 0    latanoprost 0.005 % ophthalmic solution Instill 1 drop in each eye every night at bedtime. 7.5 mL 3    latanoprost 0.005 % ophthalmic solution Instill 1 drop into each eye once daily at bedtime 7.5 mL 0    loteprednol (LOTEMAX) 0.5 % ophthalmic suspension Place 1 drop into both eyes every morning. 45 mL 1    tamsulosin (FLOMAX) 0.4 mg Cap Take 2 capsules (0.8 mg total) by mouth every evening. For prostate and urine flow 180 capsule 3    traZODone (DESYREL) 50 MG tablet Take 1 tablet (50 mg total) by mouth nightly as needed for Insomnia. 90 tablet 3    warfarin (COUMADIN) 5 MG tablet Take 1 tablet by mouth every day or as directed by coumadin clinic 90 tablet 1    [DISCONTINUED] fluorometholone 0.1% (FML) 0.1 % DrpS Place 1 drop in both eyes every morning 5 mL 0    [DISCONTINUED] diclofenac sodium (VOLTAREN) 1 % Gel Apply 2 g topically daily as needed (foot pain). 100 g 2    [DISCONTINUED] furosemide (LASIX) 20 MG tablet Take 1 tablet (20 mg total) by mouth daily as needed. 180 tablet 3    [DISCONTINUED] levocetirizine (XYZAL) 5 MG tablet Take 1 tablet by mouth every morning for itching (Patient not taking: Reported on 11/13/2023) 30 tablet 6    [DISCONTINUED] propylene glycol/peg 400/PF (SYSTANE, PF, OPHT) Apply to eye.      [DISCONTINUED] triamcinolone acetonide 0.1% (KENALOG) 0.1 % cream Apply topically 2 (two) times daily as needed (itching). (Patient not taking: Reported on 11/13/2023) 30 g 2     No current facility-administered medications on file prior to visit.        PMHx:  Past Medical History:   Diagnosis Date    *Atrial fibrillation     pacer Dr Barrios    Anticoagulant long-term use     Cancer     skin    Cervical neck pain with evidence of disc disease     Colon polyps  12/14/2015    Coronary artery disease     Depression     Diverticulosis     Hypertension     Kidney stone     Skin cancer       Patient Active Problem List    Diagnosis Date Noted    Positive SHOAIB (antinuclear antibody) 05/02/2022    Keratoconjunctivitis sicca due to decreased tear production, bilateral 05/02/2022    Primary osteoarthritis involving multiple joints 05/02/2022    Dependence on other enabling machines and devices 11/09/2021    Atrial fibrillation, chronic 05/03/2021    Osteoporosis 05/03/2021    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    SOB (shortness of breath) on exertion 03/27/2019    Benign prostatic hyperplasia with weak urinary stream 03/27/2019    Cardiac pacemaker 07/31/2018    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    Nonrheumatic mitral (valve) insufficiency 07/31/2018    Peripheral vascular disease 07/31/2018    Hx of CABG 05/23/2018    Spinal stenosis of lumbar region at multiple levels 05/23/2018    DDD (degenerative disc disease), lumbosacral 05/23/2018    Proximal muscle weakness 05/23/2018    OAB (overactive bladder) 05/23/2018    Coronary artery disease involving native coronary artery of native heart without angina pectoris 10/05/2017    Permanent atrial fibrillation 10/05/2017    Anticoagulated on Coumadin 10/05/2017    B12 deficiency due to diet 10/05/2017    Primary osteoarthritis of both knees 07/14/2017    Chronic constipation 09/26/2016    Anismus 12/14/2015    Diverticulosis of large intestine without hemorrhage 12/14/2015    Altered bowel habits 12/13/2015    Chronic back pain 09/15/2015    Osteoarthritis 11/18/2014    Long term (current) use of anticoagulants 11/06/2014    Asthma in adult 01/14/2014    Depression         PSHx:  Past Surgical History:   Procedure Laterality Date    back fusion      CARDIAC PACEMAKER PLACEMENT      CARDIAC VALVE REPLACEMENT      CHOLECYSTECTOMY      COLONOSCOPY  03/22/2013    COLONOSCOPY N/A 12/14/2015    Procedure: COLONOSCOPY;  Surgeon: Gonzalo RICE  "MD Sherif;  Location: [facility];  Service: Endoscopy;  Laterality: N/A;    CORONARY ARTERY BYPASS GRAFT      ELBOW BURSA SURGERY      left    EYE SURGERY      FOOT SURGERY      MITRAL VALVE REPLACEMENT      REPLACEMENT OF PACEMAKER  09/2019    SHOULDER SURGERY      SKIN CANCER EXCISION  01/2018/   04/2018 Dr.Thorla Jones    lower left leg     SPINE SURGERY  3 surgeries    TONSILLECTOMY          FHx:  Family History   Problem Relation Age of Onset    Cancer Father     Arthritis Father     Colon cancer Neg Hx         Social:  Social History     Socioeconomic History    Marital status:      Spouse name: marguerite Barron   Tobacco Use    Smoking status: Never    Smokeless tobacco: Never    Tobacco comments:     never a smoker   Substance and Sexual Activity    Alcohol use: Not Currently     Alcohol/week: 2.0 standard drinks of alcohol     Types: 1 Glasses of wine, 1 Cans of beer per week     Comment: drinks above only with meals ocassionaly    Drug use: Never    Sexual activity: Not Currently     Partners: Female     Comment: too old at 89        Allergies:  Review of patient's allergies indicates:   Allergen Reactions    Oxycodone      "it's mental/emotional        ROS:  Review of Systems   Constitutional:  Positive for activity change. Negative for appetite change, chills and fever.   HENT:  Negative for congestion, postnasal drip, rhinorrhea, sore throat and trouble swallowing.    Respiratory:  Negative for cough and shortness of breath.    Cardiovascular:  Negative for chest pain and palpitations.   Gastrointestinal:  Negative for abdominal pain, constipation, diarrhea, nausea and vomiting.   Genitourinary:  Negative for difficulty urinating.   Musculoskeletal:  Positive for arthralgias and back pain.   Skin:  Negative for color change and rash.   Neurological:  Positive for weakness. Negative for headaches.   All other systems reviewed and are negative.         Objective      /68   Pulse 61   Temp 96.8 " "°F (36 °C)   Ht 5' 9" (1.753 m)   Wt 71 kg (156 lb 8.4 oz)   SpO2 99%   BMI 23.11 kg/m²   Ht Readings from Last 3 Encounters:   11/13/23 5' 9" (1.753 m)   06/23/23 5' 9" (1.753 m)   03/30/23 5' 9" (1.753 m)     Wt Readings from Last 3 Encounters:   11/13/23 71 kg (156 lb 8.4 oz)   06/23/23 71.8 kg (158 lb 4.6 oz)   03/30/23 76.6 kg (168 lb 14 oz)       PHYSICAL EXAM:  Physical Exam  Vitals and nursing note reviewed.   Constitutional:       General: He is not in acute distress.     Appearance: Normal appearance.   HENT:      Head: Normocephalic and atraumatic.      Right Ear: Tympanic membrane, ear canal and external ear normal.      Left Ear: Tympanic membrane, ear canal and external ear normal.      Nose: Nose normal. No congestion or rhinorrhea.      Mouth/Throat:      Mouth: Mucous membranes are moist.      Pharynx: Oropharynx is clear. No oropharyngeal exudate or posterior oropharyngeal erythema.   Eyes:      Extraocular Movements: Extraocular movements intact.      Conjunctiva/sclera: Conjunctivae normal.      Pupils: Pupils are equal, round, and reactive to light.   Cardiovascular:      Rate and Rhythm: Normal rate and regular rhythm.   Pulmonary:      Effort: Pulmonary effort is normal.      Breath sounds: No wheezing, rhonchi or rales.   Musculoskeletal:         General: Normal range of motion.      Right hand: Tenderness present.        Hands:       Cervical back: Normal range of motion.      Comments: Right hand, in the palmar crease, proximal to 5th digit tender to palpation.  Area of tenderness seems to move with the tendon when he flexes/extends that digit.   Lymphadenopathy:      Cervical: No cervical adenopathy.   Skin:     General: Skin is warm and dry.   Neurological:      General: No focal deficit present.      Mental Status: He is alert.              LABS / IMAGING:  No results found for this or any previous visit (from the past 4368 hour(s)).      Assessment    1. Right hand pain    2. Need " for vaccination    3. Fatigue, unspecified type    4. Itching    5. Primary osteoarthritis involving multiple joints          Plan Claude was seen today for follow-up.    Diagnoses and all orders for this visit:    Right hand pain  -     X-Ray Hand 3 view Right; Future  -     Ambulatory referral/consult to Sports Medicine; Future    Need for vaccination  -     Influenza - Quadrivalent (Adjuvanted)    Fatigue, unspecified type    Itching    Primary osteoarthritis involving multiple joints      Physically, everything looks pretty good today.    Will get x-ray of the hand today to look inside.  Palpation almost feels like either a cyst or some type of calcium deposit on the tendon.  Does not seem to be a contracture.  Will have him follow up with Dr. Lehman for another look, possible injection?    Flu shot updated today.      Recommended COVID vaccine, RSV vaccine.    FOLLOW-UP:  Follow up in about 4 months (around 3/13/2024) for check up.    I spent a total of 45 minutes face to face and non-face to face on the date of this visit.This includes time preparing to see the patient (eg, review of tests, notes), obtaining and/or reviewing additional history from an independent historian and/or outside medical records, documenting clinical information in the electronic health record, independently interpreting results and/or communicating results to the patient/family/caregiver, or care coordinator.    Signed by:  Hima Cannon MD

## 2023-11-16 ENCOUNTER — OFFICE VISIT (OUTPATIENT)
Dept: SPORTS MEDICINE | Facility: CLINIC | Age: 88
End: 2023-11-16
Attending: FAMILY MEDICINE
Payer: MEDICARE

## 2023-11-16 VITALS — HEIGHT: 69 IN | BODY MASS INDEX: 23.18 KG/M2 | WEIGHT: 156.5 LBS

## 2023-11-16 DIAGNOSIS — M79.641 RIGHT HAND PAIN: ICD-10-CM

## 2023-11-16 DIAGNOSIS — M72.0 DUPUYTREN'S DISEASE OF FINGER WITH NODULES WITHOUT CONTRACTURE: Primary | ICD-10-CM

## 2023-11-16 PROCEDURE — 1160F RVW MEDS BY RX/DR IN RCRD: CPT | Mod: CPTII,S$GLB,, | Performed by: STUDENT IN AN ORGANIZED HEALTH CARE EDUCATION/TRAINING PROGRAM

## 2023-11-16 PROCEDURE — 1101F PT FALLS ASSESS-DOCD LE1/YR: CPT | Mod: CPTII,S$GLB,, | Performed by: STUDENT IN AN ORGANIZED HEALTH CARE EDUCATION/TRAINING PROGRAM

## 2023-11-16 PROCEDURE — 20612 ASPIRATE/INJ GANGLION CYST: CPT | Mod: RT,S$GLB,, | Performed by: STUDENT IN AN ORGANIZED HEALTH CARE EDUCATION/TRAINING PROGRAM

## 2023-11-16 PROCEDURE — 3288F FALL RISK ASSESSMENT DOCD: CPT | Mod: CPTII,S$GLB,, | Performed by: STUDENT IN AN ORGANIZED HEALTH CARE EDUCATION/TRAINING PROGRAM

## 2023-11-16 PROCEDURE — 1125F PR PAIN SEVERITY QUANTIFIED, PAIN PRESENT: ICD-10-PCS | Mod: CPTII,S$GLB,, | Performed by: STUDENT IN AN ORGANIZED HEALTH CARE EDUCATION/TRAINING PROGRAM

## 2023-11-16 PROCEDURE — 99203 OFFICE O/P NEW LOW 30 MIN: CPT | Mod: 25,S$GLB,, | Performed by: STUDENT IN AN ORGANIZED HEALTH CARE EDUCATION/TRAINING PROGRAM

## 2023-11-16 PROCEDURE — 3288F PR FALLS RISK ASSESSMENT DOCUMENTED: ICD-10-PCS | Mod: CPTII,S$GLB,, | Performed by: STUDENT IN AN ORGANIZED HEALTH CARE EDUCATION/TRAINING PROGRAM

## 2023-11-16 PROCEDURE — 1159F MED LIST DOCD IN RCRD: CPT | Mod: CPTII,S$GLB,, | Performed by: STUDENT IN AN ORGANIZED HEALTH CARE EDUCATION/TRAINING PROGRAM

## 2023-11-16 PROCEDURE — 99203 PR OFFICE/OUTPT VISIT, NEW, LEVL III, 30-44 MIN: ICD-10-PCS | Mod: 25,S$GLB,, | Performed by: STUDENT IN AN ORGANIZED HEALTH CARE EDUCATION/TRAINING PROGRAM

## 2023-11-16 PROCEDURE — 1160F PR REVIEW ALL MEDS BY PRESCRIBER/CLIN PHARMACIST DOCUMENTED: ICD-10-PCS | Mod: CPTII,S$GLB,, | Performed by: STUDENT IN AN ORGANIZED HEALTH CARE EDUCATION/TRAINING PROGRAM

## 2023-11-16 PROCEDURE — 1159F PR MEDICATION LIST DOCUMENTED IN MEDICAL RECORD: ICD-10-PCS | Mod: CPTII,S$GLB,, | Performed by: STUDENT IN AN ORGANIZED HEALTH CARE EDUCATION/TRAINING PROGRAM

## 2023-11-16 PROCEDURE — 20612: ICD-10-PCS | Mod: RT,S$GLB,, | Performed by: STUDENT IN AN ORGANIZED HEALTH CARE EDUCATION/TRAINING PROGRAM

## 2023-11-16 PROCEDURE — 99999 PR PBB SHADOW E&M-EST. PATIENT-LVL III: ICD-10-PCS | Mod: PBBFAC,,, | Performed by: STUDENT IN AN ORGANIZED HEALTH CARE EDUCATION/TRAINING PROGRAM

## 2023-11-16 PROCEDURE — 1101F PR PT FALLS ASSESS DOC 0-1 FALLS W/OUT INJ PAST YR: ICD-10-PCS | Mod: CPTII,S$GLB,, | Performed by: STUDENT IN AN ORGANIZED HEALTH CARE EDUCATION/TRAINING PROGRAM

## 2023-11-16 PROCEDURE — 1125F AMNT PAIN NOTED PAIN PRSNT: CPT | Mod: CPTII,S$GLB,, | Performed by: STUDENT IN AN ORGANIZED HEALTH CARE EDUCATION/TRAINING PROGRAM

## 2023-11-16 PROCEDURE — 99999 PR PBB SHADOW E&M-EST. PATIENT-LVL III: CPT | Mod: PBBFAC,,, | Performed by: STUDENT IN AN ORGANIZED HEALTH CARE EDUCATION/TRAINING PROGRAM

## 2023-11-16 RX ORDER — TRIAMCINOLONE ACETONIDE 40 MG/ML
40 INJECTION, SUSPENSION INTRA-ARTICULAR; INTRAMUSCULAR
Status: DISCONTINUED | OUTPATIENT
Start: 2023-11-16 | End: 2023-11-16 | Stop reason: HOSPADM

## 2023-11-16 RX ADMIN — TRIAMCINOLONE ACETONIDE 40 MG: 40 INJECTION, SUSPENSION INTRA-ARTICULAR; INTRAMUSCULAR at 10:11

## 2023-11-16 NOTE — PROCEDURES
Injection of Right Palm Mass, likely Dupuytrens Nodule    Date/Time: 11/16/2023 10:20 AM    Performed by: Shanta Lehman MD  Authorized by: Shanta Lehman MD    Indications:  Pain  Timeout: prior to procedure the correct patient, procedure, and site was verified    Local anesthesia used?: Yes    Anesthesia:  Local infiltration  Local anesthetic:  Lidocaine 1% without epinephrine  Anesthetic total (ml):  1    Location:  Hand  Hand sites: Right Palm Dupuytrens nodule.  Ultrasonic Guidance for Needle Placement?: No    Needle size:  25 G  Approach:  Palmar  Medications:  40 mg triamcinolone acetonide 40 mg/mL

## 2023-11-16 NOTE — PROGRESS NOTES
"Hand Surgery Clinic Note    Chief Complaint  Chief Complaint   Patient presents with    Right Hand - Pain       History of Present Illness  89-year-old right-hand dominant male who is a retired  presents for evaluation of tenderness at the ulnar aspect of his right palm with an associated bump.  No history of injury.  Symptoms have taken place for approximately 2-3 months.  Patient says that it is not painful unless he is trying to use his walker and then it can be very irritating.  He has never been seen for this before.  It he is pain with gripping.  No numbness or tingling.    Review of Systems  Review of systems negative for chest pain, shortness of breath, fevers, chills, nausea/vomiting.    Past Medical History  Past Medical History:   Diagnosis Date    *Atrial fibrillation     pacer Dr Barrios    Anticoagulant long-term use     Cancer     skin    Cervical neck pain with evidence of disc disease     Colon polyps 12/14/2015    Coronary artery disease     Depression     Diverticulosis     Hypertension     Kidney stone     Skin cancer        Past Surgical History  Past Surgical History:   Procedure Laterality Date    back fusion      CARDIAC PACEMAKER PLACEMENT      CARDIAC VALVE REPLACEMENT      CHOLECYSTECTOMY      COLONOSCOPY  03/22/2013    COLONOSCOPY N/A 12/14/2015    Procedure: COLONOSCOPY;  Surgeon: Gonzalo Gorman MD;  Location: [facility];  Service: Endoscopy;  Laterality: N/A;    CORONARY ARTERY BYPASS GRAFT      ELBOW BURSA SURGERY      left    EYE SURGERY      FOOT SURGERY      MITRAL VALVE REPLACEMENT      REPLACEMENT OF PACEMAKER  09/2019    SHOULDER SURGERY      SKIN CANCER EXCISION  01/2018/   04/2018 Dr.Thorla Jones    lower left leg     SPINE SURGERY  3 surgeries    TONSILLECTOMY         Allergies  Review of patient's allergies indicates:   Allergen Reactions    Oxycodone      "it's mental/emotional       Family History  Family History   Problem Relation Age of Onset    Cancer " Father     Arthritis Father     Colon cancer Neg Hx        Social History  Social History     Socioeconomic History    Marital status:      Spouse name: marguerite Barron   Tobacco Use    Smoking status: Never    Smokeless tobacco: Never    Tobacco comments:     never a smoker   Substance and Sexual Activity    Alcohol use: Not Currently     Alcohol/week: 2.0 standard drinks of alcohol     Types: 1 Glasses of wine, 1 Cans of beer per week     Comment: drinks above only with meals ocassionaly    Drug use: Never    Sexual activity: Not Currently     Partners: Female     Comment: too old at 89       Vital Signs  There were no vitals filed for this visit.    Physical Exam  Constitutional: Appears well-developed and well-nourished. No distress.   HENT:   Head: Normocephalic.   Eyes: EOM are normal.   Pulmonary/Chest: Effort normal.   Neurological: Oriented to person, place, and time.   Psychiatric: Normal mood and affect.     Right Upper Extremity:  There is a tender 3 x 3 mm palpable mass at the ulnar aspect of the palm in line with the small finger flexor tendon. It feels like a thickening of the skin, like a dupuytrens nodule. No palpable cord.  Mass is not mobile.  Patient is able to make a fist and extend all his fingers.  No tenderness over the A1 pulleys of all 5 fingers.  No triggering.  Patient has full flexion and extension of the small finger MCP, PIP, and DIPJ joints.  Sensation is intact in the median, radial, and ulnar nerve distributions.  Palpable radial pulse.    Imaging  Right-hand x-rays three views obtained today were independently reviewed by myself.  Patient has severe arthritis in all of the joints in his hands.  No evidence of foreign body or mass visualize in the palm visualized on x-ray.    Assessment and Plan  86-year-old male with a small bump on the ulnar aspect of his palm which is irritating with using his walker.  I discussed with him that I think this may be a Dupuytren's nodule.  We  can inject it to sit today to see if this helps with his ability to ambulate/use his walker.  I discussed that it will likely not resolve the mass but it could improve his symptoms.  Patient agreed to proceed.  See procedure note.  Activity as tolerated.  Follow up as needed    Shanta Lehman MD  Orthopaedic Hand Surgery

## 2023-12-05 ENCOUNTER — TELEPHONE (OUTPATIENT)
Dept: PRIMARY CARE CLINIC | Facility: CLINIC | Age: 88
End: 2023-12-05

## 2023-12-05 NOTE — TELEPHONE ENCOUNTER
----- Message from Dorian Bennett sent at 12/5/2023 11:55 AM CST -----  Contact: [phone]  Patient is requesting a call in regards to getting an order for a mobile scooter. Please call pt back at [phone]. Thanks KB

## 2023-12-06 ENCOUNTER — OFFICE VISIT (OUTPATIENT)
Dept: PRIMARY CARE CLINIC | Facility: CLINIC | Age: 88
End: 2023-12-06
Payer: MEDICARE

## 2023-12-06 VITALS
WEIGHT: 152.75 LBS | SYSTOLIC BLOOD PRESSURE: 116 MMHG | BODY MASS INDEX: 22.63 KG/M2 | OXYGEN SATURATION: 99 % | TEMPERATURE: 98 F | DIASTOLIC BLOOD PRESSURE: 60 MMHG | HEART RATE: 65 BPM | HEIGHT: 69 IN

## 2023-12-06 DIAGNOSIS — M15.9 PRIMARY OSTEOARTHRITIS INVOLVING MULTIPLE JOINTS: Primary | ICD-10-CM

## 2023-12-06 DIAGNOSIS — M25.50 MULTIPLE JOINT PAIN: ICD-10-CM

## 2023-12-06 DIAGNOSIS — Z99.89 DEPENDENCE ON OTHER ENABLING MACHINES AND DEVICES: ICD-10-CM

## 2023-12-06 DIAGNOSIS — Z63.6 DEPENDENT RELATIVE NEEDING CARE AT HOME: ICD-10-CM

## 2023-12-06 PROCEDURE — 1159F PR MEDICATION LIST DOCUMENTED IN MEDICAL RECORD: ICD-10-PCS | Mod: CPTII,S$GLB,, | Performed by: FAMILY MEDICINE

## 2023-12-06 PROCEDURE — 1101F PR PT FALLS ASSESS DOC 0-1 FALLS W/OUT INJ PAST YR: ICD-10-PCS | Mod: CPTII,S$GLB,, | Performed by: FAMILY MEDICINE

## 2023-12-06 PROCEDURE — 99215 OFFICE O/P EST HI 40 MIN: CPT | Mod: S$GLB,,, | Performed by: FAMILY MEDICINE

## 2023-12-06 PROCEDURE — 3288F FALL RISK ASSESSMENT DOCD: CPT | Mod: CPTII,S$GLB,, | Performed by: FAMILY MEDICINE

## 2023-12-06 PROCEDURE — 1126F AMNT PAIN NOTED NONE PRSNT: CPT | Mod: CPTII,S$GLB,, | Performed by: FAMILY MEDICINE

## 2023-12-06 PROCEDURE — 99215 PR OFFICE/OUTPT VISIT, EST, LEVL V, 40-54 MIN: ICD-10-PCS | Mod: S$GLB,,, | Performed by: FAMILY MEDICINE

## 2023-12-06 PROCEDURE — 1159F MED LIST DOCD IN RCRD: CPT | Mod: CPTII,S$GLB,, | Performed by: FAMILY MEDICINE

## 2023-12-06 PROCEDURE — 3288F PR FALLS RISK ASSESSMENT DOCUMENTED: ICD-10-PCS | Mod: CPTII,S$GLB,, | Performed by: FAMILY MEDICINE

## 2023-12-06 PROCEDURE — 1126F PR PAIN SEVERITY QUANTIFIED, NO PAIN PRESENT: ICD-10-PCS | Mod: CPTII,S$GLB,, | Performed by: FAMILY MEDICINE

## 2023-12-06 PROCEDURE — 99999 PR PBB SHADOW E&M-EST. PATIENT-LVL III: CPT | Mod: PBBFAC,,, | Performed by: FAMILY MEDICINE

## 2023-12-06 PROCEDURE — 99999 PR PBB SHADOW E&M-EST. PATIENT-LVL III: ICD-10-PCS | Mod: PBBFAC,,, | Performed by: FAMILY MEDICINE

## 2023-12-06 PROCEDURE — 1101F PT FALLS ASSESS-DOCD LE1/YR: CPT | Mod: CPTII,S$GLB,, | Performed by: FAMILY MEDICINE

## 2023-12-06 SDOH — SOCIAL DETERMINANTS OF HEALTH (SDOH): DEPENDENT RELATIVE NEEDING CARE AT HOME: Z63.6

## 2023-12-07 NOTE — PROGRESS NOTES
"    [facility]       [address] [place] [address]      Phone: [phone]      Fax: [phone]    Hima Cannon MD                Office Visit  12/06/2023        Subjective      HPI:  Claude L Taylor is a 89 y.o. male presents today in clinic for "Follow-up (Needs a motor scooter order)  ."     89-year-old gentleman presents today to discuss scooters.      States he needs a new scooter.  Current one is 8 years old.  Got repaired, rebuilt back in August, but still breaking down.  Wife was able to get a scooter from insurance and he would like to see about getting another one, himself.    Feels like old age is showing.  Strength decreased.  Has trouble getting up when sitting on the floor.  States both knees are weak.  They pop sometimes.  Right knee is a little weaker than the left.  Has done physical therapy in the past, does not think it helped.  Not interested in doing it again.  Uses a cane for ambulation.  Has a Rollator at home that he uses as well, along with his current scooter.    Still has chronic issues with dry eyes.  Going to Dr. Nelson at [facility].  Has been getting light treatments.  Has an appointment on Friday.    PMH:  CAD, AFib, PVD, BPH, OA/DDD/chronic pains.  PSH:  Pacemaker, heart ablation/heart valve repair/heart catheterization, CABG, tonsils, multiple MSK (back, foot), gallbladder  Allergies:  Oxycodone makes him very emotional  Social:  Retired.  .  T:  Denies  A:  Occasionally  D:  Denies    Exercise:  No regular exercise program.  Has rolling walker and scooter to help get around.    Cardiology:  Dr. Barrios  Ophthalmology:  Dr. Brown, Dr. Sampson  Dermatology: Dr. Morfin          The following were updated and reviewed by myself in the chart: medications, past medical history, past surgical history, family history, social history, and allergies.     Medications:  Current Outpatient Medications on File Prior to " Visit   Medication Sig Dispense Refill    fluocinonide 0.05% (LIDEX) 0.05 % cream Apply to rash on legs twice daily as needed for flares. Do not use on face or sensitive areas. 60 g 6    fluorometholone 0.1% (FML) 0.1 % DrpS Place 1 drop in both eyes twice daily 5 mL 5    furosemide (LASIX) 20 MG tablet Take 1 tablet by mouth as needed. 180 tablet 3    hydrOXYzine pamoate (VISTARIL) 25 MG Cap Take 2 capsules (50 mg total) by mouth nightly as needed (itching, sleep). 180 capsule 3    loteprednol (LOTEMAX) 0.5 % ophthalmic suspension Place 1 drop into both eyes every morning. 45 mL 1    tamsulosin (FLOMAX) 0.4 mg Cap Take 2 capsules (0.8 mg total) by mouth every evening. For prostate and urine flow 180 capsule 3    traZODone (DESYREL) 50 MG tablet Take 1 tablet (50 mg total) by mouth nightly as needed for Insomnia. 90 tablet 3    warfarin (COUMADIN) 5 MG tablet Take 1 tablet by mouth every day or as directed by coumadin clinic 90 tablet 1     No current facility-administered medications on file prior to visit.        PMHx:  Past Medical History:   Diagnosis Date    *Atrial fibrillation     pacer Dr Barrios    Anticoagulant long-term use     Cancer     skin    Cervical neck pain with evidence of disc disease     Colon polyps 12/14/2015    Coronary artery disease     Depression     Diverticulosis     Hypertension     Kidney stone     Skin cancer       Patient Active Problem List    Diagnosis Date Noted    Positive SHOAIB (antinuclear antibody) 05/02/2022    Keratoconjunctivitis sicca due to decreased tear production, bilateral 05/02/2022    Primary osteoarthritis involving multiple joints 05/02/2022    Dependence on other enabling machines and devices 11/09/2021    Atrial fibrillation, chronic 05/03/2021    Osteoporosis 05/03/2021    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    SOB (shortness of breath) on exertion 03/27/2019    Benign prostatic hyperplasia with weak urinary stream 03/27/2019    Cardiac pacemaker 07/31/2018    Hx of  prosthetic mitral valve 07/31/2018    Nonrheumatic mitral (valve) insufficiency 07/31/2018    Peripheral vascular disease 07/31/2018    Hx of CABG 05/23/2018    Spinal stenosis of lumbar region at multiple levels 05/23/2018    DDD (degenerative disc disease), lumbosacral 05/23/2018    Proximal muscle weakness 05/23/2018    OAB (overactive bladder) 05/23/2018    Coronary artery disease involving native coronary artery of native heart without angina pectoris 10/05/2017    Permanent atrial fibrillation 10/05/2017    Anticoagulated on Coumadin 10/05/2017    B12 deficiency due to diet 10/05/2017    Primary osteoarthritis of both knees 07/14/2017    Chronic constipation 09/26/2016    Anismus 12/14/2015    Diverticulosis of large intestine without hemorrhage 12/14/2015    Altered bowel habits 12/13/2015    Chronic back pain 09/15/2015    Osteoarthritis 11/18/2014    Long term (current) use of anticoagulants 11/06/2014    Asthma in adult 01/14/2014    Depression         PSHx:  Past Surgical History:   Procedure Laterality Date    back fusion      CARDIAC PACEMAKER PLACEMENT      CARDIAC VALVE REPLACEMENT      CHOLECYSTECTOMY      COLONOSCOPY  03/22/2013    COLONOSCOPY N/A 12/14/2015    Procedure: COLONOSCOPY;  Surgeon: Gonzalo Gorman MD;  Location: [facility];  Service: Endoscopy;  Laterality: N/A;    CORONARY ARTERY BYPASS GRAFT      ELBOW BURSA SURGERY      left    EYE SURGERY      FOOT SURGERY      MITRAL VALVE REPLACEMENT      REPLACEMENT OF PACEMAKER  09/2019    SHOULDER SURGERY      SKIN CANCER EXCISION  01/2018/ 04/2018 Dr.Thorla Jones    lower left leg     SPINE SURGERY  3 surgeries    TONSILLECTOMY          FHx:  Family History   Problem Relation Age of Onset    Cancer Father     Arthritis Father     Colon cancer Neg Hx         Social:  Social History     Socioeconomic History    Marital status:      Spouse name: marguerite Barron   Tobacco Use    Smoking status: Never    Smokeless tobacco: Never    Tobacco  "comments:     never a smoker   Substance and Sexual Activity    Alcohol use: Not Currently     Alcohol/week: 2.0 standard drinks of alcohol     Types: 1 Glasses of wine, 1 Cans of beer per week     Comment: drinks above only with meals ocassionaly    Drug use: Never    Sexual activity: Not Currently     Partners: Female     Comment: too old at 89        Allergies:  Review of patient's allergies indicates:   Allergen Reactions    Oxycodone      "it's mental/emotional        ROS:  Review of Systems   Constitutional:  Negative for activity change, appetite change, chills and fever.   HENT:  Negative for congestion, postnasal drip, rhinorrhea, sore throat and trouble swallowing.    Respiratory:  Negative for cough and shortness of breath.    Cardiovascular:  Negative for chest pain and palpitations.   Gastrointestinal:  Negative for abdominal pain, constipation, diarrhea, nausea and vomiting.   Genitourinary:  Negative for difficulty urinating.   Musculoskeletal:  Positive for arthralgias, back pain, gait problem and myalgias.   Skin:  Negative for color change and rash.   Neurological:  Negative for headaches.   All other systems reviewed and are negative.         Objective      /60   Pulse 65   Temp 98 °F (36.7 °C)   Ht 5' 9" (1.753 m)   Wt 69.3 kg (152 lb 12.5 oz)   SpO2 99%   BMI 22.56 kg/m²   Ht Readings from Last 3 Encounters:   12/06/23 5' 9" (1.753 m)   11/16/23 5' 9" (1.753 m)   11/13/23 5' 9" (1.753 m)     Wt Readings from Last 3 Encounters:   12/06/23 69.3 kg (152 lb 12.5 oz)   11/16/23 71 kg (156 lb 8.4 oz)   11/13/23 71 kg (156 lb 8.4 oz)       PHYSICAL EXAM:  Physical Exam  Vitals and nursing note reviewed.   Constitutional:       General: He is not in acute distress.     Appearance: Normal appearance.   HENT:      Head: Normocephalic and atraumatic.      Right Ear: Tympanic membrane, ear canal and external ear normal.      Left Ear: Tympanic membrane, ear canal and external ear normal.      " Nose: Nose normal. No congestion or rhinorrhea.      Mouth/Throat:      Mouth: Mucous membranes are moist.      Pharynx: Oropharynx is clear. No oropharyngeal exudate or posterior oropharyngeal erythema.   Eyes:      Extraocular Movements: Extraocular movements intact.      Conjunctiva/sclera: Conjunctivae normal.      Pupils: Pupils are equal, round, and reactive to light.   Cardiovascular:      Rate and Rhythm: Normal rate and regular rhythm.   Pulmonary:      Effort: Pulmonary effort is normal.      Breath sounds: No wheezing, rhonchi or rales.   Musculoskeletal:         General: Normal range of motion.      Cervical back: Normal range of motion.   Lymphadenopathy:      Cervical: No cervical adenopathy.   Skin:     General: Skin is warm and dry.   Neurological:      General: No focal deficit present.      Mental Status: He is alert.              LABS / IMAGING:  No results found for this or any previous visit (from the past 4368 hour(s)).      Assessment    1. Primary osteoarthritis involving multiple joints    2. Multiple joint pain    3. Dependence on other enabling machines and devices    4. Dependent relative needing care at home          Plan    Claude was seen today for follow-up.    Diagnoses and all orders for this visit:    Primary osteoarthritis involving multiple joints    Multiple joint pain    Dependence on other enabling machines and devices    Dependent relative needing care at home      Physically, he looks pretty good today.      After our discussion, reviewing the paperwork he brought with him, looks like he received a new scooter from insurance in October, 2022.  Wife has been using this, he has continue to use his old, broken one.  Recommended that he start using the scooter they got last year and we can try to get another one for his wife under her insurance.    FOLLOW-UP:  Follow up if symptoms worsen or fail to improve.    I spent a total of 45 minutes face to face and non-face to face on  the date of this visit.This includes time preparing to see the patient (eg, review of tests, notes), obtaining and/or reviewing additional history from an independent historian and/or outside medical records, documenting clinical information in the electronic health record, independently interpreting results and/or communicating results to the patient/family/caregiver, or care coordinator.    Signed by:  Hima Cannon MD

## 2023-12-26 ENCOUNTER — TELEPHONE (OUTPATIENT)
Dept: PRIMARY CARE CLINIC | Facility: CLINIC | Age: 88
End: 2023-12-26

## 2023-12-26 NOTE — TELEPHONE ENCOUNTER
----- Message from Vonda Mello sent at 12/26/2023  9:20 AM CST -----  Patient is requesting the nurse give him a call back at [phone]

## 2023-12-27 ENCOUNTER — TELEPHONE (OUTPATIENT)
Dept: PRIMARY CARE CLINIC | Facility: CLINIC | Age: 88
End: 2023-12-27

## 2023-12-27 NOTE — TELEPHONE ENCOUNTER
----- Message from Akash Castillo sent at 12/27/2023  8:46 AM CST -----  Contact: Claude Pt is calling in regards to getting a call back for the nurse to discuss questions and concerns  [phone]      Thanks

## 2023-12-27 NOTE — TELEPHONE ENCOUNTER
Spoke with Kelly Beatrice and he advised me that [facility] denied him for the motorized wheelchair. Pt advised me that someone from [employer] suppose to fax you over paperwork on what to put on order to get it approved.

## 2023-12-27 NOTE — TELEPHONE ENCOUNTER
----- Message from Milagros Gutierrez sent at 12/27/2023 11:54 AM CST -----  Type:  Patient Returning Call    Who Called: pt     Who Left Message for Patient: sharona    Does the patient know what this is regarding?:no  '    Would the patient rather a call back or a response via [facility]? call    Best Call Back Number:[phone] (home)       Additional Information:

## 2023-12-29 ENCOUNTER — TELEPHONE (OUTPATIENT)
Dept: PRIMARY CARE CLINIC | Facility: CLINIC | Age: 88
End: 2023-12-29

## 2023-12-29 NOTE — TELEPHONE ENCOUNTER
----- Message from Akash Castillo sent at 12/29/2023  2:04 PM CST -----  Contact: Claude Pt is calling in regards to getting a call back to discuss questions and concerns.  Please call back at [phone]      Thanks

## 2023-12-29 NOTE — TELEPHONE ENCOUNTER
Called and spoke with pt. [employer] denied a battery operated for Mrs. Quiroga . Pt wants to know if [employer] faxed over the forms. Informed the pt I  don't have access to the fax and try calling back on 1/2 to see if we received  the forms. Pt verbalized and understands

## 2024-01-03 ENCOUNTER — TELEPHONE (OUTPATIENT)
Dept: PRIMARY CARE CLINIC | Facility: CLINIC | Age: 89
End: 2024-01-03
Payer: MEDICARE

## 2024-01-03 NOTE — TELEPHONE ENCOUNTER
Patient states that the request for his wife's scooter has been denied. Patient is requesting, from insurance, needs an appeal for stating that she needs to scooter for daily living activity. Needs to be able to get around and on her property.

## 2024-01-03 NOTE — TELEPHONE ENCOUNTER
----- Message from Kaleb Pierce sent at 1/3/2024  2:05 PM CST -----  Contact: claude  Patient stated that he was advised by insurance company to file appeal for denial of battery operated scooter. Please call him back at [phone].        Thanks  DD

## 2024-01-05 ENCOUNTER — TELEPHONE (OUTPATIENT)
Dept: PRIMARY CARE CLINIC | Facility: CLINIC | Age: 89
End: 2024-01-05
Payer: MEDICARE

## 2024-01-05 NOTE — TELEPHONE ENCOUNTER
Patient is calling again about his wife's scooter. He needs this letter for her insurance company to appeal the denial. Patient, wife, son, daughter, grandkids all live on the 5 acres of land. She can't get around her property pushing a walker on 5 acres of land. Patient is requesting that an appeal be done for people's health for the battery operated scooter.        64 male with right 4th digit laceration, needs sutures, update tetnus

## 2024-01-05 NOTE — TELEPHONE ENCOUNTER
----- Message from Dorian Bennett sent at 1/5/2024  8:49 AM CST -----  Contact: [phone]  Patient is requesting a call in regards to some concerns and issues he is having. Pt has tried several attempts. Please call him back at  [phone]. Thanks KB

## 2024-01-05 NOTE — TELEPHONE ENCOUNTER
----- Message from Nina Olvera sent at 1/5/2024  4:47 PM CST -----  Contact: Claude Pt just sent over letter regarding prescription denial. Pt can be reached at .[phone].    Thanks  OMAR

## 2024-01-10 ENCOUNTER — TELEPHONE (OUTPATIENT)
Dept: PRIMARY CARE CLINIC | Facility: CLINIC | Age: 89
End: 2024-01-10
Payer: MEDICARE

## 2024-01-10 NOTE — TELEPHONE ENCOUNTER
----- Message from Wendy Calero sent at 1/10/2024  3:02 PM CST -----  Patient stated Dr Cannon advised him he would call at 3:00. Please call the patient back at [phone] or .[phone] to advise. x.EL

## 2024-01-15 DIAGNOSIS — I48.20 ATRIAL FIBRILLATION, CHRONIC: ICD-10-CM

## 2024-01-16 RX ORDER — WARFARIN SODIUM 5 MG/1
TABLET ORAL
Qty: 90 TABLET | Refills: 1 | Status: SHIPPED | OUTPATIENT
Start: 2024-01-16

## 2024-03-04 ENCOUNTER — TELEPHONE (OUTPATIENT)
Dept: PRIMARY CARE CLINIC | Facility: CLINIC | Age: 89
End: 2024-03-04
Payer: MEDICARE

## 2024-03-04 NOTE — TELEPHONE ENCOUNTER
----- Message from Maria Del Carmen Robles sent at 3/4/2024  8:11 AM CST -----  Contact: Kimber/wife  Kimber needs a call back at [phone], Regards to he is having pain of the right side and wants to know if he can be seen soon as possible.    Thanks  Td

## 2024-03-04 NOTE — TELEPHONE ENCOUNTER
Spoke with pt and advised him that Dr. Cannon is booked until 03/07/2024. I advised pt   to go to urgent care. Pt advised me that he will not go to urgent care. He will take the Thursday appointment

## 2024-03-07 ENCOUNTER — OFFICE VISIT (OUTPATIENT)
Dept: PRIMARY CARE CLINIC | Facility: CLINIC | Age: 89
End: 2024-03-07
Payer: MEDICARE

## 2024-03-07 ENCOUNTER — HOSPITAL ENCOUNTER (OUTPATIENT)
Dept: RADIOLOGY | Facility: HOSPITAL | Age: 89
Discharge: HOME | End: 2024-03-07
Attending: FAMILY MEDICINE
Payer: MEDICARE

## 2024-03-07 VITALS
HEART RATE: 60 BPM | SYSTOLIC BLOOD PRESSURE: 110 MMHG | BODY MASS INDEX: 23.25 KG/M2 | WEIGHT: 156.94 LBS | DIASTOLIC BLOOD PRESSURE: 74 MMHG | HEIGHT: 69 IN

## 2024-03-07 DIAGNOSIS — R07.81 RIB PAIN ON LEFT SIDE: ICD-10-CM

## 2024-03-07 DIAGNOSIS — N40.1 BENIGN PROSTATIC HYPERPLASIA WITH WEAK URINARY STREAM: ICD-10-CM

## 2024-03-07 DIAGNOSIS — R39.12 BENIGN PROSTATIC HYPERPLASIA WITH WEAK URINARY STREAM: ICD-10-CM

## 2024-03-07 DIAGNOSIS — R07.81 RIB PAIN ON LEFT SIDE: Primary | ICD-10-CM

## 2024-03-07 PROCEDURE — 71100 X-RAY EXAM RIBS UNI 2 VIEWS: CPT | Mod: TC,PN,LT

## 2024-03-07 PROCEDURE — 71100 X-RAY EXAM RIBS UNI 2 VIEWS: CPT | Mod: 26,LT,, | Performed by: RADIOLOGY

## 2024-03-07 PROCEDURE — 71046 X-RAY EXAM CHEST 2 VIEWS: CPT | Mod: 26,,, | Performed by: RADIOLOGY

## 2024-03-07 PROCEDURE — 99215 OFFICE O/P EST HI 40 MIN: CPT | Mod: S$GLB,,, | Performed by: FAMILY MEDICINE

## 2024-03-07 PROCEDURE — 99999 PR PBB SHADOW E&M-EST. PATIENT-LVL IV: CPT | Mod: PBBFAC,,, | Performed by: FAMILY MEDICINE

## 2024-03-07 PROCEDURE — G2211 COMPLEX E/M VISIT ADD ON: HCPCS | Mod: S$GLB,,, | Performed by: FAMILY MEDICINE

## 2024-03-07 PROCEDURE — 71046 X-RAY EXAM CHEST 2 VIEWS: CPT | Mod: TC,PN

## 2024-03-07 RX ORDER — CIPROFLOXACIN HYDROCHLORIDE 3 MG/ML
SOLUTION/ DROPS OPHTHALMIC
COMMUNITY
Start: 2024-02-14 | End: 2024-03-18

## 2024-03-07 RX ORDER — ALPRAZOLAM 0.25 MG/1
0.25 TABLET ORAL DAILY PRN
COMMUNITY
Start: 2023-12-06 | End: 2024-03-18

## 2024-03-07 RX ORDER — TAMSULOSIN HYDROCHLORIDE 0.4 MG/1
0.8 CAPSULE ORAL NIGHTLY
Qty: 180 CAPSULE | Refills: 3 | Status: SHIPPED | OUTPATIENT
Start: 2024-03-07 | End: 2024-03-18 | Stop reason: SDUPTHER

## 2024-03-07 RX ORDER — TRAMADOL HYDROCHLORIDE 50 MG/1
50 TABLET ORAL EVERY 8 HOURS PRN
Qty: 21 TABLET | Refills: 0 | Status: SHIPPED | OUTPATIENT
Start: 2024-03-07 | End: 2024-03-14

## 2024-03-07 NOTE — PATIENT INSTRUCTIONS
Let us get an x-ray of the chest and ribs today to make sure everything looks okay on the inside.  We will be looking for any broken ribs.  We will contact you with the results as soon as they are available.      For the pain, keep using the Tylenol.  Take two, 500 mg tablets, every 8 hours.    If the pain is severe, or you find it hard to get comfortable, you can try supplementing with my prescription tramadol.  Start with just 1/2 tablet.  Give it about 30-60 minutes to see if it helps.  If not, take the other 1/2 tablet.      If everything looks okay on the x-rays, but the pain persists, please let me know.  At that point, we will get you in with our pain specialist who might be able to place an injection in the area to really cool it off.

## 2024-03-07 NOTE — PROGRESS NOTES
Okay, good news!  Chest x-ray looks clear.  Lungs look good.  Rib x-rays also look okay.  No signs of any fractures.    Try my medications for the next few days.  If you get no relief, please let me know and maybe we can get you in with our pain specialist, Dr. Rosa.  He comes here every Tuesday and Friday.  He maybe able to do a small injection or something to get you longer term relief.

## 2024-03-07 NOTE — PROGRESS NOTES
"    [facility]       [address] [place] [address]      Phone: [phone]      Fax: [phone]    Hima Cannon MD                Office Visit  03/07/2024        Subjective      HPI:  Claude L Taylor is a 89 y.o. male presents today in clinic for "Flank Pain  ."     89-year-old gentleman presents today complaining of rib pain.      His wife, Ms. Quiroga, is present with him.  She provides some of the history.      Patient states he has been having a pain on the left side of his mid back, for the last week or so.  Pain is located on his ribcage.  Comes and goes.  Some days are better than others.  This morning, was doing okay.  Describes it as a achy sensation, soreness.  Has tried putting heat on it with no relief.  Takes Tylenol, which give some mild relief.  Does not recall any injury to the area.    Also has BPH.  Uses Flomax daily.  Helps him urinate.  Needs refill.    PMH:  CAD, AFib, PVD, BPH, OA/DDD/chronic pains.  PSH:  Pacemaker, heart ablation/heart valve repair/heart catheterization, CABG, tonsils, multiple MSK (back, foot), gallbladder  Allergies:  Oxycodone makes him very emotional  Social:  Retired.  .  T:  Denies  A:  Occasionally  D:  Denies    Exercise:  No regular exercise program.  Has rolling walker and scooter to help get around.    Cardiology:  Dr. Barrios  Ophthalmology:  Dr. Brown, Dr. Sampson  Dermatology: Dr. Morfin          The following were updated and reviewed by myself in the chart: medications, past medical history, past surgical history, family history, social history, and allergies.     Medications:  Current Outpatient Medications on File Prior to Visit   Medication Sig Dispense Refill    ALPRAZolam (XANAX) 0.25 MG tablet Take 0.25 mg by mouth daily as needed.      ciprofloxacin HCl (CILOXAN) 0.3 % ophthalmic solution Place into the right eye.      fluocinonide 0.05% (LIDEX) 0.05 % cream Apply to rash on legs twice daily " as needed for flares. Do not use on face or sensitive areas. 60 g 6    fluorometholone 0.1% (FML) 0.1 % DrpS Place 1 drop in both eyes twice daily 5 mL 5    furosemide (LASIX) 20 MG tablet Take 1 tablet by mouth as needed. 180 tablet 3    hydrOXYzine pamoate (VISTARIL) 25 MG Cap Take 2 capsules (50 mg total) by mouth nightly as needed (itching, sleep). 180 capsule 3    loteprednol (LOTEMAX) 0.5 % ophthalmic suspension Place 1 drop into both eyes every morning. 45 mL 1    polymyxin B sulf-trimethoprim (POLYTRIM) 10,000 unit- 1 mg/mL Drop Place 1 drop in both eyes three times daily 10 mL 0    traZODone (DESYREL) 50 MG tablet Take 1 tablet (50 mg total) by mouth nightly as needed for Insomnia. 90 tablet 3    warfarin (COUMADIN) 5 MG tablet Take 1 tablet by mouth every day or as directed by coumadin clinic 90 tablet 1    [DISCONTINUED] tamsulosin (FLOMAX) 0.4 mg Cap Take 2 capsules (0.8 mg total) by mouth every evening. For prostate and urine flow 180 capsule 3    neomycin-polymyxin-dexamethasone (DEXACINE) 3.5 mg/g-10,000 unit/g-0.1 % Oint Apply 1/4 strip to both eyelids twice daily 3.5 g 0     No current facility-administered medications on file prior to visit.        PMHx:  Past Medical History:   Diagnosis Date    *Atrial fibrillation     pacer Dr Barrios    Anticoagulant long-term use     Cancer     skin    Cervical neck pain with evidence of disc disease     Colon polyps 12/14/2015    Coronary artery disease     Depression     Diverticulosis     Hypertension     Kidney stone     Skin cancer       Patient Active Problem List    Diagnosis Date Noted    Positive SHOAIB (antinuclear antibody) 05/02/2022    Keratoconjunctivitis sicca due to decreased tear production, bilateral 05/02/2022    Primary osteoarthritis involving multiple joints 05/02/2022    Dependence on other enabling machines and devices 11/09/2021    Atrial fibrillation, chronic 05/03/2021    Osteoporosis 05/03/2021    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    SOB  (shortness of breath) on exertion 03/27/2019    Benign prostatic hyperplasia with weak urinary stream 03/27/2019    Cardiac pacemaker 07/31/2018    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    Nonrheumatic mitral (valve) insufficiency 07/31/2018    Peripheral vascular disease 07/31/2018    Hx of CABG 05/23/2018    Spinal stenosis of lumbar region at multiple levels 05/23/2018    DDD (degenerative disc disease), lumbosacral 05/23/2018    Proximal muscle weakness 05/23/2018    OAB (overactive bladder) 05/23/2018    Coronary artery disease involving native coronary artery of native heart without angina pectoris 10/05/2017    Permanent atrial fibrillation 10/05/2017    Anticoagulated on Coumadin 10/05/2017    B12 deficiency due to diet 10/05/2017    Primary osteoarthritis of both knees 07/14/2017    Chronic constipation 09/26/2016    Anismus 12/14/2015    Diverticulosis of large intestine without hemorrhage 12/14/2015    Altered bowel habits 12/13/2015    Chronic back pain 09/15/2015    Osteoarthritis 11/18/2014    Long term (current) use of anticoagulants 11/06/2014    Asthma in adult 01/14/2014    Depression         PSHx:  Past Surgical History:   Procedure Laterality Date    back fusion      CARDIAC PACEMAKER PLACEMENT      CARDIAC VALVE REPLACEMENT      CHOLECYSTECTOMY      COLONOSCOPY  03/22/2013    COLONOSCOPY N/A 12/14/2015    Procedure: COLONOSCOPY;  Surgeon: Gonzalo Gorman MD;  Location: [facility];  Service: Endoscopy;  Laterality: N/A;    CORONARY ARTERY BYPASS GRAFT      ELBOW BURSA SURGERY      left    EYE SURGERY      FOOT SURGERY      MITRAL VALVE REPLACEMENT      REPLACEMENT OF PACEMAKER  09/2019    SHOULDER SURGERY      SKIN CANCER EXCISION  01/2018/ 04/2018 Dr.Thorla Jones    lower left leg     SPINE SURGERY  3 surgeries    TONSILLECTOMY          FHx:  Family History   Problem Relation Age of Onset    Cancer Father     Arthritis Father     Colon cancer Neg Hx         Social:  Social History  "    Socioeconomic History    Marital status:      Spouse name: marugerite Barron   Tobacco Use    Smoking status: Never    Smokeless tobacco: Never    Tobacco comments:     never a smoker   Substance and Sexual Activity    Alcohol use: Not Currently     Alcohol/week: 2.0 standard drinks of alcohol     Types: 1 Glasses of wine, 1 Cans of beer per week     Comment: drinks above only with meals ocassionaly    Drug use: Never    Sexual activity: Not Currently     Partners: Female     Comment: too old at 89        Allergies:  Review of patient's allergies indicates:   Allergen Reactions    Oxycodone      "it's mental/emotional        ROS:  Review of Systems   Musculoskeletal:  Positive for arthralgias and back pain.          Objective      /74   Pulse 60   Ht 5' 9" (1.753 m)   Wt 71.2 kg (156 lb 15.5 oz)   BMI 23.18 kg/m²   Ht Readings from Last 3 Encounters:   03/07/24 5' 9" (1.753 m)   12/06/23 5' 9" (1.753 m)   11/16/23 5' 9" (1.753 m)     Wt Readings from Last 3 Encounters:   03/07/24 71.2 kg (156 lb 15.5 oz)   12/06/23 69.3 kg (152 lb 12.5 oz)   11/16/23 71 kg (156 lb 8.4 oz)       PHYSICAL EXAM:  Physical Exam  Vitals and nursing note reviewed.   Constitutional:       General: He is not in acute distress.     Appearance: Normal appearance.   HENT:      Head: Normocephalic and atraumatic.      Right Ear: Tympanic membrane, ear canal and external ear normal.      Left Ear: Tympanic membrane, ear canal and external ear normal.      Nose: Nose normal. No congestion or rhinorrhea.      Mouth/Throat:      Mouth: Mucous membranes are moist.      Pharynx: Oropharynx is clear. No oropharyngeal exudate or posterior oropharyngeal erythema.   Eyes:      Extraocular Movements: Extraocular movements intact.      Conjunctiva/sclera: Conjunctivae normal.      Pupils: Pupils are equal, round, and reactive to light.   Cardiovascular:      Rate and Rhythm: Normal rate and regular rhythm.   Pulmonary:      Effort: " Pulmonary effort is normal.      Breath sounds: No wheezing, rhonchi or rales.   Musculoskeletal:         General: Normal range of motion.      Cervical back: Normal range of motion.      Comments: Upper back, left side, tender to palpation between mid axillary and mid scapula lines, along ribs three, four, five.   Lymphadenopathy:      Cervical: No cervical adenopathy.   Skin:     General: Skin is warm and dry.   Neurological:      General: No focal deficit present.      Mental Status: He is alert.              LABS / IMAGING:  No results found for this or any previous visit (from the past 4368 hour(s)).      Assessment    1. Rib pain on left side    2. Benign prostatic hyperplasia with weak urinary stream          Plan    Claude was seen today for flank pain.    Diagnoses and all orders for this visit:    Rib pain on left side  -     X-Ray Ribs 2 View Left; Future  -     X-Ray Chest PA And Lateral; Future  -     traMADoL (ULTRAM) 50 mg tablet; Take 1 tablet (50 mg total) by mouth every 8 (eight) hours as needed for Pain.    Benign prostatic hyperplasia with weak urinary stream  -     tamsulosin (FLOMAX) 0.4 mg Cap; Take 2 capsules (0.8 mg total) by mouth every evening. For prostate and urine flow    Has some mild tenderness along the upper ribs.  We will get chest x-ray, rib x-ray today.    X-rays all look clear.  No signs of any fractures, masses, other abnormalities that could be causing his pain.      The ribs themselves are tender to palpation, so I do not think it is something internal.  He is on warfarin, so will avoid NSAIDs.  Continue Tylenol.  Can supplement with 1/2-1 tablet of tramadol, as needed.  If no improvement, we can get him in with Dr. MARCIAL to see if he can offer maybe a trigger point in injection or some type of nerve block to offer relief?    Refill of Flomax sent to the pharmacy.    FOLLOW-UP:  Follow up if symptoms worsen or fail to improve.    I spent a total of 45 minutes face to face and  non-face to face on the date of this visit.This includes time preparing to see the patient (eg, review of tests, notes), obtaining and/or reviewing additional history from an independent historian and/or outside medical records, documenting clinical information in the electronic health record, independently interpreting results and/or communicating results to the patient/family/caregiver, or care coordinator.  Visit today included increased complexity associated with the care of the episodic problem addressed and managing the longitudinal care of the patient due to the serious and/or complex managed problem(s).    Signed by:  Hima Cannon MD

## 2024-03-18 ENCOUNTER — OFFICE VISIT (OUTPATIENT)
Dept: PRIMARY CARE CLINIC | Facility: CLINIC | Age: 89
End: 2024-03-18
Payer: MEDICARE

## 2024-03-18 VITALS
SYSTOLIC BLOOD PRESSURE: 138 MMHG | HEART RATE: 61 BPM | WEIGHT: 154.31 LBS | HEIGHT: 69 IN | OXYGEN SATURATION: 97 % | DIASTOLIC BLOOD PRESSURE: 68 MMHG | BODY MASS INDEX: 22.85 KG/M2 | TEMPERATURE: 97 F

## 2024-03-18 DIAGNOSIS — M48.061 SPINAL STENOSIS OF LUMBAR REGION AT MULTIPLE LEVELS: ICD-10-CM

## 2024-03-18 DIAGNOSIS — R39.12 BENIGN PROSTATIC HYPERPLASIA WITH WEAK URINARY STREAM: ICD-10-CM

## 2024-03-18 DIAGNOSIS — G47.9 SLEEP DIFFICULTIES: ICD-10-CM

## 2024-03-18 DIAGNOSIS — M79.672 PAIN IN BOTH FEET: ICD-10-CM

## 2024-03-18 DIAGNOSIS — Z95.1 HX OF CABG: ICD-10-CM

## 2024-03-18 DIAGNOSIS — M79.671 PAIN IN BOTH FEET: ICD-10-CM

## 2024-03-18 DIAGNOSIS — M51.37 DDD (DEGENERATIVE DISC DISEASE), LUMBOSACRAL: ICD-10-CM

## 2024-03-18 DIAGNOSIS — M54.50 ACUTE LEFT-SIDED LOW BACK PAIN WITHOUT SCIATICA: Primary | ICD-10-CM

## 2024-03-18 DIAGNOSIS — I25.10 CORONARY ARTERY DISEASE INVOLVING NATIVE CORONARY ARTERY OF NATIVE HEART WITHOUT ANGINA PECTORIS: ICD-10-CM

## 2024-03-18 DIAGNOSIS — N40.1 BENIGN PROSTATIC HYPERPLASIA WITH WEAK URINARY STREAM: ICD-10-CM

## 2024-03-18 DIAGNOSIS — I73.9 CLAUDICATION OF BOTH LOWER EXTREMITIES: ICD-10-CM

## 2024-03-18 PROCEDURE — 99999 PR PBB SHADOW E&M-EST. PATIENT-LVL IV: CPT | Mod: PBBFAC,,, | Performed by: FAMILY MEDICINE

## 2024-03-18 PROCEDURE — G2211 COMPLEX E/M VISIT ADD ON: HCPCS | Mod: S$GLB,,, | Performed by: FAMILY MEDICINE

## 2024-03-18 PROCEDURE — 99215 OFFICE O/P EST HI 40 MIN: CPT | Mod: S$GLB,,, | Performed by: FAMILY MEDICINE

## 2024-03-18 RX ORDER — TAMSULOSIN HYDROCHLORIDE 0.4 MG/1
0.8 CAPSULE ORAL NIGHTLY
Qty: 180 CAPSULE | Refills: 3 | Status: SHIPPED | OUTPATIENT
Start: 2024-03-18

## 2024-03-18 RX ORDER — QUETIAPINE FUMARATE 25 MG/1
25 TABLET, FILM COATED ORAL NIGHTLY PRN
Qty: 90 TABLET | Refills: 3 | Status: SHIPPED | OUTPATIENT
Start: 2024-03-18 | End: 2025-03-18

## 2024-03-18 RX ORDER — TRAMADOL HYDROCHLORIDE 50 MG/1
50 TABLET ORAL EVERY 6 HOURS PRN
COMMUNITY
End: 2024-03-18 | Stop reason: SDUPTHER

## 2024-03-18 RX ORDER — TRAMADOL HYDROCHLORIDE 50 MG/1
50 TABLET ORAL EVERY 4 HOURS PRN
Qty: 42 TABLET | Refills: 0 | Status: SHIPPED | OUTPATIENT
Start: 2024-03-18

## 2024-03-18 NOTE — PROGRESS NOTES
"    [facility]       [address] [place] [address]      Phone: [phone]      Fax: [phone]    Hima Cannon MD                Office Visit  03/18/2024        Subjective      HPI:  Claude L Taylor is a 89 y.o. male presents today in clinic for "Follow-up  ."     89-year-old gentleman presents today to discuss multiple issues.      His wife, Ms. Quiroga, is present with him.  She provides some of the history.      Patient states that his old age is showing.  Feels like he does not have any strength.  Feels very weak, especially reaching overhead.  If he is sitting on the floor, he has trouble getting up.  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  Has done physical therapy in the past, but did not really see significant improvement with it.      Also reports that his toes are stiff.  At night, he has to move his foot from underneath the covers to ease the pain.  Mostly on the right foot, but the left foot, little toes also feel similar.  Feet often feel cold.  He wears socks to keep them warm, but that also makes his feet hurt.  If he walks too much, the toes tend hurt more.  They get a bit better when resting.    Also states he has a small hernia in the lower, right side, of his abdomen.  Does not bother him very often.  Has seen surgery for it in the past, but not ready to treat.  He will let us know if it bothers him more.      Has some pains in his lower back, left side.  Just above the left hip area.  Hurts more when he lays flat on his back.  When it bothers him, he will stop and rest, which sometimes help.  Also takes Tylenol.  Was having some rib pains a few weeks ago.  Used some tramadol for that, which helped significantly.    Typically goes to bed around 10:00 p.m..  Last night, he could not fall asleep.  Took some Tylenol, but that did not help.  He laid there, tossed, turned until about 12:30 a.m..  " Finally, fell asleep until 5:00 a.m..  When he does not get much sleep, it makes him feel really bad the next day.  Has tried multiple sleep medications in the past, including trazodone.  That works only some of the time.  Would like something a bit stronger.    Also has BPH.  Uses Flomax daily.  Helps him urinate.  Needs refill.    PMH:  CAD, AFib, PVD, BPH, OA/DDD/chronic pains.  PSH:  Pacemaker, heart ablation/heart valve repair/heart catheterization, CABG, tonsils, multiple MSK (back, foot), gallbladder  Allergies:  Oxycodone makes him very emotional  Social:  Retired.  .  T:  Denies  A:  Occasionally  D:  Denies    Exercise:  No regular exercise program.  Has rolling walker and scooter to help get around.    Cardiology:  Dr. Barrios  Ophthalmology:  Dr. Brown, Dr. Sampson  Dermatology: Dr. Morfin          The following were updated and reviewed by myself in the chart: medications, past medical history, past surgical history, family history, social history, and allergies.     Medications:  Current Outpatient Medications on File Prior to Visit   Medication Sig Dispense Refill    fluorometholone 0.1% (FML) 0.1 % DrpS Place 1 drop in both eyes twice daily 5 mL 5    furosemide (LASIX) 20 MG tablet Take 1 tablet by mouth as needed. 180 tablet 3    warfarin (COUMADIN) 5 MG tablet Take 1 tablet by mouth every day or as directed by coumadin clinic 90 tablet 1    [DISCONTINUED] tamsulosin (FLOMAX) 0.4 mg Cap Take 2 capsules (0.8 mg total) by mouth every evening. For prostate and urine flow 180 capsule 3    [DISCONTINUED] traMADoL (ULTRAM) 50 mg tablet Take 50 mg by mouth every 6 (six) hours as needed for Pain.      [DISCONTINUED] ALPRAZolam (XANAX) 0.25 MG tablet Take 0.25 mg by mouth daily as needed.      [DISCONTINUED] ciprofloxacin HCl (CILOXAN) 0.3 % ophthalmic solution Place into the right eye.      [DISCONTINUED] fluocinonide 0.05% (LIDEX) 0.05 % cream Apply to rash on legs twice daily as needed for flares. Do  not use on face or sensitive areas. 60 g 6    [DISCONTINUED] hydrOXYzine pamoate (VISTARIL) 25 MG Cap Take 2 capsules (50 mg total) by mouth nightly as needed (itching, sleep). 180 capsule 3    [DISCONTINUED] loteprednol (LOTEMAX) 0.5 % ophthalmic suspension Place 1 drop into both eyes every morning. 45 mL 1    [DISCONTINUED] neomycin-polymyxin-dexamethasone (DEXACINE) 3.5 mg/g-10,000 unit/g-0.1 % Oint Apply 1/4 strip to both eyelids twice daily 3.5 g 0    [DISCONTINUED] polymyxin B sulf-trimethoprim (POLYTRIM) 10,000 unit- 1 mg/mL Drop Place 1 drop in both eyes three times daily 10 mL 0    [DISCONTINUED] traZODone (DESYREL) 50 MG tablet Take 1 tablet (50 mg total) by mouth nightly as needed for Insomnia. 90 tablet 3     No current facility-administered medications on file prior to visit.        PMHx:  Past Medical History:   Diagnosis Date    *Atrial fibrillation     pacer Dr Barrios    Anticoagulant long-term use     Cancer     skin    Cervical neck pain with evidence of disc disease     Colon polyps 12/14/2015    Coronary artery disease     Depression     Diverticulosis     Hypertension     Kidney stone     Skin cancer       Patient Active Problem List    Diagnosis Date Noted    Positive SHOAIB (antinuclear antibody) 05/02/2022    Keratoconjunctivitis sicca due to decreased tear production, bilateral 05/02/2022    Primary osteoarthritis involving multiple joints 05/02/2022    Dependence on other enabling machines and devices 11/09/2021    Atrial fibrillation, chronic 05/03/2021    Osteoporosis 05/03/2021    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    SOB (shortness of breath) on exertion 03/27/2019    Benign prostatic hyperplasia with weak urinary stream 03/27/2019    Cardiac pacemaker 07/31/2018    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    Nonrheumatic mitral (valve) insufficiency 07/31/2018    Peripheral vascular disease 07/31/2018    Hx of CABG 05/23/2018    Spinal stenosis of lumbar region at multiple levels 05/23/2018     DDD (degenerative disc disease), lumbosacral 05/23/2018    Proximal muscle weakness 05/23/2018    OAB (overactive bladder) 05/23/2018    Coronary artery disease involving native coronary artery of native heart without angina pectoris 10/05/2017    Permanent atrial fibrillation 10/05/2017    Anticoagulated on Coumadin 10/05/2017    B12 deficiency due to diet 10/05/2017    Primary osteoarthritis of both knees 07/14/2017    Chronic constipation 09/26/2016    Anismus 12/14/2015    Diverticulosis of large intestine without hemorrhage 12/14/2015    Altered bowel habits 12/13/2015    Chronic back pain 09/15/2015    Osteoarthritis 11/18/2014    Long term (current) use of anticoagulants 11/06/2014    Asthma in adult 01/14/2014    Depression         PSHx:  Past Surgical History:   Procedure Laterality Date    back fusion      CARDIAC PACEMAKER PLACEMENT      CARDIAC VALVE REPLACEMENT      CHOLECYSTECTOMY      COLONOSCOPY  03/22/2013    COLONOSCOPY N/A 12/14/2015    Procedure: COLONOSCOPY;  Surgeon: Gonzalo Gorman MD;  Location: [facility];  Service: Endoscopy;  Laterality: N/A;    CORONARY ARTERY BYPASS GRAFT      ELBOW BURSA SURGERY      left    EYE SURGERY      FOOT SURGERY      MITRAL VALVE REPLACEMENT      REPLACEMENT OF PACEMAKER  09/2019    SHOULDER SURGERY      SKIN CANCER EXCISION  01/2018/ 04/2018 Dr.Thorla Jones    lower left leg     SPINE SURGERY  3 surgeries    TONSILLECTOMY          FHx:  Family History   Problem Relation Age of Onset    Cancer Father     Arthritis Father     Colon cancer Neg Hx         Social:  Social History     Socioeconomic History    Marital status:      Spouse name: marguerite Barron   Tobacco Use    Smoking status: Never    Smokeless tobacco: Never    Tobacco comments:     never a smoker   Substance and Sexual Activity    Alcohol use: Not Currently     Alcohol/week: 2.0 standard drinks of alcohol     Types: 1 Glasses of wine, 1 Cans of beer per week     Comment: drinks above only with  "meals ocassionaly    Drug use: Never    Sexual activity: Not Currently     Partners: Female     Comment: too old at 89        Allergies:  Review of patient's allergies indicates:   Allergen Reactions    Oxycodone      "it's mental/emotional        ROS:  Review of Systems   Constitutional:  Negative for activity change, appetite change, chills and fever.   HENT:  Negative for congestion, postnasal drip, rhinorrhea, sore throat and trouble swallowing.    Respiratory:  Negative for cough and shortness of breath.    Cardiovascular:  Negative for chest pain and palpitations.   Gastrointestinal:  Negative for abdominal pain, constipation, diarrhea, nausea and vomiting.   Genitourinary:  Negative for difficulty urinating.   Musculoskeletal:  Positive for arthralgias, back pain and myalgias.   Skin:  Negative for color change and rash.   Neurological:  Negative for headaches.   Psychiatric/Behavioral:  Positive for sleep disturbance.    All other systems reviewed and are negative.         Objective      /68   Pulse 61   Temp 97.2 °F (36.2 °C)   Ht 5' 9" (1.753 m)   Wt 70 kg (154 lb 5.2 oz)   SpO2 97%   BMI 22.79 kg/m²   Ht Readings from Last 3 Encounters:   03/18/24 5' 9" (1.753 m)   03/07/24 5' 9" (1.753 m)   12/06/23 5' 9" (1.753 m)     Wt Readings from Last 3 Encounters:   03/18/24 70 kg (154 lb 5.2 oz)   03/07/24 71.2 kg (156 lb 15.5 oz)   12/06/23 69.3 kg (152 lb 12.5 oz)       PHYSICAL EXAM:  Physical Exam  Vitals and nursing note reviewed.   Constitutional:       General: He is not in acute distress.     Appearance: Normal appearance.   HENT:      Head: Normocephalic and atraumatic.      Right Ear: Tympanic membrane, ear canal and external ear normal.      Left Ear: Tympanic membrane, ear canal and external ear normal.      Nose: Nose normal. No congestion or rhinorrhea.      Mouth/Throat:      Mouth: Mucous membranes are moist.      Pharynx: Oropharynx is clear. No oropharyngeal exudate or posterior " oropharyngeal erythema.   Eyes:      Extraocular Movements: Extraocular movements intact.      Conjunctiva/sclera: Conjunctivae normal.      Pupils: Pupils are equal, round, and reactive to light.   Cardiovascular:      Rate and Rhythm: Normal rate and regular rhythm.      Pulses:           Dorsalis pedis pulses are 0 on the right side and 0 on the left side.   Pulmonary:      Effort: Pulmonary effort is normal.      Breath sounds: No wheezing, rhonchi or rales.   Musculoskeletal:         General: Normal range of motion.      Cervical back: Normal range of motion.      Right lower leg: No edema.      Left lower leg: No edema.   Feet:      Comments: Toes are cold to the touch.  Decreased capillary refill.  Unable to manually palpate DP pulses.  Lymphadenopathy:      Cervical: No cervical adenopathy.   Skin:     General: Skin is warm and dry.   Neurological:      General: No focal deficit present.      Mental Status: He is alert.              LABS / IMAGING:  No results found for this or any previous visit (from the past 4368 hour(s)).      Assessment    1. Acute left-sided low back pain without sciatica    2. DDD (degenerative disc disease), lumbosacral    3. Spinal stenosis of lumbar region at multiple levels    4. Benign prostatic hyperplasia with weak urinary stream    5. Coronary artery disease involving native coronary artery of native heart without angina pectoris    6. Hx of CABG    7. Pain in both feet    8. Claudication of both lower extremities    9. Sleep difficulties          Plan    Claude was seen today for follow-up.    Diagnoses and all orders for this visit:    Acute left-sided low back pain without sciatica  -     traMADoL (ULTRAM) 50 mg tablet; Take 1 tablet (50 mg total) by mouth every 4 (four) hours as needed for Pain.    DDD (degenerative disc disease), lumbosacral  -     traMADoL (ULTRAM) 50 mg tablet; Take 1 tablet (50 mg total) by mouth every 4 (four) hours as needed for Pain.    Spinal  stenosis of lumbar region at multiple levels  -     traMADoL (ULTRAM) 50 mg tablet; Take 1 tablet (50 mg total) by mouth every 4 (four) hours as needed for Pain.    Benign prostatic hyperplasia with weak urinary stream  -     tamsulosin (FLOMAX) 0.4 mg Cap; Take 2 capsules (0.8 mg total) by mouth every evening. For prostate and urine flow    Coronary artery disease involving native coronary artery of native heart without angina pectoris  -     US Lower Extremity Arteries Bilateral; Future    Hx of CABG  -     US Lower Extremity Arteries Bilateral; Future    Pain in both feet  -     traMADoL (ULTRAM) 50 mg tablet; Take 1 tablet (50 mg total) by mouth every 4 (four) hours as needed for Pain.    Claudication of both lower extremities  -     US Lower Extremity Arteries Bilateral; Future    Sleep difficulties  -     QUEtiapine (SEROQUEL) 25 MG Tab; Take 1 tablet (25 mg total) by mouth nightly as needed (sleep).    Physically, he looks okay.      I wonder if the toe pain/stiffness, could be claudication?  He does have extensive history of CAD/CABG.  We will get arterial ultrasound of both lower extremities to check for flow issues.    In the meantime, continue using Tylenol for back/leg/foot pain.  Okay to use 1/2 tablet of tramadol, as needed, for severe pain.    For sleep, will try using low-dose Seroquel to see if that helps.    Refill of Flomax sent to the pharmacy.      FOLLOW-UP:  Follow up in about 5 months (around 8/18/2024) for check up.    I spent a total of 45 minutes face to face and non-face to face on the date of this visit.This includes time preparing to see the patient (eg, review of tests, notes), obtaining and/or reviewing additional history from an independent historian and/or outside medical records, documenting clinical information in the electronic health record, independently interpreting results and/or communicating results to the patient/family/caregiver, or care coordinator.  Visit today included  increased complexity associated with the care of the episodic problem addressed and managing the longitudinal care of the patient due to the serious and/or complex managed problem(s).    Signed by:  Hima Cannon MD

## 2024-03-18 NOTE — PATIENT INSTRUCTIONS
Overall, you look pretty good today.      For your sleep issues, let us try using a very low dose of Seroquel (quetiapine) at bedtime.  This may help you fall asleep, and stay asleep, but not leave you groggy the next day.    For your foot/toe pains, let us check the circulation down there.  I am placing an order for an arterial ultrasound.  If we find any blockages that could be contributing, we can get you with the vascular specialist to discuss restoring blood flow to the area.  If the blood vessels are nice and open, then it is likely something with the muscles or bones causing the discomfort.    Same situation with your lower back/hip.  Continue to use Tylenol, as needed, for the discomfort.  If Tylenol does not give you complete relief, start with just 1/2 tablet of my tramadol.  That should help you feel better.  If not, you can take the other half tablet about 1 hour later.  You can also try taking 1/2 tablet of it at bedtime if the feet are hurting.    Refills of your tamsulosin has been sent to the pharmacy.  Please continue taking them, as directed.

## 2024-03-19 ENCOUNTER — HOSPITAL ENCOUNTER (OUTPATIENT)
Dept: RADIOLOGY | Facility: HOSPITAL | Age: 89
Discharge: HOME | End: 2024-03-19
Attending: FAMILY MEDICINE
Payer: MEDICARE

## 2024-03-19 DIAGNOSIS — Z95.1 HX OF CABG: ICD-10-CM

## 2024-03-19 DIAGNOSIS — M79.671 PAIN IN BOTH FEET: ICD-10-CM

## 2024-03-19 DIAGNOSIS — I73.9 PAD (PERIPHERAL ARTERY DISEASE): Primary | ICD-10-CM

## 2024-03-19 DIAGNOSIS — I73.9 CLAUDICATION OF BOTH LOWER EXTREMITIES: ICD-10-CM

## 2024-03-19 DIAGNOSIS — M79.672 PAIN IN BOTH FEET: ICD-10-CM

## 2024-03-19 DIAGNOSIS — I25.10 CORONARY ARTERY DISEASE INVOLVING NATIVE CORONARY ARTERY OF NATIVE HEART WITHOUT ANGINA PECTORIS: ICD-10-CM

## 2024-03-19 PROCEDURE — 93925 LOWER EXTREMITY STUDY: CPT | Mod: 26,,, | Performed by: STUDENT IN AN ORGANIZED HEALTH CARE EDUCATION/TRAINING PROGRAM

## 2024-03-19 PROCEDURE — 93925 LOWER EXTREMITY STUDY: CPT | Mod: TC,PN

## 2024-03-19 NOTE — PROGRESS NOTES
Well, this is interesting.      There does seem to be some blockage in the artery in the right leg.  This artery does supply blood flow to the foot and toes.  I wonder if this could be causing your pains at night?      I am going to place a referral to have you see a vascular specialist.  Dr. White goes to [facility] here in [place].  I am going to fax a referral to his office and ask him to take a look at these results.  He will discuss options on restoring blood flow with you.

## 2024-03-20 DIAGNOSIS — I73.9 PERIPHERAL VASCULAR DISEASE: Primary | ICD-10-CM

## 2024-03-22 DIAGNOSIS — M79.606 PAIN OF LOWER EXTREMITY, UNSPECIFIED LATERALITY: Primary | ICD-10-CM

## 2024-03-22 DIAGNOSIS — I73.9 PAD (PERIPHERAL ARTERY DISEASE): Primary | ICD-10-CM

## 2024-04-22 ENCOUNTER — OFFICE VISIT (OUTPATIENT)
Dept: PRIMARY CARE CLINIC | Facility: CLINIC | Age: 89
End: 2024-04-22
Payer: MEDICARE

## 2024-04-22 ENCOUNTER — HOSPITAL ENCOUNTER (OUTPATIENT)
Dept: RADIOLOGY | Facility: HOSPITAL | Age: 89
Discharge: HOME | End: 2024-04-22
Attending: FAMILY MEDICINE
Payer: MEDICARE

## 2024-04-22 ENCOUNTER — TELEPHONE (OUTPATIENT)
Dept: PRIMARY CARE CLINIC | Facility: CLINIC | Age: 89
End: 2024-04-22
Payer: MEDICARE

## 2024-04-22 VITALS
WEIGHT: 156.31 LBS | BODY MASS INDEX: 23.15 KG/M2 | HEIGHT: 69 IN | HEART RATE: 60 BPM | SYSTOLIC BLOOD PRESSURE: 132 MMHG | DIASTOLIC BLOOD PRESSURE: 70 MMHG

## 2024-04-22 DIAGNOSIS — R07.81 RIB PAIN ON LEFT SIDE: Primary | ICD-10-CM

## 2024-04-22 DIAGNOSIS — R07.81 RIB PAIN ON LEFT SIDE: ICD-10-CM

## 2024-04-22 PROCEDURE — 99499 UNLISTED E&M SERVICE: CPT | Mod: S$GLB,,, | Performed by: FAMILY MEDICINE

## 2024-04-22 PROCEDURE — 71100 X-RAY EXAM RIBS UNI 2 VIEWS: CPT | Mod: TC,PN,LT

## 2024-04-22 PROCEDURE — 71100 X-RAY EXAM RIBS UNI 2 VIEWS: CPT | Mod: 26,LT,, | Performed by: RADIOLOGY

## 2024-04-22 PROCEDURE — 99999 PR PBB SHADOW E&M-EST. PATIENT-LVL III: CPT | Mod: PBBFAC,,, | Performed by: FAMILY MEDICINE

## 2024-04-22 RX ORDER — HYDROCODONE BITARTRATE AND ACETAMINOPHEN 5; 325 MG/1; MG/1
1 TABLET ORAL EVERY 12 HOURS PRN
Qty: 10 TABLET | Refills: 0 | Status: SHIPPED | OUTPATIENT
Start: 2024-04-22

## 2024-04-22 NOTE — TELEPHONE ENCOUNTER
----- Message from Wendy Calero sent at 4/22/2024  2:35 PM CDT -----  Patient is requesting a call back regarding  xray taken earlier. Call back number is  [phone]. Thx.EL

## 2024-04-22 NOTE — PATIENT INSTRUCTIONS
Let us go ahead and get an x-ray of the ribcage just to make sure everything looks okay on the inside.  We will contact you with the results as soon as they are available.      In the meantime, take it easy for the next couple of days.    Apply ice or cold compress to the area three or 4 times per day, 5-10 minutes at a time.    Alternate the cold compress with heat.  Use a warm washcloth or heating pad (with a cover) for about 5-10 minutes at a time.    Continue to use OTC Tylenol (two, 500 mg tablets) every 8 hours, as needed.  Since the tramadol does not seem to be helping, go ahead and try either 1/2 or one whole tablet of Norco.  Use this very sparingly and only for severe pain.

## 2024-04-22 NOTE — TELEPHONE ENCOUNTER
Called and spoke with pt. Pt was informed xray was npt resulted and would call him back once I have received the results. Pt verbalized and understands

## 2024-06-20 ENCOUNTER — TELEPHONE (OUTPATIENT)
Dept: PRIMARY CARE CLINIC | Facility: CLINIC | Age: 89
End: 2024-06-20
Payer: MEDICARE

## 2024-06-20 NOTE — TELEPHONE ENCOUNTER
Spoke with pts wife and she advised me that pts ear is bleeding a lot and she want to know if Dr. Cannon can see him. I advised pts wife that Dr. Cannon don't have ant openings and pt need to go to ER. Pts wife verbalized understanding

## 2024-06-20 NOTE — TELEPHONE ENCOUNTER
----- Message from Yazan Gamboa sent at 6/20/2024  8:28 AM CDT -----  Contact: WIFE   .Type: Patient Call Back        Who called:   PATIENT WIFE      What is the request in detail:    PATIENT RIGHT EAR HAS STARTED BLEEDING AND NEEDS TO SEE PROVIDER AS SOON AS HE CAN   Can the clinic reply by JOSE MANUELSNER?           Would the patient rather a call back or a response via [facility]?      CALL BACK   Best call back number:   [phone]

## 2024-07-29 ENCOUNTER — TELEPHONE (OUTPATIENT)
Dept: PRIMARY CARE CLINIC | Facility: CLINIC | Age: 89
End: 2024-07-29
Payer: MEDICARE

## 2024-07-29 NOTE — TELEPHONE ENCOUNTER
----- Message from Dorian Bennett sent at 7/29/2024  7:59 AM CDT -----  Contact: [phone]  Type:  Needs Medical Advice    Who Called: Claude   Symptoms (please be specific): n/a   How long has patient had these symptoms:  n/a  Pharmacy name and phone #:      [facility] - [place], [place] - [address] [facility]  [address] [place] [address]  Phone: [phone] Fax: [phone]     Would the patient rather a call back or a response via [facility]? Call Back   Best Call Back Number: [phone]   Additional Information: pt is requesting a call in regards to seeing if paperwork for medication was sent over to the pharmacy.       Thanks KB

## 2024-07-29 NOTE — TELEPHONE ENCOUNTER
Spoke with pt and he advised me that he mailed in a paper for Dr. Cannon to sign. I advised pt that we haven't received anything. I also advised pt I will be on the look out for it

## 2024-07-31 ENCOUNTER — TELEPHONE (OUTPATIENT)
Dept: PRIMARY CARE CLINIC | Facility: CLINIC | Age: 89
End: 2024-07-31
Payer: MEDICARE

## 2024-07-31 NOTE — TELEPHONE ENCOUNTER
----- Message from Susan Avila sent at 7/31/2024  8:54 AM CDT -----  Contact: Claude  .Patient is calling to speak with the nurse regarding questions and concerns . Reports needing to speak with some one . Please give patient a call back at   . [phone]

## 2024-07-31 NOTE — TELEPHONE ENCOUNTER
Spoke with pt and advised him that we haven't received a letter yet. Pt advised me that he will come and drop off the letter

## 2024-09-17 DIAGNOSIS — I48.20 ATRIAL FIBRILLATION, CHRONIC: ICD-10-CM

## 2024-09-17 RX ORDER — WARFARIN SODIUM 5 MG/1
TABLET ORAL
Qty: 90 TABLET | Refills: 0 | Status: SHIPPED | OUTPATIENT
Start: 2024-09-17

## 2024-09-17 NOTE — TELEPHONE ENCOUNTER
Refill Routing Note   Medication(s) are not appropriate for processing by [facility] for the following reason(s):        Outside of protocol    [facility] action(s):  Route               Appointments  past 12m or future 3m with PCP    Date Provider   Last Visit   4/22/2024 Hima Cannon MD   Next Visit   Visit date not found Hima Cannon MD   ED visits in past 90 days: 0        Note composed:10:23 AM 09/17/2024

## 2024-09-30 DIAGNOSIS — I25.10 CORONARY ARTERY DISEASE INVOLVING NATIVE CORONARY ARTERY OF NATIVE HEART WITHOUT ANGINA PECTORIS: ICD-10-CM

## 2024-09-30 DIAGNOSIS — I73.9 PAD (PERIPHERAL ARTERY DISEASE): Primary | ICD-10-CM

## 2024-09-30 DIAGNOSIS — M51.379 DDD (DEGENERATIVE DISC DISEASE), LUMBOSACRAL: ICD-10-CM

## 2024-10-07 ENCOUNTER — OFFICE VISIT (OUTPATIENT)
Dept: PRIMARY CARE CLINIC | Facility: CLINIC | Age: 89
End: 2024-10-07
Payer: MEDICARE

## 2024-10-07 VITALS
WEIGHT: 157.63 LBS | DIASTOLIC BLOOD PRESSURE: 68 MMHG | RESPIRATION RATE: 19 BRPM | SYSTOLIC BLOOD PRESSURE: 122 MMHG | OXYGEN SATURATION: 99 % | TEMPERATURE: 96 F | HEART RATE: 63 BPM | BODY MASS INDEX: 23.28 KG/M2

## 2024-10-07 DIAGNOSIS — M79.671 PAIN IN BOTH FEET: ICD-10-CM

## 2024-10-07 DIAGNOSIS — M54.50 ACUTE LEFT-SIDED LOW BACK PAIN WITHOUT SCIATICA: ICD-10-CM

## 2024-10-07 DIAGNOSIS — M79.672 PAIN IN BOTH FEET: ICD-10-CM

## 2024-10-07 DIAGNOSIS — M51.379 DDD (DEGENERATIVE DISC DISEASE), LUMBOSACRAL: ICD-10-CM

## 2024-10-07 DIAGNOSIS — M48.061 SPINAL STENOSIS OF LUMBAR REGION AT MULTIPLE LEVELS: ICD-10-CM

## 2024-10-07 DIAGNOSIS — I25.10 CORONARY ARTERY DISEASE INVOLVING NATIVE CORONARY ARTERY OF NATIVE HEART WITHOUT ANGINA PECTORIS: ICD-10-CM

## 2024-10-07 DIAGNOSIS — K40.90 RIGHT INGUINAL HERNIA: Primary | ICD-10-CM

## 2024-10-07 PROCEDURE — 1101F PT FALLS ASSESS-DOCD LE1/YR: CPT | Mod: CPTII,S$GLB,, | Performed by: FAMILY MEDICINE

## 2024-10-07 PROCEDURE — G2211 COMPLEX E/M VISIT ADD ON: HCPCS | Mod: S$GLB,,, | Performed by: FAMILY MEDICINE

## 2024-10-07 PROCEDURE — 1159F MED LIST DOCD IN RCRD: CPT | Mod: CPTII,S$GLB,, | Performed by: FAMILY MEDICINE

## 2024-10-07 PROCEDURE — 3288F FALL RISK ASSESSMENT DOCD: CPT | Mod: CPTII,S$GLB,, | Performed by: FAMILY MEDICINE

## 2024-10-07 PROCEDURE — 99215 OFFICE O/P EST HI 40 MIN: CPT | Mod: S$GLB,,, | Performed by: FAMILY MEDICINE

## 2024-10-07 PROCEDURE — 99999 PR PBB SHADOW E&M-EST. PATIENT-LVL IV: CPT | Mod: PBBFAC,,, | Performed by: FAMILY MEDICINE

## 2024-10-07 PROCEDURE — 1126F AMNT PAIN NOTED NONE PRSNT: CPT | Mod: CPTII,S$GLB,, | Performed by: FAMILY MEDICINE

## 2024-10-07 RX ORDER — CELECOXIB 50 MG/1
50 CAPSULE ORAL 2 TIMES DAILY
Qty: 180 CAPSULE | Refills: 3 | Status: SHIPPED | OUTPATIENT
Start: 2024-10-07

## 2024-10-07 RX ORDER — TRIAMCINOLONE ACETONIDE 0.25 MG/G
CREAM TOPICAL 2 TIMES DAILY
COMMUNITY
Start: 2024-09-26

## 2024-10-07 RX ORDER — HYDROCODONE BITARTRATE AND ACETAMINOPHEN 5; 325 MG/1; MG/1
1 TABLET ORAL EVERY 12 HOURS PRN
Qty: 10 TABLET | Refills: 0 | Status: SHIPPED | OUTPATIENT
Start: 2024-10-07

## 2024-10-07 RX ORDER — GABAPENTIN 100 MG/1
100 CAPSULE ORAL
COMMUNITY
Start: 2024-10-04

## 2024-10-07 NOTE — PROGRESS NOTES
"    [facility]       [address] [place] [address]      Phone: [phone]      Fax: [phone]    Hima Cannon MD                Office Visit  10/07/2024        Subjective      HPI:  Claude L Taylor is a 89 y.o. male presents today in clinic for "Follow-up (He is here for a follow up visit. States he has a hernia in his groin area and it is starting to bother him if sitting too long. Back of right shoulder has been causing pain, as well as right elbow. //Would like to discuss having spine stimulator removed. )  ."     89-year-old gentleman presents today to discuss multiple issues.      His wife, Ms. Quiroga, is present with him.  She provides some of the history.  Daughter, Rosa, also present.    States that his groin hernia still bothers him occasionally.  Mostly when sitting.  Feels a pressure in that area.  Has to constantly readjust.      Also, still having pains in his right shoulder/right upper arm.  Comes and goes.  Bothers him when he moves certain ways.  Now, getting pains in his left elbow, as well.      States that he gets short of breath even with minimal exertion.    He also states he still has a spine stimulator that was placed years ago.  Bothers him when he lays on his back.  Has to sleep on his right side otherwise it feels irritated.  He would like to have it surgically removed.    PMH:  CAD, AFib, PVD, BPH, OA/DDD/chronic pains.  PSH:  Pacemaker, heart ablation/heart valve repair/heart catheterization, CABG, tonsils, multiple MSK (back, foot), gallbladder  Allergies:  Oxycodone makes him very emotional  Social:  Retired.  .  T:  Denies  A:  Occasionally  D:  Denies    Exercise:  No regular exercise program.  Has rolling walker and scooter to help get around.    Cardiology:  Dr. Barrios  Ophthalmology:  Dr. Brown, Dr. Sampson  Dermatology: Dr. Morfin          The following were updated and reviewed by myself in the chart: medications, " past medical history, past surgical history, family history, social history, and allergies.     Medications:  Current Outpatient Medications on File Prior to Visit   Medication Sig Dispense Refill    furosemide (LASIX) 20 MG tablet Take 1 tablet by mouth as needed. 180 tablet 3    gabapentin (NEURONTIN) 100 MG capsule Take 100 mg by mouth.      QUEtiapine (SEROQUEL) 25 MG Tab Take 1 tablet (25 mg total) by mouth nightly as needed (sleep). 90 tablet 3    tamsulosin (FLOMAX) 0.4 mg Cap Take 2 capsules (0.8 mg total) by mouth every evening for prostate and urine flow 180 capsule 3    triamcinolone acetonide 0.025% (KENALOG) 0.025 % cream Apply topically 2 (two) times daily.      warfarin (COUMADIN) 5 MG tablet TAKE 1 TABLET BY MOUTH ONCE DAILY OR  AS  DIRECTED  BY  COUMADIN  CLINIC 90 tablet 0    fluorometholone 0.1% (FML) 0.1 % DrpS Place 1 drop in both eyes twice daily (Patient not taking: Reported on 10/7/2024) 5 mL 5    traMADoL (ULTRAM) 50 mg tablet Take 1 tablet (50 mg total) by mouth every 4 (four) hours as needed for Pain. (Patient not taking: Reported on 10/7/2024) 42 tablet 0    [DISCONTINUED] HYDROcodone-acetaminophen (NORCO) 5-325 mg per tablet Take 1 tablet by mouth every 12 (twelve) hours as needed for Pain. (Patient not taking: Reported on 10/7/2024) 10 tablet 0     No current facility-administered medications on file prior to visit.        PMHx:  Past Medical History:   Diagnosis Date    *Atrial fibrillation     pacer Dr Barrios    Anticoagulant long-term use     Cancer     skin    Cervical neck pain with evidence of disc disease     Colon polyps 12/14/2015    Coronary artery disease     Depression     Diverticulosis     Hypertension     Kidney stone     Skin cancer       Patient Active Problem List    Diagnosis Date Noted    Positive SHOAIB (antinuclear antibody) 05/02/2022    Keratoconjunctivitis sicca due to decreased tear production, bilateral 05/02/2022    Primary osteoarthritis involving multiple joints  05/02/2022    Dependence on other enabling machines and devices 11/09/2021    Atrial fibrillation, chronic 05/03/2021    Osteoporosis 05/03/2021    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    SOB (shortness of breath) on exertion 03/27/2019    Benign prostatic hyperplasia with weak urinary stream 03/27/2019    Cardiac pacemaker 07/31/2018    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    Nonrheumatic mitral (valve) insufficiency 07/31/2018    Peripheral vascular disease 07/31/2018    Hx of CABG 05/23/2018    Spinal stenosis of lumbar region at multiple levels 05/23/2018    DDD (degenerative disc disease), lumbosacral 05/23/2018    Proximal muscle weakness 05/23/2018    OAB (overactive bladder) 05/23/2018    Coronary artery disease involving native coronary artery of native heart without angina pectoris 10/05/2017    Permanent atrial fibrillation 10/05/2017    Anticoagulated on Coumadin 10/05/2017    B12 deficiency due to diet 10/05/2017    Primary osteoarthritis of both knees 07/14/2017    Chronic constipation 09/26/2016    Anismus 12/14/2015    Diverticulosis of large intestine without hemorrhage 12/14/2015    Altered bowel habits 12/13/2015    Chronic back pain 09/15/2015    Osteoarthritis 11/18/2014    Long term (current) use of anticoagulants 11/06/2014    Asthma in adult 01/14/2014    Depression         PSHx:  Past Surgical History:   Procedure Laterality Date    back fusion      CARDIAC PACEMAKER PLACEMENT      CARDIAC VALVE REPLACEMENT      CHOLECYSTECTOMY      COLONOSCOPY  03/22/2013    COLONOSCOPY N/A 12/14/2015    Procedure: COLONOSCOPY;  Surgeon: Gonzalo Gorman MD;  Location: [facility];  Service: Endoscopy;  Laterality: N/A;    CORONARY ARTERY BYPASS GRAFT      ELBOW BURSA SURGERY      left    EYE SURGERY      FOOT SURGERY      MITRAL VALVE REPLACEMENT      REPLACEMENT OF PACEMAKER  09/2019    SHOULDER SURGERY      SKIN CANCER EXCISION  01/2018/   04/2018 Dr.Thorla Jones    lower left leg     SPINE SURGERY  3  "surgeries    TONSILLECTOMY          FHx:  Family History   Problem Relation Name Age of Onset    Cancer Father Claude Lee Taylor     Arthritis Father Claude Lee Taylor     Colon cancer Neg Hx          Social:  Social History     Socioeconomic History    Marital status:      Spouse name: marguerite Barron   Tobacco Use    Smoking status: Never    Smokeless tobacco: Never    Tobacco comments:     never a smoker   Substance and Sexual Activity    Alcohol use: Not Currently     Alcohol/week: 2.0 standard drinks of alcohol     Types: 1 Glasses of wine, 1 Cans of beer per week     Comment: drinks above only with meals ocassionaly    Drug use: Never    Sexual activity: Not Currently     Partners: Female     Comment: too old at 89        Allergies:  Review of patient's allergies indicates:   Allergen Reactions    Oxycodone      "it's mental/emotional        ROS:  Review of Systems   Constitutional:  Positive for activity change. Negative for appetite change, chills and fever.   HENT:  Negative for congestion, postnasal drip, rhinorrhea, sore throat and trouble swallowing.    Respiratory:  Positive for shortness of breath. Negative for cough.    Cardiovascular:  Negative for chest pain and palpitations.   Gastrointestinal:  Negative for abdominal pain, constipation, diarrhea, nausea and vomiting.   Genitourinary:  Negative for difficulty urinating.   Musculoskeletal:  Positive for arthralgias and myalgias.   Skin:  Negative for color change and rash.   Neurological:  Negative for headaches.   All other systems reviewed and are negative.         Objective      /68 (BP Location: Right arm, Patient Position: Sitting)   Pulse 63   Temp 96.1 °F (35.6 °C) (Tympanic)   Resp 19   Wt 71.5 kg (157 lb 10.1 oz)   SpO2 99%   BMI 23.28 kg/m²   Ht Readings from Last 3 Encounters:   04/22/24 5' 9" (1.753 m)   03/18/24 5' 9" (1.753 m)   03/07/24 5' 9" (1.753 m)     Wt Readings from Last 3 Encounters:   10/07/24 71.5 kg (157 lb " 10.1 oz)   04/22/24 70.9 kg (156 lb 4.9 oz)   03/18/24 70 kg (154 lb 5.2 oz)       PHYSICAL EXAM:  Physical Exam  Vitals and nursing note reviewed.   Constitutional:       General: He is not in acute distress.     Appearance: Normal appearance.   HENT:      Head: Normocephalic and atraumatic.      Right Ear: Tympanic membrane, ear canal and external ear normal.      Left Ear: Tympanic membrane, ear canal and external ear normal.      Nose: Nose normal. No congestion or rhinorrhea.      Mouth/Throat:      Mouth: Mucous membranes are moist.      Pharynx: Oropharynx is clear. No oropharyngeal exudate or posterior oropharyngeal erythema.   Eyes:      Extraocular Movements: Extraocular movements intact.      Conjunctiva/sclera: Conjunctivae normal.      Pupils: Pupils are equal, round, and reactive to light.   Cardiovascular:      Rate and Rhythm: Normal rate and regular rhythm.   Pulmonary:      Effort: Pulmonary effort is normal.      Breath sounds: No wheezing, rhonchi or rales.   Musculoskeletal:         General: Normal range of motion.      Cervical back: Normal range of motion.   Lymphadenopathy:      Cervical: No cervical adenopathy.   Skin:     General: Skin is warm and dry.   Neurological:      General: No focal deficit present.      Mental Status: He is alert.              LABS / IMAGING:  Recent Results (from the past 26 weeks)   Protime-INR, fingerstick, standing order    Collection Time: 07/01/24 10:12 AM   Result Value Ref Range    INR 2.9 (H) 0.89 - 1.26    PT 34.9 (H) 11.5 - 15.3 sec   Protime-INR, fingerstick, standing order    Collection Time: 09/13/24 11:12 AM   Result Value Ref Range    INR 2.5 (H) 0.89 - 1.26    PT 30.6 (H) 11.5 - 15.3 sec         Assessment    1. Right inguinal hernia    2. Acute left-sided low back pain without sciatica    3. DDD (degenerative disc disease), lumbosacral    4. Spinal stenosis of lumbar region at multiple levels    5. Pain in both feet    6. Coronary artery disease  involving native coronary artery of native heart without angina pectoris          Plan Claude was seen today for follow-up.    Diagnoses and all orders for this visit:    Right inguinal hernia    Acute left-sided low back pain without sciatica  -     HYDROcodone-acetaminophen (NORCO) 5-325 mg per tablet; Take 1 tablet by mouth every 12 (twelve) hours as needed for Pain.  -     celecoxib (CELEBREX) 50 MG capsule; Take 1 capsule (50 mg total) by mouth 2 (two) times daily.    DDD (degenerative disc disease), lumbosacral  -     HYDROcodone-acetaminophen (NORCO) 5-325 mg per tablet; Take 1 tablet by mouth every 12 (twelve) hours as needed for Pain.  -     celecoxib (CELEBREX) 50 MG capsule; Take 1 capsule (50 mg total) by mouth 2 (two) times daily.    Spinal stenosis of lumbar region at multiple levels  -     HYDROcodone-acetaminophen (NORCO) 5-325 mg per tablet; Take 1 tablet by mouth every 12 (twelve) hours as needed for Pain.  -     celecoxib (CELEBREX) 50 MG capsule; Take 1 capsule (50 mg total) by mouth 2 (two) times daily.    Pain in both feet  -     HYDROcodone-acetaminophen (NORCO) 5-325 mg per tablet; Take 1 tablet by mouth every 12 (twelve) hours as needed for Pain.  -     celecoxib (CELEBREX) 50 MG capsule; Take 1 capsule (50 mg total) by mouth 2 (two) times daily.    Coronary artery disease involving native coronary artery of native heart without angina pectoris      Physically, everything looks good.      Not likely they but he would be willing to do general anesthesia to remove the spinal stimulator.  We will see Dr. MARCIAL tomorrow.  Can ask him if that is something he can do under local anesthesia.    In the meantime, can use OTC Tylenol as needed for aches, pains.  Can supplement with Celebrex.  He has some tramadol at home that he can also try.  If the pain is unbearable, he can use a half a tablet of Norco.    Separately, they recently sold their car and stopped driving.  Spoke with care at home nurse  practitioner, Kelsey.  She will put them on her schedule and come out to see them monthly.    FOLLOW-UP:  Follow up if symptoms worsen or fail to improve.    I spent a total of 40 minutes face to face and non-face to face on the date of this visit.This includes time preparing to see the patient (eg, review of tests, notes), obtaining and/or reviewing additional history from an independent historian and/or outside medical records, documenting clinical information in the electronic health record, independently interpreting results and/or communicating results to the patient/family/caregiver, or care coordinator.  Visit today included increased complexity associated with the care of the episodic problem addressed and managing the longitudinal care of the patient due to the serious and/or complex managed problem(s).    Signed by:  Hima Cannon MD

## 2024-10-07 NOTE — PATIENT INSTRUCTIONS
Physically, everything looks pretty good today.      For your pains, we can try similar treatment as Ms. Quiroga.      Try using OTC Tylenol.  You can take two 500 mg tablets every 8 hours.  You can supplement this with prescription Celebrex.  You can take this once every 12 hours.    If no relief with these, you can try tramadol, or Norco (hydrocodone).  Just take 1/2 tablet, as needed.  If no relief after 30-60 minutes, take the other 1/2 tablet.    I will ask Dr. Rosa about the spinal stimulator and best options for getting it removed.  We will call you and let you know what he says.    Continue to eat a healthy diet.  Be careful with portion sizes.  Includes lots of fresh fruits, vegetables, whole grains, lean proteins.      Keep hydrated.  Be sure to drink at least 8-10, 8 oz, glasses of water every day.    Stay active.  Try to do some sort of physical activity every day.

## 2024-10-11 ENCOUNTER — TELEPHONE (OUTPATIENT)
Dept: PRIMARY CARE CLINIC | Facility: CLINIC | Age: 89
End: 2024-10-11
Payer: MEDICARE

## 2024-10-11 DIAGNOSIS — Z96.89 SPINAL CORD STIMULATOR STATUS: Primary | ICD-10-CM

## 2024-10-11 NOTE — TELEPHONE ENCOUNTER
----- Message from Rosa sent at 10/11/2024  1:19 PM CDT -----  Type:  Patient Returning Call    Who Called:Claude  Who Left Message for Patient:  Does the patient know what this is regarding?:Surgery on back  Would the patient rather a call back or a response via [facility]? Callback  Best Call Back Number:[phone]  Additional Information: Was waiting for a callback after speaking with surgeon about back. Please callback

## 2024-10-11 NOTE — TELEPHONE ENCOUNTER
Patient is waiting for a return call about removing a stimulator from his back. Stated Dr. Cannon was talking to Dr. Rosa about it.

## 2024-10-14 NOTE — TELEPHONE ENCOUNTER
After speaking with patient and looking in his chart he has never seen Dr. PERSAUD  A referral will be needed. I will call schedule him afterwards.

## 2024-10-15 ENCOUNTER — CARE AT HOME (OUTPATIENT)
Dept: HOME HEALTH SERVICES | Facility: CLINIC | Age: 89
End: 2024-10-15
Payer: MEDICARE

## 2024-10-15 VITALS
HEART RATE: 60 BPM | SYSTOLIC BLOOD PRESSURE: 130 MMHG | RESPIRATION RATE: 18 BRPM | OXYGEN SATURATION: 93 % | TEMPERATURE: 99 F | DIASTOLIC BLOOD PRESSURE: 68 MMHG

## 2024-10-15 DIAGNOSIS — M54.9 CHRONIC MIDLINE BACK PAIN, UNSPECIFIED BACK LOCATION: ICD-10-CM

## 2024-10-15 DIAGNOSIS — G89.29 CHRONIC MIDLINE BACK PAIN, UNSPECIFIED BACK LOCATION: ICD-10-CM

## 2024-10-15 DIAGNOSIS — J45.909 ASTHMA IN ADULT WITHOUT COMPLICATION, UNSPECIFIED ASTHMA SEVERITY, UNSPECIFIED WHETHER PERSISTENT: Primary | ICD-10-CM

## 2024-10-15 PROCEDURE — 1159F MED LIST DOCD IN RCRD: CPT | Mod: CPTII,S$GLB,, | Performed by: NURSE PRACTITIONER

## 2024-10-15 PROCEDURE — 99350 HOME/RES VST EST HIGH MDM 60: CPT | Mod: S$GLB,,, | Performed by: NURSE PRACTITIONER

## 2024-10-15 PROCEDURE — 1160F RVW MEDS BY RX/DR IN RCRD: CPT | Mod: CPTII,S$GLB,, | Performed by: NURSE PRACTITIONER

## 2024-10-15 NOTE — ASSESSMENT & PLAN NOTE
Patient still complains of shortness of breath with exertion.   O2 spot check at rest was 93% RA   Continue albuterol rescue inhaler as prescribed for shortness of breath

## 2024-10-15 NOTE — ASSESSMENT & PLAN NOTE
Patient reports back pain at night where his nerve stimulator is in his back  Continue hydrocodone for pain as prescribed   Follow up with pain management as instructed

## 2024-10-15 NOTE — TELEPHONE ENCOUNTER
Spoke with pt and advised him that someone from Dr. Murphy office will get in contact with him to get him scheduled. Pt verbalized understanding

## 2024-10-15 NOTE — PROGRESS NOTES
[facility]  Medical Chronic Care Home Visit    Encounter Provider: Nancy Finn   PCP: Hima Cannon MD  Consult Requested By: Dr. Hima Cannon    HISTORY OF PRESENT ILLNESS      Patient ID: Claude L Taylor is a 89 y.o. male is being seen in the home due to physical debility that presents a taxing effort to leave the home, to mitigate high risk of hospital readmission and/or due to the limited availability of reliable or safe options for transportation to the point of access to the provider. Prior to treatment on this visit the chart was reviewed and patient verbal consent was obtained.    Chronic medical conditions synopsis:    1) Patient is a 89 year old male with diagnoses of spinal stenosis, asthma, mitral valve insufficiency, Hx  valve replacement and CABG, CAD, Pacemaker present, complete AV Block, A-Fib, BPH, chronic back pain. Patient's only complaint of today is shortness of breath with exertion and back pain at night from implanted nerve stimulator in his back. He has an appointment with pain management to see if it was possible to have the nerve stimulator removed without putting him under general anesthesia.  2) Recent developments: no new issues  3) Reason for consult and visit: establish care at home    DECISION MAKING TODAY       Assessment & Plan:  1. Asthma in adult without complication, unspecified asthma severity, unspecified whether persistent  Assessment & Plan:  Patient still complains of shortness of breath with exertion.   O2 spot check at rest was 93% RA   Continue albuterol rescue inhaler as prescribed for shortness of breath      2. Chronic midline back pain, unspecified back location  Assessment & Plan:  Patient reports back pain at night where his nerve stimulator is in his back  Continue hydrocodone for pain as prescribed   Follow up with pain management as instructed              ENVIRONMENT OF CARE      Family and/or Caregiver present at visit?  Yes  Name of  Caregiver: Kimber Barron (wife)  History provided by: patient    4Ms for Medical Decision-Making in Older Adults    Last Completed EAWV: 2021    MOBILITY:  Get Up and Go:      2021    11:43 AM   Get Up and Go   Trial 1 --     Activities of Daily Livin/9/2021    11:45 AM   Activities of Daily Living   Ambulation Assistance Required   Ambulation Assistance Walker (standard)   Dressing Independent   Transfers Independent   Toileting Continent of bladder;Continent of bowel   Feeding Independent   Cleaning home/Chores Independent   Telephone use Independent   Shopping Independent   Paying bills Independent   Taking meds Independent     Whisper Test:      2021    11:43 AM   Whisper Test   Whisper Test N/A- Hearing Impairment     Disability Status:      2021    11:47 AM   Disability Status   Are you deaf or do you have serious difficulty hearing? N   Are you blind or do you have serious difficulty seeing, even when wearing glasses? N   Because of a physical, mental, or emotional condition, do you have serious difficulty concentrating, remembering, or making decisions? N   Do you have serious difficulty walking or climbing stairs? N   Do you have difficulty dressing or bathing? N   Because of a physical, mental, or emotional condition, do you have difficulty doing errands alone such as visiting a doctor's office or shopping? N     Nutrition Screenin/9/2021    11:45 AM   Nutrition Screening   Has food intake declined over the past three months due to loss of appetite, digestive problems, chewing or swallowing difficulties? No decrease in food intake    Involuntary weight loss during the last 3 months? No weight loss   Mobility? Goes out   Has the patient suffered psychological stress or acute disease in the past three months? No   Neuropsychological problems? No psychological problems   Body Mass Index (BMI)?  BMI 23 or greater   Screening Score 14       Significant value    Screening  Score: 0-7 Malnourished, 8-11 At Risk, 12-14 Normal  Fall Risk:      10/7/2024    10:00 AM 2024     1:00 PM 3/18/2024    10:40 AM   Fall Risk Assessment - Outpatient   Mobility Status Ambulatory w/ assistance Ambulatory w/ assistance Ambulatory   Number of falls 0 0 0   Identified as fall risk True True False           MENTATION:   Depression Patient Health Questionnaire:      3/18/2024    10:27 AM   Depression Patient Health Questionnaire   Over the last two weeks how often have you been bothered by little interest or pleasure in doing things Not at all   Over the last two weeks how often have you been bothered by feeling down, depressed or hopeless Not at all   PHQ-2 Total Score 0     Has Dementia Dx: No  Has Anxiety Dx: No    Cognitive Function Screenin/9/2021    11:48 AM   Cognitive Function Screening   Clock Drawing Test 2   Mini-Cog 3 Minute Recall 3   Cognitive Function Screening 5     Cognitive Function Screening Total - Less than 4 = Abnormal,  Greater than or equal to 4 = Normal        MEDICATIONS:  High Risk Medications:  Total Active Medications: 4  gabapentin - 100 MG  HYDROcodone-acetaminophen - 5-325 mg  QUEtiapine Tab - 25 MG  traMADoL - 50 mg    WHAT MATTERS MOST:  Advance Care Planning   ACP Status:   Patient has had an ACP conversation  Living Will: No  Power of : No  [place]: No    What is most important right now is to focus on spending time at home, avoiding the hospital, and remaining as independent as possible    Accordingly, we have decided that the best plan to meet the patient's goals includes continuing with treatment      What matters most to patient today is: staying well at home           Is patient hospice appropriate: No  (If needed, use PPS <30 or FAST score >7)  Was referral to hospice placed: No    Impression upon entering the home:  Physical Dwelling: trailer   Appearance of home environment: cleaniness: clean, walking pathways: clear, lighting: adequate,  and home structure: sound structure  Functional Status: minimal assistance  Mobility: ambulatory with device  Nutritional access: adequate intake and access  Home Health: No, and does not need it at this time   DME/Supplies: rolling walker     Diagnostic tests reviewed/disposition: No diagnosic tests pending after this hospitalization.  Disease/illness education:  Asthma  Establishment or re-establishment of referral orders for community resources: No other necessary community resources.   Discussion with other health care providers: No discussion with other health care providers necessary.   Does patient have a PCP at [facility]? Yes   Repatriation plan with PCP? follow-up with PCP within 90d   Does patient have an ostomy (ileostomy, colostomy, suprapubic catheter, nephrostomy tube, tracheostomy, PEG tube, pleurex catheter, cholecystostomy, etc)? No  Were BPAs reviewed? Yes    Social History     Socioeconomic History    Marital status:      Spouse name: marguerite Barron   Tobacco Use    Smoking status: Never    Smokeless tobacco: Never    Tobacco comments:     never a smoker   Substance and Sexual Activity    Alcohol use: Not Currently     Alcohol/week: 2.0 standard drinks of alcohol     Types: 1 Glasses of wine, 1 Cans of beer per week     Comment: drinks above only with meals ocassionaly    Drug use: Never    Sexual activity: Not Currently     Partners: Female     Comment: too old at 89         OBJECTIVE:     Vital Signs:  Vitals:    10/15/24 1148   BP: 130/68   Pulse: 60   Resp: 18   Temp: 98.6 °F (37 °C)       Review of Systems   Constitutional: Negative.    HENT: Negative.     Eyes: Negative.    Respiratory:  Positive for shortness of breath (with exertion).    Cardiovascular:  Positive for leg swelling.   Gastrointestinal: Negative.    Endocrine: Negative.    Genitourinary: Negative.    Musculoskeletal:  Positive for arthralgias and back pain.   Skin: Negative.        Physical Exam:  Physical Exam  Vitals  reviewed.   Constitutional:       General: He is not in acute distress.  HENT:      Head: Normocephalic and atraumatic.      Nose: Nose normal.      Mouth/Throat:      Mouth: Mucous membranes are moist.      Pharynx: Oropharynx is clear.   Cardiovascular:      Rate and Rhythm: Normal rate and regular rhythm.      Heart sounds: Normal heart sounds.   Pulmonary:      Breath sounds: Decreased air movement present.   Musculoskeletal:      Cervical back: Neck supple.      Right lower leg: Edema (1+ edema bilateral) present.      Left lower leg: Edema present.   Skin:     General: Skin is warm and dry.   Neurological:      Mental Status: He is alert and oriented to person, place, and time.   Psychiatric:         Mood and Affect: Mood normal.         Behavior: Behavior normal.         Thought Content: Thought content normal.         Judgment: Judgment normal.         INSTRUCTIONS FOR PATIENT:     Scheduled Follow-up, Appts Reviewed with Modifications if Needed: Yes  Future Appointments   Date Time Provider Department Center   11/19/2024  3:30 PM Moises Rosa MD [facility]   11/26/2024  8:00 AM Nancy Berrios NP [address]   1/28/2025 11:00 AM Hima Cannon MD [facility] [place] [facility]         Current Outpatient Medications:     celecoxib (CELEBREX) 50 MG capsule, Take 1 capsule (50 mg total) by mouth 2 (two) times daily., Disp: 180 capsule, Rfl: 3    fluorometholone 0.1% (FML) 0.1 % DrpS, Place 1 drop in both eyes twice daily (Patient not taking: Reported on 10/7/2024), Disp: 5 mL, Rfl: 5    furosemide (LASIX) 20 MG tablet, Take 1 tablet by mouth as needed., Disp: 180 tablet, Rfl: 3    gabapentin (NEURONTIN) 100 MG capsule, Take 100 mg by mouth., Disp: , Rfl:     HYDROcodone-acetaminophen (NORCO) 5-325 mg per tablet, Take 1 tablet by mouth every 12 (twelve) hours as needed for Pain., Disp: 10 tablet, Rfl: 0    QUEtiapine (SEROQUEL) 25 MG Tab, Take 1 tablet (25 mg total) by mouth nightly as  needed (sleep)., Disp: 90 tablet, Rfl: 3    tamsulosin (FLOMAX) 0.4 mg Cap, Take 2 capsules (0.8 mg total) by mouth every evening for prostate and urine flow, Disp: 180 capsule, Rfl: 3    traMADoL (ULTRAM) 50 mg tablet, Take 1 tablet (50 mg total) by mouth every 4 (four) hours as needed for Pain. (Patient not taking: Reported on 10/7/2024), Disp: 42 tablet, Rfl: 0    triamcinolone acetonide 0.025% (KENALOG) 0.025 % cream, Apply topically 2 (two) times daily., Disp: , Rfl:     warfarin (COUMADIN) 5 MG tablet, TAKE 1 TABLET BY MOUTH ONCE DAILY OR  AS  DIRECTED  BY  [facility]MADIN  [facility], Disp: 90 tablet, Rfl: 0    Medication Reconciliation:  Were medications changed during this appointment? No  Were medications in the home? Yes  Is the patient taking the medications as directed? Yes  Does the patient/caregiver understand the medications and changes? Yes  Does updated med list accurately reflects meds patient is currently taking? Yes    Encounter for Medical Follow-Up and Medication Review  - [facility] to schedule follow-up visit with patient in 4 weeks or PRN     Patient Instructions Given:  - Continue all medications, treatments and therapies as ordered.   - Follow all instructions, recommendations as discussed.  - Maintain Safety Precautions at all times.  - Attend all medical appointments as scheduled.  - For worsening symptoms: call Primary Care Physician or Nurse Practitioner.  - For emergencies, call 911 or immediately report to the nearest emergency room          Signature: Nancy Finn NP

## 2024-11-19 ENCOUNTER — OFFICE VISIT (OUTPATIENT)
Dept: PAIN MEDICINE | Facility: CLINIC | Age: 89
End: 2024-11-19
Payer: MEDICARE

## 2024-11-19 VITALS
HEIGHT: 69 IN | DIASTOLIC BLOOD PRESSURE: 67 MMHG | RESPIRATION RATE: 16 BRPM | BODY MASS INDEX: 23.74 KG/M2 | SYSTOLIC BLOOD PRESSURE: 135 MMHG | WEIGHT: 160.25 LBS | HEART RATE: 63 BPM

## 2024-11-19 DIAGNOSIS — Z96.89 SPINAL CORD STIMULATOR STATUS: ICD-10-CM

## 2024-11-19 DIAGNOSIS — M96.1 LUMBAR POSTLAMINECTOMY SYNDROME: Primary | ICD-10-CM

## 2024-11-19 PROCEDURE — 99999 PR PBB SHADOW E&M-EST. PATIENT-LVL V: CPT | Mod: PBBFAC,,, | Performed by: ANESTHESIOLOGY

## 2024-11-19 PROCEDURE — 3288F FALL RISK ASSESSMENT DOCD: CPT | Mod: CPTII,S$GLB,, | Performed by: ANESTHESIOLOGY

## 2024-11-19 PROCEDURE — 99203 OFFICE O/P NEW LOW 30 MIN: CPT | Mod: S$GLB,,, | Performed by: ANESTHESIOLOGY

## 2024-11-19 PROCEDURE — 1159F MED LIST DOCD IN RCRD: CPT | Mod: CPTII,S$GLB,, | Performed by: ANESTHESIOLOGY

## 2024-11-19 PROCEDURE — 1101F PT FALLS ASSESS-DOCD LE1/YR: CPT | Mod: CPTII,S$GLB,, | Performed by: ANESTHESIOLOGY

## 2024-11-19 PROCEDURE — 1125F AMNT PAIN NOTED PAIN PRSNT: CPT | Mod: CPTII,S$GLB,, | Performed by: ANESTHESIOLOGY

## 2024-11-19 RX ORDER — HYDROCODONE BITARTRATE AND ACETAMINOPHEN 7.5; 325 MG/1; MG/1
1 TABLET ORAL EVERY 8 HOURS PRN
Qty: 21 TABLET | Refills: 0 | Status: SHIPPED | OUTPATIENT
Start: 2024-11-19

## 2024-11-19 RX ORDER — SULFAMETHOXAZOLE AND TRIMETHOPRIM 800; 160 MG/1; MG/1
1 TABLET ORAL DAILY
Qty: 5 TABLET | Refills: 0 | Status: SHIPPED | OUTPATIENT
Start: 2024-11-19

## 2024-11-19 RX ORDER — CHLORHEXIDINE GLUCONATE 40 MG/ML
SOLUTION TOPICAL DAILY
Qty: 118 ML | Refills: 0 | Status: SHIPPED | OUTPATIENT
Start: 2024-11-19

## 2024-11-19 NOTE — PROGRESS NOTES
New Patient Interventional Pain Note (Initial Visit)    Referring Physician: Hima Cannon MD    PCP: Hima Cannon MD    Chief Complaint:     Chief Complaint   Patient presents with    Low-back Pain     Patient having issues with SCS that was put in in 2010, Dr. Villa.  Pain level 2/10        SUBJECTIVE:    Claude L Taylor is a 89 y.o. male who presents to the clinic for the evaluation of lower back pain.   Patient reports that over the last 1 year he has noted significant pain over the IPG site of the spinal cord stimulator.  Patient denies any erythema or infection.  Patient denies any dehiscence of the incision.  Patient reports that spine is only painful when he leans against a chair or other objects with a back and presses against the IPG.  IPG was placed in 2010 and patient reports that the machine has not been operable since 2018.  Pain is currently rated a 3 out 10, but can increase to a 7/10 when he leans against the device.    Patient denies any fevers, chills, changes in gait, saddle anesthesia, or bowel and bladder incontinence.      Non-Pharmacologic Treatments:  Physical Therapy/Home Exercise: yes  Ice/Heat:yes  TENS: no  Acupuncture: no  Massage: yes  Chiropractic: no        Previous Pain Medications:  NSAIDs, Tylenol, muscle relaxers, neuropathics, opioids, topicals       report:  Reviewed and consistent with medication use as prescribed.    Pain Procedures:   none          Imaging:     XR CHEST PA AND LATERAL 12/06/2022     CLINICAL HISTORY:  Acute upper respiratory infection, unspecified     FINDINGS:  Comparison is made to August 1, 2022.     Left-sided cardiac conduction device. Median sternotomy wires.  Spinal stimulator leads.  Right shoulder rotator cuff repair.  Cholecystectomy clips.     Mediastinal silhouette is within normal limits.  Mild increased parenchymal opacity bilaterally.  No pneumothorax or pleural effusion.     No acute osseous finding.  Potential lytic  lesion at the inferior aspect of the posterior right 8th rib.     Impression:     1. Potential lytic lesion at the inferior aspect of the posterior right 8th rib.  Consider chest CT.  2. Mild increased parenchymal opacity bilaterally may reflect pulmonary edema or an atypical infectious/inflammatory process.    Past Medical History:   Diagnosis Date    *Atrial fibrillation     pacer Dr Barrios    Anticoagulant long-term use     Cancer     skin    Cervical neck pain with evidence of disc disease     Colon polyps 12/14/2015    Coronary artery disease     Depression     Diverticulosis     Hypertension     Kidney stone     Skin cancer      Past Surgical History:   Procedure Laterality Date    back fusion      CARDIAC PACEMAKER PLACEMENT      CARDIAC VALVE REPLACEMENT      CHOLECYSTECTOMY      COLONOSCOPY  03/22/2013    COLONOSCOPY N/A 12/14/2015    Procedure: COLONOSCOPY;  Surgeon: Gonzalo Gorman MD;  Location: [facility];  Service: Endoscopy;  Laterality: N/A;    CORONARY ARTERY BYPASS GRAFT      ELBOW BURSA SURGERY      left    EYE SURGERY      FOOT SURGERY      MITRAL VALVE REPLACEMENT      REPLACEMENT OF PACEMAKER  09/2019    SHOULDER SURGERY      SKIN CANCER EXCISION  01/2018/   04/2018 Dr.Thorla Jones    lower left leg     SPINE SURGERY  3 surgeries    TONSILLECTOMY       Social History     Socioeconomic History    Marital status:      Spouse name: marguerite Barron   Tobacco Use    Smoking status: Never    Smokeless tobacco: Never    Tobacco comments:     never a smoker   Substance and Sexual Activity    Alcohol use: Not Currently     Alcohol/week: 2.0 standard drinks of alcohol     Types: 1 Glasses of wine, 1 Cans of beer per week     Comment: drinks above only with meals ocassionaly    Drug use: Never    Sexual activity: Not Currently     Partners: Female     Comment: too old at 89     Social Drivers of Health     Financial Resource Strain: Low Risk  (11/12/2024)    Overall Financial Resource Strain (CARDIA)     " Difficulty of Paying Living Expenses: Not hard at all   Food Insecurity: No Food Insecurity (11/12/2024)    Hunger Vital Sign     Worried About Running Out of Food in the Last Year: Never true     Ran Out of Food in the Last Year: Never true   Physical Activity: Insufficiently Active (11/12/2024)    Exercise Vital Sign     Days of Exercise per Week: 3 days     Minutes of Exercise per Session: 10 min   Housing Stability: Unknown (11/12/2024)    Housing Stability Vital Sign     Unable to Pay for Housing in the Last Year: No     Family History   Problem Relation Name Age of Onset    Cancer Father Claude Lee Taylor     Arthritis Father Claude Lee Taylor     Colon cancer Neg Hx         Review of patient's allergies indicates:   Allergen Reactions    Oxycodone      "it's mental/emotional       Current Outpatient Medications   Medication Sig    celecoxib (CELEBREX) 50 MG capsule Take 1 capsule (50 mg total) by mouth 2 (two) times daily.    chlorhexidine (HIBICLENS) 4 % external liquid Apply topically once daily. Please wash lower back with soap night before procedure    fluorometholone 0.1% (FML) 0.1 % DrpS Place 1 drop in both eyes twice daily (Patient not taking: Reported on 10/7/2024)    furosemide (LASIX) 20 MG tablet Take 1 tablet by mouth as needed.    gabapentin (NEURONTIN) 100 MG capsule Take 100 mg by mouth.    HYDROcodone-acetaminophen (NORCO) 7.5-325 mg per tablet Take 1 tablet by mouth every 8 (eight) hours as needed for Pain.    QUEtiapine (SEROQUEL) 25 MG Tab Take 1 tablet (25 mg total) by mouth nightly as needed (sleep).    sulfamethoxazole-trimethoprim 800-160mg (BACTRIM DS) 800-160 mg Tab Take 1 tablet by mouth once daily. Please start antibiotic day after procedure.  Take 1 pill daily with meals    tamsulosin (FLOMAX) 0.4 mg Cap Take 2 capsules (0.8 mg total) by mouth every evening for prostate and urine flow    traMADoL (ULTRAM) 50 mg tablet Take 1 tablet (50 mg total) by mouth every 4 (four) hours as " "needed for Pain. (Patient not taking: Reported on 10/7/2024)    triamcinolone acetonide 0.025% (KENALOG) 0.025 % cream Apply topically 2 (two) times daily.    warfarin (COUMADIN) 5 MG tablet TAKE 1 TABLET BY MOUTH ONCE DAILY OR  AS  DIRECTED  BY  COUMADIN  CLINIC     No current facility-administered medications for this visit.         ROS  Review of Systems   Constitutional:  Negative for activity change, appetite change and fever.   HENT:  Negative for sore throat.    Respiratory:  Negative for cough, chest tightness, shortness of breath, wheezing and stridor.    Cardiovascular:  Negative for chest pain, palpitations and leg swelling.   Gastrointestinal:  Positive for constipation. Negative for abdominal pain, blood in stool, diarrhea, nausea and vomiting.   Endocrine: Negative for polydipsia, polyphagia and polyuria.   Genitourinary:  Positive for urgency. Negative for dysuria and hematuria.   Musculoskeletal:  Positive for arthralgias, back pain, gait problem, joint swelling and myalgias. Negative for neck pain and neck stiffness.   Skin:  Negative for rash.   Allergic/Immunologic: Negative for food allergies.   Neurological:  Positive for weakness, numbness and headaches. Negative for dizziness, tremors, seizures, syncope, facial asymmetry, speech difficulty and light-headedness.   Psychiatric/Behavioral:  Negative for agitation, hallucinations, self-injury and suicidal ideas. The patient is not nervous/anxious and is not hyperactive.             OBJECTIVE:  /67   Pulse 63   Resp 16   Ht 5' 9" (1.753 m)   Wt 72.7 kg (160 lb 4.4 oz)   BMI 23.67 kg/m²         Physical Exam  Constitutional:       General: He is not in acute distress.     Appearance: Normal appearance. He is not ill-appearing.   HENT:      Head: Normocephalic and atraumatic.      Nose: No congestion or rhinorrhea.   Eyes:      Extraocular Movements: Extraocular movements intact.      Pupils: Pupils are equal, round, and reactive to light. "   Cardiovascular:      Pulses: Normal pulses.   Pulmonary:      Effort: Pulmonary effort is normal.   Skin:     General: Skin is warm and dry.      Capillary Refill: Capillary refill takes less than 2 seconds.   Neurological:      General: No focal deficit present.      Mental Status: He is alert and oriented to person, place, and time.      Sensory: No sensory deficit.   Psychiatric:         Mood and Affect: Mood normal.         Behavior: Behavior normal.         Thought Content: Thought content normal.           Musculoskeletal:      Lumbar Exam  Incision: no  Pain with Flexion: no  Pain with Extension: yes  ROM:  Decreased  Paraspinous TTP:  Positive bilaterally    IPG incision is clean dry and intact with no signs purulence or drainage.  That has mild tenderness to palpation over the IPG site.    LABS:  Lab Results   Component Value Date    WBC 6.50 03/30/2023    HGB 12.8 (L) 03/30/2023    HCT 39.2 (L) 03/30/2023    MCV 93 03/30/2023     03/30/2023       CMP  Sodium   Date Value Ref Range Status   03/30/2023 138 136 - 145 mmol/L Final     Potassium   Date Value Ref Range Status   03/30/2023 5.1 3.5 - 5.1 mmol/L Final     Chloride   Date Value Ref Range Status   03/30/2023 107 95 - 110 mmol/L Final     CO2   Date Value Ref Range Status   03/30/2023 25 23 - 29 mmol/L Final     Glucose   Date Value Ref Range Status   03/30/2023 94 70 - 110 mg/dL Final     BUN   Date Value Ref Range Status   03/30/2023 9 (L) 10 - 30 mg/dL Final     Creatinine   Date Value Ref Range Status   03/30/2023 0.8 0.5 - 1.4 mg/dL Final     Calcium   Date Value Ref Range Status   03/30/2023 8.9 8.7 - 10.5 mg/dL Final     Total Protein   Date Value Ref Range Status   03/30/2023 6.6 6.0 - 8.4 g/dL Final     Albumin   Date Value Ref Range Status   03/30/2023 3.7 3.5 - 5.2 g/dL Final     Total Bilirubin   Date Value Ref Range Status   03/30/2023 1.4 (H) 0.1 - 1.0 mg/dL Final     Comment:     For infants and newborns, interpretation of  "results should be based  on gestational age, weight and in agreement with clinical  observations.    Premature Infant recommended reference ranges:  Up to 24 hours.............<8.0 mg/dL  Up to 48 hours............<12.0 mg/dL  3-5 days..................<15.0 mg/dL  6-29 days.................<15.0 mg/dL       Alkaline Phosphatase   Date Value Ref Range Status   03/30/2023 83 55 - 135 U/L Final     AST   Date Value Ref Range Status   03/30/2023 20 10 - 40 U/L Final     ALT   Date Value Ref Range Status   03/30/2023 9 (L) 10 - 44 U/L Final     Anion Gap   Date Value Ref Range Status   03/30/2023 6 (L) 8 - 16 mmol/L Final     eGFR if    Date Value Ref Range Status   02/08/2022 >60.0 >60 mL/min/1.73 m^2 Final     eGFR if non    Date Value Ref Range Status   02/08/2022 >60.0 >60 mL/min/1.73 m^2 Final     Comment:     Calculation used to obtain the estimated glomerular filtration  rate (eGFR) is the CKD-EPI equation.          No results found for: "LABA1C", "HGBA1C"          ASSESSMENT:       89 y.o. year old male with lower back pain, consistent with     1. Lumbar postlaminectomy syndrome  Case Request Operating Room: Removal of spinal cord stimulator IPG    Hold warfarin 5 days prior to procedure    chlorhexidine (HIBICLENS) 4 % external liquid    sulfamethoxazole-trimethoprim 800-160mg (BACTRIM DS) 800-160 mg Tab    HYDROcodone-acetaminophen (NORCO) 7.5-325 mg per tablet      2. Spinal cord stimulator status  Ambulatory referral/consult to Pain Clinic    Case Request Operating Room: Removal of spinal cord stimulator IPG    Hold warfarin 5 days prior to procedure    chlorhexidine (HIBICLENS) 4 % external liquid    sulfamethoxazole-trimethoprim 800-160mg (BACTRIM DS) 800-160 mg Tab    HYDROcodone-acetaminophen (NORCO) 7.5-325 mg per tablet        Lumbar postlaminectomy syndrome  -     Case Request Operating Room: Removal of spinal cord stimulator IPG    Hold warfarin 5 days prior to " procedure  -     chlorhexidine (HIBICLENS) 4 % external liquid; Apply topically once daily. Please wash lower back with soap night before procedure  Dispense: 118 mL; Refill: 0  -     sulfamethoxazole-trimethoprim 800-160mg (BACTRIM DS) 800-160 mg Tab; Take 1 tablet by mouth once daily. Please start antibiotic day after procedure.  Take 1 pill daily with meals  Dispense: 5 tablet; Refill: 0  -     HYDROcodone-acetaminophen (NORCO) 7.5-325 mg per tablet; Take 1 tablet by mouth every 8 (eight) hours as needed for Pain.  Dispense: 21 tablet; Refill: 0    Spinal cord stimulator status  -     Ambulatory referral/consult to Pain Clinic  -     Case Request Operating Room: Removal of spinal cord stimulator IPG    Hold warfarin 5 days prior to procedure  -     chlorhexidine (HIBICLENS) 4 % external liquid; Apply topically once daily. Please wash lower back with soap night before procedure  Dispense: 118 mL; Refill: 0  -     sulfamethoxazole-trimethoprim 800-160mg (BACTRIM DS) 800-160 mg Tab; Take 1 tablet by mouth once daily. Please start antibiotic day after procedure.  Take 1 pill daily with meals  Dispense: 5 tablet; Refill: 0  -     HYDROcodone-acetaminophen (NORCO) 7.5-325 mg per tablet; Take 1 tablet by mouth every 8 (eight) hours as needed for Pain.  Dispense: 21 tablet; Refill: 0             PLAN:   - Interventions:   - we will schedule patient for removal of IPG of spinal cord stimulator as patient reports significant pain when he leans against objects.  We will receive clearance from cardiologist Dr. Barrios to hold warfarin 5 days before procedure    - Anticoagulation use:   Yes Coumadin, with pacemaker and prosthetic mitral valve    - Medications:  - patient prescribed Bactrim, Hibiclens, and hydrocodone for procedure    - Therapy:   - continue home exercises    - Imaging/Diagnostic:  - CT of chest and abdomen reviewed and location of spinal cord stimulator and IPG confirmed    - Consults:   - none at this  time      - Patient Questions: Answered all of the patient's questions regarding diagnosis, therapy, and treatment     This condition does not require this patient to take time off of work, and the primary goal of our Pain Management services is to improve the patient's functional capacity.     - Follow up visit: return to clinic 5 days after procedure        The above plan and management options were discussed at length with patient. Patient is in agreement with the above and verbalized understanding.    I discussed the goals of interventional chronic pain management with the patient on today's visit.  I explained the utility of injections for diagnostic and therapeutic purposes.  We discussed a multimodal approach to pain including treating the patient's given worst pain at any given time.  We will use a systematic approach to addressing pain.  We will also adopt a multimodal approach that includes injections, adjuvant medications, physical therapy, at times psychiatry.  There may be a limited role for opioid use intermittently in the treatment of pain, more particularly for acute pain although no one approach can be used as a sole treatment modality.    I emphasized the importance of regular exercise, core strengthening and stretching, diet and weight loss as a cornerstone of long-term pain management.      Moises Rosa MD  Interventional Pain Management  [facility]    Future Appointments   Date Time Provider Department Center   11/26/2024  8:00 AM Nancy Berrios NP [address]   12/13/2024 12:40 PM Buck Mason PA-C ON IN [facility]   1/28/2025 11:00 AM Hima Cannon MD [facility] DENZEL Egan           Disclaimer:  This note was prepared using voice recognition system and is likely to have sound alike errors that may have been overlooked even after proof reading.  Please call me with any questions      I spent a total of 30 minutes on the day of the visit.  This  includes face to face time and non-face to face time preparing to see the patient (eg, review of tests), obtaining and/or reviewing separately obtained history, documenting clinical information in the electronic or other health record, independently interpreting results and communicating results to the patient/family/caregiver, or care coordinator.

## 2024-11-19 NOTE — H&P (VIEW-ONLY)
New Patient Interventional Pain Note (Initial Visit)    Referring Physician: Hima Cannon MD    PCP: Hima Cannon MD    Chief Complaint:     Chief Complaint   Patient presents with    Low-back Pain     Patient having issues with SCS that was put in in 2010, Dr. Villa.  Pain level 2/10        SUBJECTIVE:    Claude L Taylor is a 89 y.o. male who presents to the clinic for the evaluation of lower back pain.   Patient reports that over the last 1 year he has noted significant pain over the IPG site of the spinal cord stimulator.  Patient denies any erythema or infection.  Patient denies any dehiscence of the incision.  Patient reports that spine is only painful when he leans against a chair or other objects with a back and presses against the IPG.  IPG was placed in 2010 and patient reports that the machine has not been operable since 2018.  Pain is currently rated a 3 out 10, but can increase to a 7/10 when he leans against the device.    Patient denies any fevers, chills, changes in gait, saddle anesthesia, or bowel and bladder incontinence.      Non-Pharmacologic Treatments:  Physical Therapy/Home Exercise: yes  Ice/Heat:yes  TENS: no  Acupuncture: no  Massage: yes  Chiropractic: no        Previous Pain Medications:  NSAIDs, Tylenol, muscle relaxers, neuropathics, opioids, topicals       report:  Reviewed and consistent with medication use as prescribed.    Pain Procedures:   none          Imaging:     XR CHEST PA AND LATERAL 12/06/2022     CLINICAL HISTORY:  Acute upper respiratory infection, unspecified     FINDINGS:  Comparison is made to August 1, 2022.     Left-sided cardiac conduction device. Median sternotomy wires.  Spinal stimulator leads.  Right shoulder rotator cuff repair.  Cholecystectomy clips.     Mediastinal silhouette is within normal limits.  Mild increased parenchymal opacity bilaterally.  No pneumothorax or pleural effusion.     No acute osseous finding.  Potential lytic  lesion at the inferior aspect of the posterior right 8th rib.     Impression:     1. Potential lytic lesion at the inferior aspect of the posterior right 8th rib.  Consider chest CT.  2. Mild increased parenchymal opacity bilaterally may reflect pulmonary edema or an atypical infectious/inflammatory process.    Past Medical History:   Diagnosis Date    *Atrial fibrillation     pacer Dr Barrios    Anticoagulant long-term use     Cancer     skin    Cervical neck pain with evidence of disc disease     Colon polyps 12/14/2015    Coronary artery disease     Depression     Diverticulosis     Hypertension     Kidney stone     Skin cancer      Past Surgical History:   Procedure Laterality Date    back fusion      CARDIAC PACEMAKER PLACEMENT      CARDIAC VALVE REPLACEMENT      CHOLECYSTECTOMY      COLONOSCOPY  03/22/2013    COLONOSCOPY N/A 12/14/2015    Procedure: COLONOSCOPY;  Surgeon: Gonzalo Gorman MD;  Location: [facility];  Service: Endoscopy;  Laterality: N/A;    CORONARY ARTERY BYPASS GRAFT      ELBOW BURSA SURGERY      left    EYE SURGERY      FOOT SURGERY      MITRAL VALVE REPLACEMENT      REPLACEMENT OF PACEMAKER  09/2019    SHOULDER SURGERY      SKIN CANCER EXCISION  01/2018/   04/2018 Dr.Thorla Jones    lower left leg     SPINE SURGERY  3 surgeries    TONSILLECTOMY       Social History     Socioeconomic History    Marital status:      Spouse name: marguerite Barron   Tobacco Use    Smoking status: Never    Smokeless tobacco: Never    Tobacco comments:     never a smoker   Substance and Sexual Activity    Alcohol use: Not Currently     Alcohol/week: 2.0 standard drinks of alcohol     Types: 1 Glasses of wine, 1 Cans of beer per week     Comment: drinks above only with meals ocassionaly    Drug use: Never    Sexual activity: Not Currently     Partners: Female     Comment: too old at 89     Social Drivers of Health     Financial Resource Strain: Low Risk  (11/12/2024)    Overall Financial Resource Strain (CARDIA)     " Difficulty of Paying Living Expenses: Not hard at all   Food Insecurity: No Food Insecurity (11/12/2024)    Hunger Vital Sign     Worried About Running Out of Food in the Last Year: Never true     Ran Out of Food in the Last Year: Never true   Physical Activity: Insufficiently Active (11/12/2024)    Exercise Vital Sign     Days of Exercise per Week: 3 days     Minutes of Exercise per Session: 10 min   Housing Stability: Unknown (11/12/2024)    Housing Stability Vital Sign     Unable to Pay for Housing in the Last Year: No     Family History   Problem Relation Name Age of Onset    Cancer Father Claude Lee Taylor     Arthritis Father Claude Lee Taylor     Colon cancer Neg Hx         Review of patient's allergies indicates:   Allergen Reactions    Oxycodone      "it's mental/emotional       Current Outpatient Medications   Medication Sig    celecoxib (CELEBREX) 50 MG capsule Take 1 capsule (50 mg total) by mouth 2 (two) times daily.    chlorhexidine (HIBICLENS) 4 % external liquid Apply topically once daily. Please wash lower back with soap night before procedure    fluorometholone 0.1% (FML) 0.1 % DrpS Place 1 drop in both eyes twice daily (Patient not taking: Reported on 10/7/2024)    furosemide (LASIX) 20 MG tablet Take 1 tablet by mouth as needed.    gabapentin (NEURONTIN) 100 MG capsule Take 100 mg by mouth.    HYDROcodone-acetaminophen (NORCO) 7.5-325 mg per tablet Take 1 tablet by mouth every 8 (eight) hours as needed for Pain.    QUEtiapine (SEROQUEL) 25 MG Tab Take 1 tablet (25 mg total) by mouth nightly as needed (sleep).    sulfamethoxazole-trimethoprim 800-160mg (BACTRIM DS) 800-160 mg Tab Take 1 tablet by mouth once daily. Please start antibiotic day after procedure.  Take 1 pill daily with meals    tamsulosin (FLOMAX) 0.4 mg Cap Take 2 capsules (0.8 mg total) by mouth every evening for prostate and urine flow    traMADoL (ULTRAM) 50 mg tablet Take 1 tablet (50 mg total) by mouth every 4 (four) hours as " "needed for Pain. (Patient not taking: Reported on 10/7/2024)    triamcinolone acetonide 0.025% (KENALOG) 0.025 % cream Apply topically 2 (two) times daily.    warfarin (COUMADIN) 5 MG tablet TAKE 1 TABLET BY MOUTH ONCE DAILY OR  AS  DIRECTED  BY  COUMADIN  CLINIC     No current facility-administered medications for this visit.         ROS  Review of Systems   Constitutional:  Negative for activity change, appetite change and fever.   HENT:  Negative for sore throat.    Respiratory:  Negative for cough, chest tightness, shortness of breath, wheezing and stridor.    Cardiovascular:  Negative for chest pain, palpitations and leg swelling.   Gastrointestinal:  Positive for constipation. Negative for abdominal pain, blood in stool, diarrhea, nausea and vomiting.   Endocrine: Negative for polydipsia, polyphagia and polyuria.   Genitourinary:  Positive for urgency. Negative for dysuria and hematuria.   Musculoskeletal:  Positive for arthralgias, back pain, gait problem, joint swelling and myalgias. Negative for neck pain and neck stiffness.   Skin:  Negative for rash.   Allergic/Immunologic: Negative for food allergies.   Neurological:  Positive for weakness, numbness and headaches. Negative for dizziness, tremors, seizures, syncope, facial asymmetry, speech difficulty and light-headedness.   Psychiatric/Behavioral:  Negative for agitation, hallucinations, self-injury and suicidal ideas. The patient is not nervous/anxious and is not hyperactive.             OBJECTIVE:  /67   Pulse 63   Resp 16   Ht 5' 9" (1.753 m)   Wt 72.7 kg (160 lb 4.4 oz)   BMI 23.67 kg/m²         Physical Exam  Constitutional:       General: He is not in acute distress.     Appearance: Normal appearance. He is not ill-appearing.   HENT:      Head: Normocephalic and atraumatic.      Nose: No congestion or rhinorrhea.   Eyes:      Extraocular Movements: Extraocular movements intact.      Pupils: Pupils are equal, round, and reactive to light. "   Cardiovascular:      Pulses: Normal pulses.   Pulmonary:      Effort: Pulmonary effort is normal.   Skin:     General: Skin is warm and dry.      Capillary Refill: Capillary refill takes less than 2 seconds.   Neurological:      General: No focal deficit present.      Mental Status: He is alert and oriented to person, place, and time.      Sensory: No sensory deficit.   Psychiatric:         Mood and Affect: Mood normal.         Behavior: Behavior normal.         Thought Content: Thought content normal.           Musculoskeletal:      Lumbar Exam  Incision: no  Pain with Flexion: no  Pain with Extension: yes  ROM:  Decreased  Paraspinous TTP:  Positive bilaterally    IPG incision is clean dry and intact with no signs purulence or drainage.  That has mild tenderness to palpation over the IPG site.    LABS:  Lab Results   Component Value Date    WBC 6.50 03/30/2023    HGB 12.8 (L) 03/30/2023    HCT 39.2 (L) 03/30/2023    MCV 93 03/30/2023     03/30/2023       CMP  Sodium   Date Value Ref Range Status   03/30/2023 138 136 - 145 mmol/L Final     Potassium   Date Value Ref Range Status   03/30/2023 5.1 3.5 - 5.1 mmol/L Final     Chloride   Date Value Ref Range Status   03/30/2023 107 95 - 110 mmol/L Final     CO2   Date Value Ref Range Status   03/30/2023 25 23 - 29 mmol/L Final     Glucose   Date Value Ref Range Status   03/30/2023 94 70 - 110 mg/dL Final     BUN   Date Value Ref Range Status   03/30/2023 9 (L) 10 - 30 mg/dL Final     Creatinine   Date Value Ref Range Status   03/30/2023 0.8 0.5 - 1.4 mg/dL Final     Calcium   Date Value Ref Range Status   03/30/2023 8.9 8.7 - 10.5 mg/dL Final     Total Protein   Date Value Ref Range Status   03/30/2023 6.6 6.0 - 8.4 g/dL Final     Albumin   Date Value Ref Range Status   03/30/2023 3.7 3.5 - 5.2 g/dL Final     Total Bilirubin   Date Value Ref Range Status   03/30/2023 1.4 (H) 0.1 - 1.0 mg/dL Final     Comment:     For infants and newborns, interpretation of  "results should be based  on gestational age, weight and in agreement with clinical  observations.    Premature Infant recommended reference ranges:  Up to 24 hours.............<8.0 mg/dL  Up to 48 hours............<12.0 mg/dL  3-5 days..................<15.0 mg/dL  6-29 days.................<15.0 mg/dL       Alkaline Phosphatase   Date Value Ref Range Status   03/30/2023 83 55 - 135 U/L Final     AST   Date Value Ref Range Status   03/30/2023 20 10 - 40 U/L Final     ALT   Date Value Ref Range Status   03/30/2023 9 (L) 10 - 44 U/L Final     Anion Gap   Date Value Ref Range Status   03/30/2023 6 (L) 8 - 16 mmol/L Final     eGFR if    Date Value Ref Range Status   02/08/2022 >60.0 >60 mL/min/1.73 m^2 Final     eGFR if non    Date Value Ref Range Status   02/08/2022 >60.0 >60 mL/min/1.73 m^2 Final     Comment:     Calculation used to obtain the estimated glomerular filtration  rate (eGFR) is the CKD-EPI equation.          No results found for: "LABA1C", "HGBA1C"          ASSESSMENT:       89 y.o. year old male with lower back pain, consistent with     1. Lumbar postlaminectomy syndrome  Case Request Operating Room: Removal of spinal cord stimulator IPG    Hold warfarin 5 days prior to procedure    chlorhexidine (HIBICLENS) 4 % external liquid    sulfamethoxazole-trimethoprim 800-160mg (BACTRIM DS) 800-160 mg Tab    HYDROcodone-acetaminophen (NORCO) 7.5-325 mg per tablet      2. Spinal cord stimulator status  Ambulatory referral/consult to Pain Clinic    Case Request Operating Room: Removal of spinal cord stimulator IPG    Hold warfarin 5 days prior to procedure    chlorhexidine (HIBICLENS) 4 % external liquid    sulfamethoxazole-trimethoprim 800-160mg (BACTRIM DS) 800-160 mg Tab    HYDROcodone-acetaminophen (NORCO) 7.5-325 mg per tablet        Lumbar postlaminectomy syndrome  -     Case Request Operating Room: Removal of spinal cord stimulator IPG    Hold warfarin 5 days prior to " procedure  -     chlorhexidine (HIBICLENS) 4 % external liquid; Apply topically once daily. Please wash lower back with soap night before procedure  Dispense: 118 mL; Refill: 0  -     sulfamethoxazole-trimethoprim 800-160mg (BACTRIM DS) 800-160 mg Tab; Take 1 tablet by mouth once daily. Please start antibiotic day after procedure.  Take 1 pill daily with meals  Dispense: 5 tablet; Refill: 0  -     HYDROcodone-acetaminophen (NORCO) 7.5-325 mg per tablet; Take 1 tablet by mouth every 8 (eight) hours as needed for Pain.  Dispense: 21 tablet; Refill: 0    Spinal cord stimulator status  -     Ambulatory referral/consult to Pain Clinic  -     Case Request Operating Room: Removal of spinal cord stimulator IPG    Hold warfarin 5 days prior to procedure  -     chlorhexidine (HIBICLENS) 4 % external liquid; Apply topically once daily. Please wash lower back with soap night before procedure  Dispense: 118 mL; Refill: 0  -     sulfamethoxazole-trimethoprim 800-160mg (BACTRIM DS) 800-160 mg Tab; Take 1 tablet by mouth once daily. Please start antibiotic day after procedure.  Take 1 pill daily with meals  Dispense: 5 tablet; Refill: 0  -     HYDROcodone-acetaminophen (NORCO) 7.5-325 mg per tablet; Take 1 tablet by mouth every 8 (eight) hours as needed for Pain.  Dispense: 21 tablet; Refill: 0             PLAN:   - Interventions:   - we will schedule patient for removal of IPG of spinal cord stimulator as patient reports significant pain when he leans against objects.  We will receive clearance from cardiologist Dr. Barrios to hold warfarin 5 days before procedure    - Anticoagulation use:   Yes Coumadin, with pacemaker and prosthetic mitral valve    - Medications:  - patient prescribed Bactrim, Hibiclens, and hydrocodone for procedure    - Therapy:   - continue home exercises    - Imaging/Diagnostic:  - CT of chest and abdomen reviewed and location of spinal cord stimulator and IPG confirmed    - Consults:   - none at this  time      - Patient Questions: Answered all of the patient's questions regarding diagnosis, therapy, and treatment     This condition does not require this patient to take time off of work, and the primary goal of our Pain Management services is to improve the patient's functional capacity.     - Follow up visit: return to clinic 5 days after procedure        The above plan and management options were discussed at length with patient. Patient is in agreement with the above and verbalized understanding.    I discussed the goals of interventional chronic pain management with the patient on today's visit.  I explained the utility of injections for diagnostic and therapeutic purposes.  We discussed a multimodal approach to pain including treating the patient's given worst pain at any given time.  We will use a systematic approach to addressing pain.  We will also adopt a multimodal approach that includes injections, adjuvant medications, physical therapy, at times psychiatry.  There may be a limited role for opioid use intermittently in the treatment of pain, more particularly for acute pain although no one approach can be used as a sole treatment modality.    I emphasized the importance of regular exercise, core strengthening and stretching, diet and weight loss as a cornerstone of long-term pain management.      Moises Rosa MD  Interventional Pain Management  [facility]    Future Appointments   Date Time Provider Department Center   11/26/2024  8:00 AM Nancy Berrios NP [address]   12/13/2024 12:40 PM Buck Mason PA-C ON IN [facility]   1/28/2025 11:00 AM Hima Cannon MD [facility] DENZEL Egan           Disclaimer:  This note was prepared using voice recognition system and is likely to have sound alike errors that may have been overlooked even after proof reading.  Please call me with any questions      I spent a total of 30 minutes on the day of the visit.  This  includes face to face time and non-face to face time preparing to see the patient (eg, review of tests), obtaining and/or reviewing separately obtained history, documenting clinical information in the electronic or other health record, independently interpreting results and communicating results to the patient/family/caregiver, or care coordinator.

## 2024-11-27 ENCOUNTER — TELEPHONE (OUTPATIENT)
Dept: PAIN MEDICINE | Facility: CLINIC | Age: 89
End: 2024-11-27
Payer: MEDICARE

## 2024-11-27 NOTE — TELEPHONE ENCOUNTER
----- Message from Med Assistant Mandujano sent at 11/26/2024  4:57 PM CST -----  Contact: Demetrice/CORRINE Medrano office    ----- Message -----  From: Dominic Briseno  Sent: 11/26/2024   3:46 PM CST  To: Shelley Suresh is needing a call back in regards to the patients procedure. Please give her a call back at  [phone]

## 2024-11-27 NOTE — TELEPHONE ENCOUNTER
Called win from [facility] but the  said win wasn't able to come to the phone and she would call back.  Grady kemp

## 2024-11-27 NOTE — TELEPHONE ENCOUNTER
----- Message from Wendy sent at 11/27/2024  1:58 PM CST -----  Name of Caller: Bart with Dr. Mitchell Loya Call Back Number:[phone]  Additional Information: She is requesting a call back from Amber regarding the patient.

## 2024-11-27 NOTE — TELEPHONE ENCOUNTER
Saul I called [facility] Dr. Medrano office and they said patient didn't have a spinal cord stimulator and wanted to know what was the clearance for. I informed them that I would reach out to the surgery scheduler.    Nazia kemp

## 2024-12-02 ENCOUNTER — TELEPHONE (OUTPATIENT)
Dept: PREADMISSION TESTING | Facility: HOSPITAL | Age: 89
End: 2024-12-02
Payer: MEDICARE

## 2024-12-02 NOTE — TELEPHONE ENCOUNTER
Called and spoke with patient regarding [facility] appointment. Patient informed that due to their medical history, it is recommended that they be seen by the Pre-Admit Nurse Practitioner or their PCP in the clinic prior to their scheduled Surgery on 12/9/24. Patient refused to make appointment at this time d/t transportation issues. Patient educated on the importance of being fully optimized prior to Surgery date, as Anesthesia will need to evaluate patient day of surgery in Pre op area. Patient verbalized understanding.        -Surgery Pre Admit Testing Dept.

## 2024-12-05 ENCOUNTER — PATIENT MESSAGE (OUTPATIENT)
Dept: PREADMISSION TESTING | Facility: HOSPITAL | Age: 89
End: 2024-12-05
Payer: MEDICARE

## 2024-12-05 NOTE — PRE-PROCEDURE INSTRUCTIONS
Pre op instructions reviewed with pt over telephone, verbalized understanding.    Procedure Date: 12/9/24  Arrival Time:  TBD; We will call you after 2pm the day before your procedure with your arrival time.    Address:   [facility] (Off [facility] on the left)  [id number] [place] [address] [place] [address]  >>>Please enter through front entrance Lobby of 1st floor to Registration desk<<<      !!!INSTRUCTIONS IMPORTANT!!!  NO FOOD or tobacco products after midnight the night before surgery! You may have clear liquids up to 3 hrs before your arrival to the Hospital  Clear liquids include Gatorade, water, soda, black coffee or tea (no milk or creamer), and clear juices.  Clear liquids do NOT include anything with pulp or food particles (Chicken broth, ice cream, yogurt, Jello, etc.)    >>>MEDICATION INSTRUCTIONS<<<: Morning of Surgery, take small sip of water with ONLY these medications:  None     Additional Instructions: continue to hold coumadin until after surgery      *Diabetic/ Prediabetic Patients: !!!If you take diabetic or weight loss medication, Do NOT take morning of surgery unless instructed by Doctor!!!  Metformin to be stopped 24 hrs prior to surgery.   Long Acting Insulin Instructions: HOLD the night before surgery unless instructed differently by Provider!  Ozempic/ Mounjaro/ Wegovy/ Trulicity/ Semaglutide injections or weight loss medication to be stopped 7 days prior to surgery.    !!!STOP ALL Aspirins, NSAIDS, WEIGHT LOSS INJECTIONS/PILLS, Herbal supplements, & Vitamins 7 DAYS BEFORE SURGERY!!!    ____  Avoid Alcoholic beverages 3 days prior to surgery, as it can thin the blood.  ____  NO Acrylic/fake nails or nail polish worn day of surgery (specifically hand/arm & foot surgeries).  ____  NO powder, lotions, deodorants, oils or cream on body.  ____  Remove all jewelry & piercings & foreign objects before arrival & leave at home.  ____  Remove Dentures, Hearing Aids & Contact  Lens prior to surgery.  ____  Bring photo ID and insurance information to hospital (Leave Valuables at Home).  ____  If going home the same day, arrange for a ride home. You will not be able to drive for 24 hrs if Anesthesia was used.   ____  Females (ages 11-60): may need to give a urine sample the morning of surgery; please see Pre op Nurse prior to using the restroom.  ____  Males: Stop ED medications (Viagra, Cialis) 24 hrs prior to surgery.  ____  Wear clean, loose fitting clothing to allow for dressings/ bandages.      Bathing Instructions:    -Shower with anti-bacterial Soap (Hibiclens or Dial) the night before surgery and the morning of.   -Do not use Hibiclens on your face or genitals.   -Apply clean clothes after shower.  -Do not shave your face or body 2 days prior to surgery unless instructed otherwise by your Surgeon.  -Do not shave pubic hair 7 days prior to surgery (gyn pt's).    [facility] Visitor/Ride Policy:  Only 2 adults allowed in pre op/recovery area during your procedure. You MUST HAVE A RIDE HOME from a responsible adult that you know and trust. Medical Transport, Uber or Lyft can ONLY be used if patient has a responsible adult to accompany them during ride home.       *Signs and symptoms of Infection Before or After Surgery:               !!!If you experience any fever, chills, nausea/ vomiting, foul odor/ excessive drainage from surgical site, flu-like symptoms, new wounds or cuts, PLEASE CALL THE SURGEON OFFICE at [phone] or SEND MESSAGE THROUGH [facility] PORTAL!!!     *If you are running late the morning of surgery, please call the Hospital Surgery Dept @ [phone].     *Billing questions:  [phone]     Thank you,  -[facility] Pre Admit Dept.  [phone] or [phone]  M-F 7:30 am-4:00 pm (Closed Major Holidays)

## 2024-12-06 ENCOUNTER — PATIENT MESSAGE (OUTPATIENT)
Dept: PREADMISSION TESTING | Facility: HOSPITAL | Age: 89
End: 2024-12-06
Payer: MEDICARE

## 2024-12-06 NOTE — PRE-PROCEDURE INSTRUCTIONS
Called and spoke with pt about the following:     Please arrive to [facility] ([facility] at 12 pm on 12/9/2024 for your scheduled procedure.  Address: [address] [place] [address] [place] [address] (2nd Building on left, 1st Floor Lobby)    !!!NO FOOD after midnight! You may have clear liquids up to 3 hrs before your arrival to the Hospital!!!  Clear liquids include Gatorade, water, soda, black coffee or tea (no milk or creamer), and clear juices.  Clear liquids do NOT include anything with pulp or food particles (Chicken broth, ice cream, yogurt, Jello, etc.)    Thank you,  -[facility] Pre Admit Dept.  Mon-Fri 7:30 am - 4 pm [phone]

## 2024-12-09 ENCOUNTER — ANESTHESIA (OUTPATIENT)
Dept: SURGERY | Facility: HOSPITAL | Age: 89
End: 2024-12-09
Payer: MEDICARE

## 2024-12-09 ENCOUNTER — HOSPITAL ENCOUNTER (OUTPATIENT)
Facility: HOSPITAL | Age: 89
Discharge: HOME | End: 2024-12-09
Attending: ANESTHESIOLOGY | Admitting: ANESTHESIOLOGY
Payer: MEDICARE

## 2024-12-09 ENCOUNTER — ANESTHESIA EVENT (OUTPATIENT)
Dept: SURGERY | Facility: HOSPITAL | Age: 89
End: 2024-12-09
Payer: MEDICARE

## 2024-12-09 DIAGNOSIS — M96.1 LUMBAR POSTLAMINECTOMY SYNDROME: ICD-10-CM

## 2024-12-09 DIAGNOSIS — Z01.810 PREOP CARDIOVASCULAR EXAM: ICD-10-CM

## 2024-12-09 PROCEDURE — 71000015 HC POSTOP RECOV 1ST HR: Performed by: ANESTHESIOLOGY

## 2024-12-09 PROCEDURE — 93010 ELECTROCARDIOGRAM REPORT: CPT | Mod: ,,, | Performed by: INTERNAL MEDICINE

## 2024-12-09 PROCEDURE — 36000707: Performed by: ANESTHESIOLOGY

## 2024-12-09 PROCEDURE — 37000008 HC ANESTHESIA 1ST 15 MINUTES: Performed by: ANESTHESIOLOGY

## 2024-12-09 PROCEDURE — 71000033 HC RECOVERY, INTIAL HOUR: Performed by: ANESTHESIOLOGY

## 2024-12-09 PROCEDURE — 36000706: Performed by: ANESTHESIOLOGY

## 2024-12-09 PROCEDURE — 63600175 PHARM REV CODE 636 W HCPCS: Performed by: NURSE ANESTHETIST, CERTIFIED REGISTERED

## 2024-12-09 PROCEDURE — 93005 ELECTROCARDIOGRAM TRACING: CPT

## 2024-12-09 PROCEDURE — 37000009 HC ANESTHESIA EA ADD 15 MINS: Performed by: ANESTHESIOLOGY

## 2024-12-09 PROCEDURE — 63600175 PHARM REV CODE 636 W HCPCS: Mod: JZ,JG | Performed by: ANESTHESIOLOGY

## 2024-12-09 PROCEDURE — 63650 IMPLANT NEUROELECTRODES: CPT | Mod: ,,, | Performed by: ANESTHESIOLOGY

## 2024-12-09 PROCEDURE — 63685 INS/RPLC SPI NPG/RCVR POCKET: CPT | Mod: ,,, | Performed by: ANESTHESIOLOGY

## 2024-12-09 RX ORDER — BUPIVACAINE HYDROCHLORIDE 2.5 MG/ML
INJECTION, SOLUTION EPIDURAL; INFILTRATION; INTRACAUDAL
Status: DISCONTINUED | OUTPATIENT
Start: 2024-12-09 | End: 2024-12-09 | Stop reason: HOSPADM

## 2024-12-09 RX ORDER — ONDANSETRON HYDROCHLORIDE 2 MG/ML
4 INJECTION, SOLUTION INTRAVENOUS ONCE AS NEEDED
Status: DISCONTINUED | OUTPATIENT
Start: 2024-12-09 | End: 2024-12-09 | Stop reason: HOSPADM

## 2024-12-09 RX ORDER — PROPOFOL 10 MG/ML
VIAL (ML) INTRAVENOUS
Status: DISCONTINUED | OUTPATIENT
Start: 2024-12-09 | End: 2024-12-09

## 2024-12-09 RX ORDER — CEFAZOLIN SODIUM 1 G/3ML
INJECTION, POWDER, FOR SOLUTION INTRAMUSCULAR; INTRAVENOUS
Status: DISCONTINUED | OUTPATIENT
Start: 2024-12-09 | End: 2024-12-09

## 2024-12-09 RX ORDER — LIDOCAINE HYDROCHLORIDE 20 MG/ML
INJECTION, SOLUTION EPIDURAL; INFILTRATION; INTRACAUDAL; PERINEURAL
Status: DISCONTINUED | OUTPATIENT
Start: 2024-12-09 | End: 2024-12-09

## 2024-12-09 RX ORDER — KETOROLAC TROMETHAMINE 30 MG/ML
15 INJECTION, SOLUTION INTRAMUSCULAR; INTRAVENOUS EVERY 8 HOURS PRN
Status: DISCONTINUED | OUTPATIENT
Start: 2024-12-09 | End: 2024-12-09 | Stop reason: HOSPADM

## 2024-12-09 RX ORDER — ONDANSETRON HYDROCHLORIDE 2 MG/ML
4 INJECTION, SOLUTION INTRAVENOUS DAILY PRN
Status: DISCONTINUED | OUTPATIENT
Start: 2024-12-09 | End: 2024-12-09 | Stop reason: HOSPADM

## 2024-12-09 RX ADMIN — PROPOFOL 20 MG: 10 INJECTION, EMULSION INTRAVENOUS at 03:12

## 2024-12-09 RX ADMIN — PROPOFOL 20 MG: 10 INJECTION, EMULSION INTRAVENOUS at 02:12

## 2024-12-09 RX ADMIN — PROPOFOL 10 MG: 10 INJECTION, EMULSION INTRAVENOUS at 02:12

## 2024-12-09 RX ADMIN — SODIUM CHLORIDE, SODIUM LACTATE, POTASSIUM CHLORIDE, AND CALCIUM CHLORIDE: .6; .31; .03; .02 INJECTION, SOLUTION INTRAVENOUS at 01:12

## 2024-12-09 RX ADMIN — LIDOCAINE HYDROCHLORIDE 10 ML: 10; .005 INJECTION, SOLUTION EPIDURAL; INFILTRATION; INTRACAUDAL; PERINEURAL at 01:12

## 2024-12-09 RX ADMIN — PROPOFOL 10 MG: 10 INJECTION, EMULSION INTRAVENOUS at 03:12

## 2024-12-09 RX ADMIN — LIDOCAINE HYDROCHLORIDE 40 MG: 20 INJECTION, SOLUTION EPIDURAL; INFILTRATION; INTRACAUDAL; PERINEURAL at 02:12

## 2024-12-09 RX ADMIN — CEFAZOLIN 2 G: 330 INJECTION, POWDER, FOR SOLUTION INTRAMUSCULAR; INTRAVENOUS at 02:12

## 2024-12-09 RX ADMIN — PROPOFOL 10 MG: 10 INJECTION, EMULSION INTRAVENOUS at 01:12

## 2024-12-09 NOTE — OP NOTE
Spinal Cord Stimulator mentation     ,INFORMED CONSENT: The procedure, risks, benefits and options were discussed with patient. There are no contraindications to the procedure. The patient expressed understanding and agreed to proceed. The personnel performing the procedure was discussed.    Date of procedure 12/09/2024    Time-out taken to identify patient and procedure side prior to starting the procedure.                                          Procedure:   1. Explantation spinal cord stimulator IPG and leads under fluoroscopy     Pre Procedure diagnosis:    Spinal cord stimulator status [Z96.89]  Lumbar postlaminectomy syndrome [M96.1]  1. Lumbar postlaminectomy syndrome    2. Preop cardiovascular exam        Post-Procedure diagnosis:   same       Local Anesthestic: 10 ML of 1% lidocaine PF, 10 mL of 0.5% bupivacaine with epinephrine     Surgeon: Moises Rosa MD     EBL:  50 ml     Complications: None      Specimens: None       Sedation:  Monitored anesthesia care        PREOPERATIVE ANTIBIOTICS:  2 g Ancef IV given 30 minutes prior to procedure start, see anesthesia record for time.     DESCRIPTION OF PROCEDURE:  The patient was brought to the operating room. IV access was obtained prior to the procedure. The patient was positioned prone on the fluoroscopy table.   The patient was then prepped and draped in sterile fashion     1st and incision was made over the IPG site over the previous incision.  Hemostasis was achieved with electrocautery.  The IPG was removed from the pocket in the leads were removed from the IPG.    Next attention was turned toward the T7 vertebral body where the spinal cord stimulator leads were entering the epidural space.  Once again a incision was made over the previous midline incision and hemostasis achieved with Bovie cautery.  The leads were identified with no anchor device present.  Light traction was placed on both leads, but the 8 contacts could not be reviewed with the  epidural space.  Due to safety concerns, incentive continuing to remove the leads the leads were cut after position in the interspinous ligament.    The 2 incisions were then copiously irrigated with 1 L of normal saline with 2 g of Ancef.  The battery was then placed in the IPG pocket with a retention loop left in the midline incision.    Both incisions were then closed at the fascial layers using 2-0 Vicryl in a simple interrupted suture technique.  The subcutaneous layers were then closed with 3-0 Monocryl in a running subcuticular fashion.     Mastisol, Steri-Strips, gauze, and Tegaderm was then used as a dressing for each incision.     The patient was transferred to the postoperative area in stable condition.    The patient's postoperative neurological examination showed no evidence of lower extremity weakness with normal bowel and bladder function.

## 2024-12-09 NOTE — TRANSFER OF CARE
"Anesthesia Transfer of Care Note    Patient: Claude L Taylor    Procedure(s) Performed: Procedure(s) (LRB):  REMOVAL, NEUROSTIMULATOR, SPINAL (N/A)    Patient location: PACU    Anesthesia Type: MAC    Transport from OR: Transported from OR on room air with adequate spontaneous ventilation    Post pain: adequate analgesia    Post assessment: no apparent anesthetic complications    Post vital signs: stable    Level of consciousness: awake and alert    Nausea/Vomiting: no nausea/vomiting    Complications: none    Transfer of care protocol was followed      Last vitals: Visit Vitals  BP (!) 185/87   Pulse 80   Temp 37 °C (98.6 °F) (Temporal)   Resp 16   Ht 5' 9" (1.753 m)   Wt 71.5 kg (157 lb 10.1 oz)   SpO2 98%   BMI 23.28 kg/m²     "

## 2024-12-09 NOTE — ANESTHESIA PREPROCEDURE EVALUATION
12/09/2024  Claude L Taylor is a 89 y.o., male.    Patient Active Problem List   Diagnosis    Depression    Asthma in adult    Long term (current) use of anticoagulants    Osteoarthritis    Chronic back pain    Altered bowel habits    Anismus    Diverticulosis of large intestine without hemorrhage    Chronic constipation    Hx of CABG    Coronary artery disease involving native coronary artery of native heart without angina pectoris    Spinal stenosis of lumbar region at multiple levels    DDD (degenerative disc disease), lumbosacral    Proximal muscle weakness    OAB (overactive bladder)    Permanent atrial fibrillation    SOB (shortness of breath) on exertion    Benign prostatic hyperplasia with weak urinary stream    Anticoagulated on Coumadin    B12 deficiency due to diet    Complete AV block    Cardiac pacemaker    Hx of prosthetic mitral valve    Nonrheumatic mitral (valve) insufficiency    Peripheral vascular disease    Primary osteoarthritis of both knees    Atrial fibrillation, chronic    Osteoporosis    Dependence on other enabling machines and devices    Positive SHOAIB (antinuclear antibody)    Keratoconjunctivitis sicca due to decreased tear production, bilateral    Primary osteoarthritis involving multiple joints     Past Surgical History:   Procedure Laterality Date    back fusion      CARDIAC PACEMAKER PLACEMENT      CARDIAC VALVE REPLACEMENT      CHOLECYSTECTOMY      COLONOSCOPY  03/22/2013    COLONOSCOPY N/A 12/14/2015    Procedure: COLONOSCOPY;  Surgeon: Gonzalo Gormna MD;  Location: [facility];  Service: Endoscopy;  Laterality: N/A;    CORONARY ARTERY BYPASS GRAFT      ELBOW BURSA SURGERY      left    EYE SURGERY      FOOT SURGERY      MITRAL VALVE REPLACEMENT      REPLACEMENT OF PACEMAKER  09/2019    SHOULDER SURGERY      SKIN CANCER EXCISION  01/2018/   04/2018 Dr.Thorla Snow  left leg     SPINE SURGERY  3 surgeries    TONSILLECTOMY         Pre-op Assessment    I have reviewed the Patient Summary Reports.    I have reviewed the NPO Status.   I have reviewed the Medications.     Review of Systems  Anesthesia Hx:  No problems with previous Anesthesia                Social:  Non-Smoker       Hematology/Oncology:  Hematology Normal                                     Cardiovascular:    Pacemaker Hypertension   CAD   CABG/stent Dysrhythmias          ECG has been reviewed. S/p valve replacement/CABG.      Seen by Cards                           Pulmonary:    Asthma                    Renal/:   renal calculi               Hepatic/GI:  Hepatic/GI Normal                    Musculoskeletal:  Arthritis               Neurological:  Neurology Normal                                      Endocrine:  Endocrine Normal                Physical Exam  General: Well nourished    Airway:  Mallampati: II   Mouth Opening: Normal  TM Distance: Normal  Neck ROM: Normal ROM    Dental:  Intact        Anesthesia Plan  Type of Anesthesia, risks & benefits discussed:    Anesthesia Type: MAC  Intra-op Monitoring Plan: Standard ASA Monitors  Post Op Pain Control Plan: multimodal analgesia  Induction:  IV  Airway Plan: , Post-Induction  Informed Consent: Informed consent signed with the Patient and all parties understand the risks and agree with anesthesia plan.  All questions answered.   ASA Score: 3  Anesthesia Plan Notes: Very light MAC on an elderly gentleman.    Ready For Surgery From Anesthesia Perspective.     .      Chemistry        Component Value Date/Time     03/30/2023 1136    K 5.1 03/30/2023 1136     03/30/2023 1136    CO2 25 03/30/2023 1136    BUN 9 (L) 03/30/2023 1136    CREATININE 0.8 03/30/2023 1136    GLU 94 03/30/2023 1136        Component Value Date/Time    CALCIUM 8.9 03/30/2023 1136    ALKPHOS 83 03/30/2023 1136    AST 20 03/30/2023 1136    ALT 9 (L) 03/30/2023 1136    BILITOT 1.4 (H)  03/30/2023 1136    ESTGFRAFRICA >60.0 02/08/2022 1200    EGFRNONAA >60.0 02/08/2022 1200        Lab Results   Component Value Date    WBC 6.50 03/30/2023    HGB 12.8 (L) 03/30/2023    HCT 39.2 (L) 03/30/2023    MCV 93 03/30/2023     03/30/2023       Electronic ventricular pacemaker   When compared with ECG of 09-NOV-2006 08:45,   Vent. rate has decreased BY   3 BPM   Confirmed by JAK RODRIGEZ MD ([id number]) on 7/17/2013 9:18:21 PM

## 2024-12-09 NOTE — DISCHARGE SUMMARY
Discharge Note  Short Stay      SUMMARY     Admit Date: 2024    Attending Physician: Moises Rosa MD        Discharge Physician: Moises Rosa MD        Discharge Date: 2024 3:33 PM    Procedure(s) (LRB):  REMOVAL, NEUROSTIMULATOR, SPINAL (N/A)    Final Diagnosis: Spinal cord stimulator status [Z96.89]  Lumbar postlaminectomy syndrome [M96.1]    Disposition: Home or self care    Patient Instructions:   Current Discharge Medication List        CONTINUE these medications which have NOT CHANGED    Details   celecoxib (CELEBREX) 50 MG capsule Take 1 capsule (50 mg total) by mouth 2 (two) times daily.  Qty: 180 capsule, Refills: 3    Associated Diagnoses: Acute left-sided low back pain without sciatica; DDD (degenerative disc disease), lumbosacral; Spinal stenosis of lumbar region at multiple levels; Pain in both feet      furosemide (LASIX) 20 MG tablet Take 1 tablet by mouth as needed.  Qty: 180 tablet, Refills: 3      QUEtiapine (SEROQUEL) 25 MG Tab Take 1 tablet (25 mg total) by mouth nightly as needed (sleep).  Qty: 90 tablet, Refills: 3    Associated Diagnoses: Sleep difficulties      tamsulosin (FLOMAX) 0.4 mg Cap Take 2 capsules (0.8 mg total) by mouth every evening for prostate and urine flow  Qty: 180 capsule, Refills: 3    Associated Diagnoses: Benign prostatic hyperplasia with weak urinary stream      warfarin (COUMADIN) 5 MG tablet TAKE 1 TABLET BY MOUTH ONCE DAILY OR  AS  DIRECTED  BY  COUMADIN  CLINIC  Qty: 90 tablet, Refills: 0    Associated Diagnoses: Atrial fibrillation, chronic      chlorhexidine (HIBICLENS) 4 % external liquid Apply topically once daily. Please wash lower back with soap night before procedure  Qty: 118 mL, Refills: 0    Associated Diagnoses: Spinal cord stimulator status; Lumbar postlaminectomy syndrome      fluorometholone 0.1% (FML) 0.1 % DrpS Place 1 drop in both eyes twice daily  Qty: 5 mL, Refills: 5    Comments: Suggested Packagin.0 Milliliters drop btl.       HYDROcodone-acetaminophen (NORCO) 7.5-325 mg per tablet Take 1 tablet by mouth every 8 (eight) hours as needed for Pain.  Qty: 21 tablet, Refills: 0    Comments: Quantity prescribed more than 7 day supply? Yes, quantity medically necessary  Associated Diagnoses: Spinal cord stimulator status; Lumbar postlaminectomy syndrome      sulfamethoxazole-trimethoprim 800-160mg (BACTRIM DS) 800-160 mg Tab Take 1 tablet by mouth once daily. Please start antibiotic day after procedure.  Take 1 pill daily with meals  Qty: 5 tablet, Refills: 0    Associated Diagnoses: Spinal cord stimulator status; Lumbar postlaminectomy syndrome      triamcinolone acetonide 0.025% (KENALOG) 0.025 % cream Apply topically 2 (two) times daily.                 Discharge Diagnosis: Spinal cord stimulator status [Z96.89]  Lumbar postlaminectomy syndrome [M96.1]  Condition on Discharge: Stable with no complications to procedure   Diet on Discharge: Same as before.  Activity: as per instruction sheet.  Discharge to: Home with a responsible adult.  Follow up: 2-4 weeks       Please call the office at [phone] if you experience any weakness or loss of sensation, fever > 101.5, pain uncontrolled with oral medications, persistent nausea/vomiting/or diarrhea, redness or drainage from the incisions, or any other worrisome concerns. If physician on call was not reached or could not communicate with our office for any reason please go to the nearest emergency department

## 2024-12-10 NOTE — ANESTHESIA POSTPROCEDURE EVALUATION
Anesthesia Post Evaluation    Patient: Claude L Taylor    Procedure(s) Performed: Procedure(s) (LRB):  REMOVAL, NEUROSTIMULATOR, SPINAL (N/A)    Final Anesthesia Type: MAC      Patient location during evaluation: PACU  Patient participation: Yes- Able to Participate  Level of consciousness: awake and alert  Post-procedure vital signs: reviewed and stable  Airway patency: patent      Anesthetic complications: no      Cardiovascular status: blood pressure returned to baseline  Respiratory status: unassisted and spontaneous ventilation  Hydration status: euvolemic  Follow-up not needed.              Vitals Value Taken Time   /85 12/09/24 1545   Temp 36.4 °C (97.5 °F) 12/09/24 1545   Pulse 66 12/09/24 1545   Resp 16 12/09/24 1545   SpO2 97 % 12/09/24 1545         Event Time   Out of Recovery 15:47:19         Pain/Renee Score: Renee Score: 10 (12/9/2024  3:47 PM)

## 2024-12-11 VITALS
BODY MASS INDEX: 23.35 KG/M2 | OXYGEN SATURATION: 97 % | TEMPERATURE: 98 F | RESPIRATION RATE: 16 BRPM | HEART RATE: 66 BPM | WEIGHT: 157.63 LBS | HEIGHT: 69 IN | DIASTOLIC BLOOD PRESSURE: 85 MMHG | SYSTOLIC BLOOD PRESSURE: 183 MMHG

## 2024-12-11 LAB
OHS QRS DURATION: 168 MS
OHS QTC CALCULATION: 490 MS

## 2024-12-13 ENCOUNTER — OFFICE VISIT (OUTPATIENT)
Dept: PAIN MEDICINE | Facility: CLINIC | Age: 89
End: 2024-12-13
Payer: MEDICARE

## 2024-12-13 VITALS
DIASTOLIC BLOOD PRESSURE: 79 MMHG | SYSTOLIC BLOOD PRESSURE: 153 MMHG | HEIGHT: 69 IN | HEART RATE: 62 BPM | RESPIRATION RATE: 17 BRPM | WEIGHT: 162.13 LBS | BODY MASS INDEX: 24.01 KG/M2

## 2024-12-13 DIAGNOSIS — Z96.89 SPINAL CORD STIMULATOR STATUS: Primary | ICD-10-CM

## 2024-12-13 DIAGNOSIS — I48.20 ATRIAL FIBRILLATION, CHRONIC: ICD-10-CM

## 2024-12-13 DIAGNOSIS — Z48.89 ENCOUNTER FOR POSTOPERATIVE WOUND CHECK: ICD-10-CM

## 2024-12-13 PROCEDURE — 99999 PR PBB SHADOW E&M-EST. PATIENT-LVL III: CPT | Mod: PBBFAC,,, | Performed by: PHYSICIAN ASSISTANT

## 2024-12-13 PROCEDURE — 1159F MED LIST DOCD IN RCRD: CPT | Mod: CPTII,S$GLB,, | Performed by: PHYSICIAN ASSISTANT

## 2024-12-13 PROCEDURE — 1125F AMNT PAIN NOTED PAIN PRSNT: CPT | Mod: CPTII,S$GLB,, | Performed by: PHYSICIAN ASSISTANT

## 2024-12-13 PROCEDURE — 1101F PT FALLS ASSESS-DOCD LE1/YR: CPT | Mod: CPTII,S$GLB,, | Performed by: PHYSICIAN ASSISTANT

## 2024-12-13 PROCEDURE — 99024 POSTOP FOLLOW-UP VISIT: CPT | Mod: S$GLB,,, | Performed by: PHYSICIAN ASSISTANT

## 2024-12-13 PROCEDURE — 3288F FALL RISK ASSESSMENT DOCD: CPT | Mod: CPTII,S$GLB,, | Performed by: PHYSICIAN ASSISTANT

## 2024-12-13 RX ORDER — WARFARIN SODIUM 5 MG/1
TABLET ORAL
Qty: 90 TABLET | Refills: 3 | Status: SHIPPED | OUTPATIENT
Start: 2024-12-13

## 2024-12-13 NOTE — PATIENT INSTRUCTIONS
For Incisions (Wound Care):    Dermabond is in place. Cover both areas with clean dressing daily.     Do not submerge in water ( no baths, use of swimming pool or hot tub).     You can shower starting tomorrow.       If incision starts draining or opening, please call the office ASAP.

## 2024-12-13 NOTE — TELEPHONE ENCOUNTER
Refill Routing Note   Medication(s) are not appropriate for processing by [facility] for the following reason(s):        Outside of protocol    [facility] action(s):  Route               Appointments  past 12m or future 3m with PCP    Date Provider   Last Visit   10/7/2024 Hima Cannon MD   Next Visit   1/28/2025 Hima Cannon MD   ED visits in past 90 days: 0        Note composed:4:46 PM 12/13/2024

## 2024-12-13 NOTE — PROGRESS NOTES
Established Patient Interventional Pain Note ( Follow up Visit)    Referring Physician: No ref. provider found    PCP: Hima Cannon MD    Chief Complaint:     Chief Complaint   Patient presents with    Follow-up     Dressing change, pain level at 2/10.        SUBJECTIVE:    Interval History (12/13/2024):   Claude L Taylor presents today for follow-up visit.  Since his last visit, he was scheduled for removal of SCS/ IPG. Patient has been doing well. He denies any fevers, postop swelling or drainage.  Patient reports pain as 2/10 today.  He presents today for a wound check.     Initial HPI (11/19/2024):  Claude L Taylor is a 89 y.o. male who presents to the clinic for the evaluation of lower back pain.   Patient reports that over the last 1 year he has noted significant pain over the IPG site of the spinal cord stimulator.  Patient denies any erythema or infection.  Patient denies any dehiscence of the incision.  Patient reports that spine is only painful when he leans against a chair or other objects with a back and presses against the IPG.  IPG was placed in 2010 and patient reports that the machine has not been operable since 2018.  Pain is currently rated a 3 out 10, but can increase to a 7/10 when he leans against the device.    Patient denies any fevers, chills, changes in gait, saddle anesthesia, or bowel and bladder incontinence.      Non-Pharmacologic Treatments:  Physical Therapy/Home Exercise: yes  Ice/Heat:yes  TENS: no  Acupuncture: no  Massage: yes  Chiropractic: no        Previous Pain Medications:  NSAIDs, Tylenol, muscle relaxers, neuropathics, opioids, topicals       report:  Reviewed and consistent with medication use as prescribed.    Pain Procedures:   12/09/2024: SCS/IPG removal       Imaging:     XR CHEST PA AND LATERAL 12/06/2022     CLINICAL HISTORY:  Acute upper respiratory infection, unspecified     FINDINGS:  Comparison is made to August 1, 2022.     Left-sided cardiac  conduction device. Median sternotomy wires.  Spinal stimulator leads.  Right shoulder rotator cuff repair.  Cholecystectomy clips.     Mediastinal silhouette is within normal limits.  Mild increased parenchymal opacity bilaterally.  No pneumothorax or pleural effusion.     No acute osseous finding.  Potential lytic lesion at the inferior aspect of the posterior right 8th rib.     Impression:     1. Potential lytic lesion at the inferior aspect of the posterior right 8th rib.  Consider chest CT.  2. Mild increased parenchymal opacity bilaterally may reflect pulmonary edema or an atypical infectious/inflammatory process.    Past Medical History:   Diagnosis Date    *Atrial fibrillation     pacer Dr Barrios    Anticoagulant long-term use     Cancer     skin    Cervical neck pain with evidence of disc disease     Colon polyps 12/14/2015    Coronary artery disease     Depression     Diverticulosis     Hypertension     Kidney stone     Skin cancer      Past Surgical History:   Procedure Laterality Date    back fusion      CARDIAC PACEMAKER PLACEMENT      CARDIAC VALVE REPLACEMENT      CHOLECYSTECTOMY      COLONOSCOPY  03/22/2013    COLONOSCOPY N/A 12/14/2015    Procedure: COLONOSCOPY;  Surgeon: Gonzalo Gorman MD;  Location: [facility] ENDO;  Service: Endoscopy;  Laterality: N/A;    CORONARY ARTERY BYPASS GRAFT      ELBOW BURSA SURGERY      left    EYE SURGERY      FOOT SURGERY      MITRAL VALVE REPLACEMENT      REPLACEMENT OF PACEMAKER  09/2019    SHOULDER SURGERY      SKIN CANCER EXCISION  01/2018/   04/2018 Dr.Thorla SPRINGER Derm    lower left leg     SPINAL CORD STIMULATOR REMOVAL N/A 12/9/2024    Procedure: REMOVAL, NEUROSTIMULATOR, SPINAL;  Surgeon: Moises Rosa MD;  Location: [facility] OR;  Service: Pain Management;  Laterality: N/A;    SPINE SURGERY  3 surgeries    TONSILLECTOMY       Social History     Socioeconomic History    Marital status:      Spouse name: marguerite Barron   Tobacco Use    Smoking status: Never     "Smokeless tobacco: Never    Tobacco comments:     never a smoker   Substance and Sexual Activity    Alcohol use: Not Currently     Alcohol/week: 2.0 standard drinks of alcohol     Types: 1 Glasses of wine, 1 Cans of beer per week     Comment: drinks above only with meals ocassionaly    Drug use: Never    Sexual activity: Not Currently     Partners: Female     Comment: too old at 89     Social Drivers of Health     Financial Resource Strain: Low Risk  (11/12/2024)    Overall Financial Resource Strain (CARDIA)     Difficulty of Paying Living Expenses: Not hard at all   Food Insecurity: No Food Insecurity (11/12/2024)    Hunger Vital Sign     Worried About Running Out of Food in the Last Year: Never true     Ran Out of Food in the Last Year: Never true   Physical Activity: Insufficiently Active (11/12/2024)    Exercise Vital Sign     Days of Exercise per Week: 3 days     Minutes of Exercise per Session: 10 min   Housing Stability: Unknown (11/12/2024)    Housing Stability Vital Sign     Unable to Pay for Housing in the Last Year: No     Family History   Problem Relation Name Age of Onset    Cancer Father Claude Lee Taylor     Arthritis Father Claude Lee Taylor     Colon cancer Neg Hx         Review of patient's allergies indicates:   Allergen Reactions    Oxycodone      "it's mental/emotional       Current Outpatient Medications   Medication Sig    celecoxib (CELEBREX) 50 MG capsule Take 1 capsule (50 mg total) by mouth 2 (two) times daily.    chlorhexidine (HIBICLENS) 4 % external liquid Apply topically once daily. Please wash lower back with soap night before procedure    fluorometholone 0.1% (FML) 0.1 % DrpS Place 1 drop in both eyes twice daily (Patient not taking: Reported on 10/7/2024)    furosemide (LASIX) 20 MG tablet Take 1 tablet by mouth as needed.    HYDROcodone-acetaminophen (NORCO) 7.5-325 mg per tablet Take 1 tablet by mouth every 8 (eight) hours as needed for Pain.    QUEtiapine (SEROQUEL) 25 MG Tab " "Take 1 tablet (25 mg total) by mouth nightly as needed (sleep).    sulfamethoxazole-trimethoprim 800-160mg (BACTRIM DS) 800-160 mg Tab Take 1 tablet by mouth once daily. Please start antibiotic day after procedure.  Take 1 pill daily with meals    tamsulosin (FLOMAX) 0.4 mg Cap Take 2 capsules (0.8 mg total) by mouth every evening for prostate and urine flow    triamcinolone acetonide 0.025% (KENALOG) 0.025 % cream Apply topically 2 (two) times daily.    warfarin (COUMADIN) 5 MG tablet TAKE 1 TABLET BY MOUTH ONCE DAILY OR  AS  DIRECTED  BY  COUMADIN  CLINIC     No current facility-administered medications for this visit.         ROS  Review of Systems   Constitutional:  Negative for activity change, appetite change and fever.   HENT:  Negative for sore throat.    Respiratory:  Negative for cough, chest tightness, shortness of breath, wheezing and stridor.    Cardiovascular:  Negative for chest pain, palpitations and leg swelling.   Gastrointestinal:  Negative for abdominal pain, blood in stool, diarrhea, nausea and vomiting.   Endocrine: Negative for polydipsia, polyphagia and polyuria.   Genitourinary:  Negative for dysuria and hematuria.   Musculoskeletal:  Positive for arthralgias, back pain and gait problem. Negative for neck pain and neck stiffness.   Skin:  Negative for rash.   Allergic/Immunologic: Negative for food allergies.   Neurological:  Negative for dizziness, tremors, seizures, syncope, facial asymmetry, speech difficulty and light-headedness.   Psychiatric/Behavioral:  Negative for agitation, hallucinations, self-injury and suicidal ideas. The patient is not nervous/anxious and is not hyperactive.             OBJECTIVE:  BP (!) 153/79   Pulse 62   Resp 17   Ht 5' 9" (1.753 m)   Wt 73.5 kg (162 lb 2.4 oz)   BMI 23.95 kg/m²         Physical Exam  Constitutional:       General: He is not in acute distress.     Appearance: Normal appearance. He is not ill-appearing.   HENT:      Head: Normocephalic " and atraumatic.      Nose: No congestion or rhinorrhea.   Eyes:      Extraocular Movements: Extraocular movements intact.      Pupils: Pupils are equal, round, and reactive to light.   Cardiovascular:      Pulses: Normal pulses.   Pulmonary:      Effort: Pulmonary effort is normal.   Skin:     General: Skin is warm and dry.      Capillary Refill: Capillary refill takes less than 2 seconds.   Neurological:      General: No focal deficit present.      Mental Status: He is alert and oriented to person, place, and time.      Sensory: No sensory deficit.   Psychiatric:         Mood and Affect: Mood normal.         Behavior: Behavior normal.         Thought Content: Thought content normal.         Musculoskeletal:  Lumbar Exam  Incision: no  Pain with Flexion: no  Pain with Extension: No  ROM:  Decreased  Paraspinous TTP:  no TTP    Incisions: 2 total, 1 mid thoracic , 1 right buttock  IPG removal sites appear clean, dry and healing well. There is no swelling, erythema or active drainage.    Sites were cleaned. Small amount of Dermabond placed over incisions followed by a clean dressing.    LABS:  Lab Results   Component Value Date    WBC 6.50 03/30/2023    HGB 12.8 (L) 03/30/2023    HCT 39.2 (L) 03/30/2023    MCV 93 03/30/2023     03/30/2023       CMP  Sodium   Date Value Ref Range Status   03/30/2023 138 136 - 145 mmol/L Final     Potassium   Date Value Ref Range Status   03/30/2023 5.1 3.5 - 5.1 mmol/L Final     Chloride   Date Value Ref Range Status   03/30/2023 107 95 - 110 mmol/L Final     CO2   Date Value Ref Range Status   03/30/2023 25 23 - 29 mmol/L Final     Glucose   Date Value Ref Range Status   03/30/2023 94 70 - 110 mg/dL Final     BUN   Date Value Ref Range Status   03/30/2023 9 (L) 10 - 30 mg/dL Final     Creatinine   Date Value Ref Range Status   03/30/2023 0.8 0.5 - 1.4 mg/dL Final     Calcium   Date Value Ref Range Status   03/30/2023 8.9 8.7 - 10.5 mg/dL Final     Total Protein   Date Value Ref  Range Status   03/30/2023 6.6 6.0 - 8.4 g/dL Final     Albumin   Date Value Ref Range Status   03/30/2023 3.7 3.5 - 5.2 g/dL Final     Total Bilirubin   Date Value Ref Range Status   03/30/2023 1.4 (H) 0.1 - 1.0 mg/dL Final     Comment:     For infants and newborns, interpretation of results should be based  on gestational age, weight and in agreement with clinical  observations.    Premature Infant recommended reference ranges:  Up to 24 hours.............<8.0 mg/dL  Up to 48 hours............<12.0 mg/dL  3-5 days..................<15.0 mg/dL  6-29 days.................<15.0 mg/dL       Alkaline Phosphatase   Date Value Ref Range Status   03/30/2023 83 55 - 135 U/L Final     AST   Date Value Ref Range Status   03/30/2023 20 10 - 40 U/L Final     ALT   Date Value Ref Range Status   03/30/2023 9 (L) 10 - 44 U/L Final     Anion Gap   Date Value Ref Range Status   03/30/2023 6 (L) 8 - 16 mmol/L Final     eGFR if    Date Value Ref Range Status   02/08/2022 >60.0 >60 mL/min/1.73 m^2 Final     eGFR if non    Date Value Ref Range Status   02/08/2022 >60.0 >60 mL/min/1.73 m^2 Final     Comment:     Calculation used to obtain the estimated glomerular filtration  rate (eGFR) is the CKD-EPI equation.            ASSESSMENT:       89 y.o. year old male with lower back pain, consistent with     1. Spinal cord stimulator status        2. Encounter for postoperative wound check            Spinal cord stimulator status    Encounter for postoperative wound check         PLAN:   - Interventions:   - Patient is s/p removal of IPG of spinal cord stimulator as patient reported significant pain when he leans against objects.      - Anticoagulation use:   Yes Coumadin, with pacemaker and prosthetic mitral valve    - Medications:  - patient has completed Bactrim and utilized  Hibiclens. He did not take hydrocodone after procedure.     report:  Reviewed and consistent with medication use as  prescribed.      - Therapy:   - continue home exercises    - Imaging/Diagnostic:  Previous imaging (pertinent) reviewed.    - Consults:   - none at this time    -Other: Wound care: discussed wound care with patient. Instructed patient to change bandage daily for next 7 days (two incisions). He is able to shower tomorrow but to avoid submerging in water for next 7 days.       - Patient Questions: Answered all of the patient's questions regarding diagnosis, therapy, and treatment     This condition does not require this patient to take time off of work, and the primary goal of our Pain Management services is to improve the patient's functional capacity.     - Follow up visit: return to clinic 2 weeks for wound check or sooner if needed.        The above plan and management options were discussed at length with patient. Patient is in agreement with the above and verbalized understanding.    I discussed the goals of interventional chronic pain management with the patient on today's visit.  I explained the utility of injections for diagnostic and therapeutic purposes.  We discussed a multimodal approach to pain including treating the patient's given worst pain at any given time.  We will use a systematic approach to addressing pain.  We will also adopt a multimodal approach that includes injections, adjuvant medications, physical therapy, at times psychiatry.  There may be a limited role for opioid use intermittently in the treatment of pain, more particularly for acute pain although no one approach can be used as a sole treatment modality.    I emphasized the importance of regular exercise, core strengthening and stretching, diet and weight loss as a cornerstone of long-term pain management.      Buck Mason PA-C  Interventional Pain Management  [facility]    Future Appointments   Date Time Provider Department Center   1/28/2025 10:00 AM Hima Cannon MD [facility]

## 2025-01-28 ENCOUNTER — OFFICE VISIT (OUTPATIENT)
Dept: PRIMARY CARE CLINIC | Facility: CLINIC | Age: OVER 89
End: 2025-01-28
Payer: MEDICARE

## 2025-01-28 VITALS
DIASTOLIC BLOOD PRESSURE: 62 MMHG | SYSTOLIC BLOOD PRESSURE: 122 MMHG | BODY MASS INDEX: 23.45 KG/M2 | HEIGHT: 69 IN | HEART RATE: 58 BPM | WEIGHT: 158.31 LBS | OXYGEN SATURATION: 96 %

## 2025-01-28 DIAGNOSIS — L02.212 ABSCESS OF LOWER BACK: Primary | ICD-10-CM

## 2025-01-28 DIAGNOSIS — R39.11 BENIGN PROSTATIC HYPERPLASIA WITH URINARY HESITANCY: ICD-10-CM

## 2025-01-28 DIAGNOSIS — N40.1 BENIGN PROSTATIC HYPERPLASIA WITH URINARY HESITANCY: ICD-10-CM

## 2025-01-28 PROCEDURE — 87070 CULTURE OTHR SPECIMN AEROBIC: CPT | Performed by: FAMILY MEDICINE

## 2025-01-28 PROCEDURE — 87077 CULTURE AEROBIC IDENTIFY: CPT | Performed by: FAMILY MEDICINE

## 2025-01-28 PROCEDURE — 99499 UNLISTED E&M SERVICE: CPT | Mod: S$GLB,,, | Performed by: FAMILY MEDICINE

## 2025-01-28 PROCEDURE — 99999 PR PBB SHADOW E&M-EST. PATIENT-LVL V: CPT | Mod: PBBFAC,,, | Performed by: FAMILY MEDICINE

## 2025-01-28 PROCEDURE — 87186 SC STD MICRODIL/AGAR DIL: CPT | Performed by: FAMILY MEDICINE

## 2025-01-28 RX ORDER — SULFAMETHOXAZOLE AND TRIMETHOPRIM 800; 160 MG/1; MG/1
1 TABLET ORAL 2 TIMES DAILY
Qty: 20 TABLET | Refills: 0 | Status: SHIPPED | OUTPATIENT
Start: 2025-01-28 | End: 2025-02-07

## 2025-01-28 RX ORDER — FINASTERIDE 5 MG/1
5 TABLET, FILM COATED ORAL DAILY
Qty: 30 TABLET | Refills: 11 | Status: SHIPPED | OUTPATIENT
Start: 2025-01-28 | End: 2026-01-28

## 2025-01-28 RX ORDER — PREDNISOLONE ACETATE 10 MG/ML
SUSPENSION/ DROPS OPHTHALMIC
COMMUNITY
Start: 2025-01-10

## 2025-01-28 RX ORDER — MUPIROCIN 20 MG/G
OINTMENT TOPICAL 2 TIMES DAILY
Qty: 30 G | Refills: 0 | Status: SHIPPED | OUTPATIENT
Start: 2025-01-28 | End: 2025-02-07

## 2025-01-28 NOTE — PATIENT INSTRUCTIONS
The spot on your back does look like a small abscess.    I am sending a culture of some of the pus to see which bacteria grow.  Let us go ahead and start treating it with some Bactrim (trimethoprim-sulfamethoxazole) for the next 10 days.  This should help heal it from the inside.      Apply a warm compress to the area 2-3 times per day, 5-10 minutes at a time.  After the warm compress, go ahead and apply pressure to squeeze out some of the pus.  Then, clean the area gently with Dial soap and water.  Rinse thoroughly, pat dry.  Apply a small amount of Bactroban (mupirocin) ointment) then cover with a Band-Aid.    If we do not see significant improvement in the next week, then I would like you to see one of our surgeons to have it opened up and drained.  Referral placed.      With the urinary issues, let us try adding some finasteride to the tamsulosin.  Take this once daily.  If you do not see significant improvement in the next two weeks, then I would like you to follow up with the urologist to get another opinion and to discuss other options.    For some of the leg cramps, pains at night, try supplementing with some OTC magnesium glycinate.  200 mg at bedtime should help.

## 2025-01-31 LAB — BACTERIA SPEC AEROBE CULT: ABNORMAL

## 2025-02-04 ENCOUNTER — TELEPHONE (OUTPATIENT)
Dept: UROLOGY | Facility: CLINIC | Age: OVER 89
End: 2025-02-04
Payer: MEDICARE

## 2025-02-04 NOTE — TELEPHONE ENCOUNTER
.Outgoing call placed to patient, patient verified name and date of birth, patient stated he wanted to cancel his scheduled about appointment with Dr. Cleaning since his PCP prescribed him some medication that has gotten rid of his problem, confirmed patient wanted to cancel his appointment and he confirmed, no further assistance needed.

## 2025-03-26 DIAGNOSIS — G47.9 SLEEP DIFFICULTIES: ICD-10-CM

## 2025-03-26 RX ORDER — QUETIAPINE FUMARATE 25 MG/1
25 TABLET, FILM COATED ORAL NIGHTLY PRN
Qty: 90 TABLET | Refills: 3 | Status: SHIPPED | OUTPATIENT
Start: 2025-03-26

## 2025-03-26 NOTE — TELEPHONE ENCOUNTER
Care Due:                  Date            Visit Type   Department     Provider  --------------------------------------------------------------------------------                                EP -                              PRIMARY      [facility] PRIMARY  Last Visit: 01-      CARE ([facility])   MAILE Cannon  Next Visit: None Scheduled  None         None Found                                                            Last  Test          Frequency    Reason                     Performed    Due Date  --------------------------------------------------------------------------------    CBC.........  12 months..  celecoxib................  03- 03-    CMP.........  12 months..  celecoxib................  03- 03-    [facility] Embedded Care Due Messages. Reference number: [id number].   3/26/2025 9:05:37 AM CDT

## 2025-03-31 ENCOUNTER — OFFICE VISIT (OUTPATIENT)
Dept: PRIMARY CARE CLINIC | Facility: CLINIC | Age: OVER 89
End: 2025-03-31
Payer: MEDICARE

## 2025-03-31 VITALS
DIASTOLIC BLOOD PRESSURE: 62 MMHG | SYSTOLIC BLOOD PRESSURE: 122 MMHG | TEMPERATURE: 98 F | HEART RATE: 64 BPM | BODY MASS INDEX: 23.41 KG/M2 | WEIGHT: 158.06 LBS | OXYGEN SATURATION: 95 % | HEIGHT: 69 IN

## 2025-03-31 DIAGNOSIS — M79.605 LEG PAIN, ANTERIOR, LEFT: ICD-10-CM

## 2025-03-31 DIAGNOSIS — U07.1 COVID: Primary | ICD-10-CM

## 2025-03-31 DIAGNOSIS — I48.20 ATRIAL FIBRILLATION, CHRONIC: ICD-10-CM

## 2025-03-31 LAB
CTP QC/QA: YES
SARS-COV-2 RDRP RESP QL NAA+PROBE: POSITIVE

## 2025-03-31 PROCEDURE — 3288F FALL RISK ASSESSMENT DOCD: CPT | Mod: CPTII,S$GLB,, | Performed by: FAMILY MEDICINE

## 2025-03-31 PROCEDURE — 1159F MED LIST DOCD IN RCRD: CPT | Mod: CPTII,S$GLB,, | Performed by: FAMILY MEDICINE

## 2025-03-31 PROCEDURE — 99215 OFFICE O/P EST HI 40 MIN: CPT | Mod: S$GLB,,, | Performed by: FAMILY MEDICINE

## 2025-03-31 PROCEDURE — 1101F PT FALLS ASSESS-DOCD LE1/YR: CPT | Mod: CPTII,S$GLB,, | Performed by: FAMILY MEDICINE

## 2025-03-31 PROCEDURE — G2211 COMPLEX E/M VISIT ADD ON: HCPCS | Mod: S$GLB,,, | Performed by: FAMILY MEDICINE

## 2025-03-31 PROCEDURE — 99999 PR PBB SHADOW E&M-EST. PATIENT-LVL IV: CPT | Mod: PBBFAC,,, | Performed by: FAMILY MEDICINE

## 2025-03-31 PROCEDURE — 87635 SARS-COV-2 COVID-19 AMP PRB: CPT | Mod: QW,S$GLB,, | Performed by: FAMILY MEDICINE

## 2025-03-31 PROCEDURE — 1126F AMNT PAIN NOTED NONE PRSNT: CPT | Mod: CPTII,S$GLB,, | Performed by: FAMILY MEDICINE

## 2025-03-31 RX ORDER — PROMETHAZINE HYDROCHLORIDE AND DEXTROMETHORPHAN HYDROBROMIDE 6.25; 15 MG/5ML; MG/5ML
5 SYRUP ORAL EVERY 6 HOURS PRN
Qty: 120 ML | Refills: 0 | Status: SHIPPED | OUTPATIENT
Start: 2025-03-31 | End: 2025-04-10

## 2025-03-31 RX ORDER — TRAMADOL HYDROCHLORIDE 50 MG/1
50 TABLET ORAL EVERY 8 HOURS PRN
Qty: 30 TABLET | Refills: 0 | Status: SHIPPED | OUTPATIENT
Start: 2025-03-31

## 2025-03-31 NOTE — PATIENT INSTRUCTIONS
"Covid test was positive.    Let us go ahead and try treating you with Paxlovid.  This is a COVID antiviral that should help you feel better, more quickly.    Very important!  Do not take Seroquel (quetiapine) nor Flomax (tamsulosin) for the next five days while taking Paxlovid.  These are significant interactions and you should not take them concurrently.    Rest  Stay hydrated, drink plenty of fluids   Use OTC nasal saline spray ([place]'s, AYR, [employer],etc.) to clear nasal drainage and help with nasal congestion  Use an OTC antihistamine (Zyrtec or Claritin) to help dry mucus and post nasal drip  Use OTC Mucinex (plain blue box, no "letters") for sinus/chest congestion  Gargle with warm salt water for throat comfort (10-15 seconds, do NOT swallow!)  Use OTC zinc lozenges or OTC Cepacol lozenges (with benzocaine) for sore throat/cough  Alternate OTC Tylenol and/or Ibuprofen for fever, headache and body aches (do not take ibuprofen and Celebrex in the same day)    You can use my prescription cough medicine, as needed.      I am also sending a prescription for tramadol they you can use, as needed, for leg pain.    If your symptoms worsen, or if you develop any complications, go to the ER immediately for additional treatment.      "

## 2025-04-23 ENCOUNTER — OFFICE VISIT (OUTPATIENT)
Dept: SURGERY | Facility: CLINIC | Age: OVER 89
End: 2025-04-23
Payer: MEDICARE

## 2025-04-23 DIAGNOSIS — N40.1 BENIGN PROSTATIC HYPERPLASIA WITH WEAK URINARY STREAM: ICD-10-CM

## 2025-04-23 DIAGNOSIS — R39.12 BENIGN PROSTATIC HYPERPLASIA WITH WEAK URINARY STREAM: ICD-10-CM

## 2025-04-23 DIAGNOSIS — L02.212 BACK ABSCESS: Primary | ICD-10-CM

## 2025-04-23 PROCEDURE — 99203 OFFICE O/P NEW LOW 30 MIN: CPT | Mod: S$GLB,,, | Performed by: SURGERY

## 2025-04-23 PROCEDURE — 99999 PR PBB SHADOW E&M-EST. PATIENT-LVL I: CPT | Mod: PBBFAC,,, | Performed by: SURGERY

## 2025-04-23 RX ORDER — TAMSULOSIN HYDROCHLORIDE 0.4 MG/1
CAPSULE ORAL
Qty: 180 CAPSULE | Refills: 3 | Status: SHIPPED | OUTPATIENT
Start: 2025-04-23

## 2025-04-23 NOTE — TELEPHONE ENCOUNTER
Care Due:                  Date            Visit Type   Department     Provider  --------------------------------------------------------------------------------                                EP -                              PRIMARY      [facility] PRIMARY  Last Visit: 03-      CARE ([facility])   MAILE Cannon  Next Visit: None Scheduled  None         None Found                                                            Last  Test          Frequency    Reason                     Performed    Due Date  --------------------------------------------------------------------------------    CBC.........  12 months..  celecoxib................  Not Found    Overdue    CMP.........  12 months..  celecoxib................  Not Found    Overdue    [employer] Embedded Care Due Messages. Reference number: [id number].   4/23/2025 6:48:25 AM CDT

## 2025-04-23 NOTE — TELEPHONE ENCOUNTER
Provider Staff:  Action required for this patient    Requires labs      Please see care gap opportunities below in Care Due Message.    Thanks!  [facility]     Appointments      Date Provider   Last Visit   3/31/2025 Hima Cannon MD   Next Visit   Visit date not found Hima Cannon MD     Refill Decision Note   Claude Taylor  is requesting a refill authorization.  Brief Assessment and Rationale for Refill:  Approve     Medication Therapy Plan:         Comments:     Note composed:9:07 AM 04/23/2025

## 2025-04-28 ENCOUNTER — PATIENT MESSAGE (OUTPATIENT)
Dept: SURGERY | Facility: CLINIC | Age: OVER 89
End: 2025-04-28
Payer: MEDICARE

## 2025-05-01 ENCOUNTER — OFFICE VISIT (OUTPATIENT)
Dept: NEUROSURGERY | Facility: CLINIC | Age: OVER 89
End: 2025-05-01
Payer: MEDICARE

## 2025-05-01 VITALS — WEIGHT: 154.75 LBS | BODY MASS INDEX: 22.92 KG/M2 | HEIGHT: 69 IN

## 2025-05-01 DIAGNOSIS — T85.192S MALFUNCTION OF SPINAL CORD STIMULATOR, SEQUELA: ICD-10-CM

## 2025-05-01 DIAGNOSIS — M54.6 PAIN IN THORACIC SPINE: ICD-10-CM

## 2025-05-01 DIAGNOSIS — M54.9 CHRONIC BACK PAIN, UNSPECIFIED BACK LOCATION, UNSPECIFIED BACK PAIN LATERALITY: Primary | ICD-10-CM

## 2025-05-01 DIAGNOSIS — M54.10 BACK PAIN WITH RADICULOPATHY: Primary | ICD-10-CM

## 2025-05-01 DIAGNOSIS — G89.29 CHRONIC BACK PAIN, UNSPECIFIED BACK LOCATION, UNSPECIFIED BACK PAIN LATERALITY: Primary | ICD-10-CM

## 2025-05-01 DIAGNOSIS — T85.733S INFECTION OF SPINAL CORD STIMULATOR, SEQUELA: ICD-10-CM

## 2025-05-01 PROCEDURE — 1159F MED LIST DOCD IN RCRD: CPT | Mod: CPTII,S$GLB,, | Performed by: NEUROLOGICAL SURGERY

## 2025-05-01 PROCEDURE — 1126F AMNT PAIN NOTED NONE PRSNT: CPT | Mod: CPTII,S$GLB,, | Performed by: NEUROLOGICAL SURGERY

## 2025-05-01 PROCEDURE — 3288F FALL RISK ASSESSMENT DOCD: CPT | Mod: CPTII,S$GLB,, | Performed by: NEUROLOGICAL SURGERY

## 2025-05-01 PROCEDURE — 99999 PR PBB SHADOW E&M-EST. PATIENT-LVL III: CPT | Mod: PBBFAC,,, | Performed by: NEUROLOGICAL SURGERY

## 2025-05-01 PROCEDURE — 99204 OFFICE O/P NEW MOD 45 MIN: CPT | Mod: S$GLB,,, | Performed by: NEUROLOGICAL SURGERY

## 2025-05-01 PROCEDURE — 1101F PT FALLS ASSESS-DOCD LE1/YR: CPT | Mod: CPTII,S$GLB,, | Performed by: NEUROLOGICAL SURGERY

## 2025-05-01 NOTE — PROGRESS NOTES
Subjective:      Patient ID: Claude L Taylor is a 89 y.o. male.    Chief Complaint: Lumbar Spine Pain (L-Spine) (Pt reports for eval of abscess near SCS site; Pt reports abscess has been there over a year no relief with Abx; Pt had SCS removed 12/09/24 by Dr. Rosa; Pt denies any radiating pain; Pt has XR lumbar for review)    Pt is here today with his son. Ambulates with Rollator.   His son states he has an abscess on spine that he's had for about 1 year around mid back. Had SCS removed 12/9/24. Was told by Dr. Appiah that it could be an infection from a SCS lead that is still present. Area is point tender and has obvious fluid build up that leaks a yellowish color. Patient keeps it clean and covered.   Only feels pain when area is touching something such as sitting in a chair and laying in bed.   Meds = Tramadol PRN      Objective:       Physical Exam:  Nursing note and vitals reviewed.    Constitutional: He appears well-nourished. He is not diaphoretic. No distress.     Eyes: Pupils are equal, round, and reactive to light. EOM are normal.     Cardiovascular: Normal rate and regular rhythm.     Psych/Behavior: He is alert. He is oriented to person, place, and time. He has a normal mood and affect.     Musculoskeletal:        Back: Range of motion is limited. There is tenderness. Muscle strength is 5/5.       Right Lower Extremities: Range of motion is full. There is no tenderness. Muscle strength is 5/5. Tone is normal.        Left Lower Extremities: There is no tenderness. Muscle strength is 5/5. Tone is normal.     Neurological:        Sensory: There is no sensory deficit in the trunk. There is no sensory deficit in the extremities.        Cranial nerves: Cranial nerve(s) II, III, IV, V, VI, VII, VIII, IX, X, XI and XII are intact.     General    Nursing note and vitals reviewed.  Constitutional: He is oriented to person, place, and time. He appears well-nourished. No distress.   Eyes: EOM are normal. Pupils  are equal, round, and reactive to light.   Cardiovascular:  Normal rate and regular rhythm.            Neurological: He is alert and oriented to person, place, and time.   Psychiatric: He has a normal mood and affect.             Ortho Exam    Patient with posterior wound where pocket of fluctuance scant drainage  Approximating the site of the spinal cord stimulator placement    No results found for this or any previous visit.     No results found for this or any previous visit.    Results for orders placed during the hospital encounter of 12/09/24    X-Ray Lumbar Spine Ap And Lateral    Narrative  EXAMINATION:  XR LUMBAR SPINE AP AND LATERAL    CLINICAL HISTORY:  intraop;    COMPARISON:  None    Impression  FINDINGS/  Intraoperative fluoroscopic images were obtained.    Total fluoro time was 1 minute 32 s and 10 fluoroscopic images were obtained.  See operative report.      Electronically signed by: James Finn MD  Date:    12/09/2024  Time:    15:19         I  reviewed all pertinent imaging regarding this case.  Assessment:     1. Back pain with radiculopathy    2. Malfunction of spinal cord stimulator, sequela    3. Infection of spinal cord stimulator, sequela      Plan:     Back pain with radiculopathy    Malfunction of spinal cord stimulator, sequela    Infection of spinal cord stimulator, sequela    Pt with chronic non healing wound adjacent to his prior SCS lead site  He had his SCS battery removed along with distal lead   His paddle is cut next to spinous process   Need imaging of the T spine with and without contrast to better evaluate the anatomy   He may need wound exploration and debridement   Will follow up after imaging completed        Thank you for the referral   Please call with any questions    Zach Winslow MD  Neurosurgery     Disclaimer: This note was prepared using a voice recognition system and is likely to have sound alike errors within the text.

## 2025-05-02 ENCOUNTER — LAB VISIT (OUTPATIENT)
Dept: LAB | Facility: HOSPITAL | Age: OVER 89
End: 2025-05-02
Attending: NEUROLOGICAL SURGERY
Payer: MEDICARE

## 2025-05-02 DIAGNOSIS — G89.29 CHRONIC BACK PAIN, UNSPECIFIED BACK LOCATION, UNSPECIFIED BACK PAIN LATERALITY: ICD-10-CM

## 2025-05-02 DIAGNOSIS — M54.6 PAIN IN THORACIC SPINE: ICD-10-CM

## 2025-05-02 DIAGNOSIS — M54.9 CHRONIC BACK PAIN, UNSPECIFIED BACK LOCATION, UNSPECIFIED BACK PAIN LATERALITY: ICD-10-CM

## 2025-05-02 LAB
CREAT SERPL-MCNC: 0.7 MG/DL (ref 0.5–1.4)
GFR SERPLBLD CREATININE-BSD FMLA CKD-EPI: >60 ML/MIN/1.73/M2

## 2025-05-02 PROCEDURE — 36415 COLL VENOUS BLD VENIPUNCTURE: CPT | Mod: PN

## 2025-05-02 PROCEDURE — 82565 ASSAY OF CREATININE: CPT

## 2025-05-06 ENCOUNTER — HOSPITAL ENCOUNTER (OUTPATIENT)
Dept: RADIOLOGY | Facility: HOSPITAL | Age: OVER 89
Discharge: HOME | End: 2025-05-06
Attending: NEUROLOGICAL SURGERY
Payer: MEDICARE

## 2025-05-06 DIAGNOSIS — G89.29 CHRONIC BACK PAIN, UNSPECIFIED BACK LOCATION, UNSPECIFIED BACK PAIN LATERALITY: ICD-10-CM

## 2025-05-06 DIAGNOSIS — M54.9 CHRONIC BACK PAIN, UNSPECIFIED BACK LOCATION, UNSPECIFIED BACK PAIN LATERALITY: ICD-10-CM

## 2025-05-06 DIAGNOSIS — M54.6 PAIN IN THORACIC SPINE: ICD-10-CM

## 2025-05-06 PROCEDURE — 72133 CT LUMBAR SPINE W/O & W/DYE: CPT | Mod: TC,PN

## 2025-05-06 PROCEDURE — 72133 CT LUMBAR SPINE W/O & W/DYE: CPT | Mod: 26,,, | Performed by: RADIOLOGY

## 2025-05-06 PROCEDURE — 25500020 PHARM REV CODE 255: Mod: PN | Performed by: NEUROLOGICAL SURGERY

## 2025-05-06 PROCEDURE — 72130 CT CHEST SPINE W/O & W/DYE: CPT | Mod: TC,PN

## 2025-05-06 PROCEDURE — 72130 CT CHEST SPINE W/O & W/DYE: CPT | Mod: 26,,, | Performed by: RADIOLOGY

## 2025-05-06 RX ADMIN — IOHEXOL 75 ML: 350 INJECTION, SOLUTION INTRAVENOUS at 09:05

## 2025-05-07 NOTE — PROGRESS NOTES
"History & Physical    Subjective     History of Present Illness:  Patient is a 89 y.o. male referred for back abscess.  Patient reports having swelling and drainage from an area on his back over the past few months.  He underwent spinal stimulator removal 12/24 and has had multiple back surgeries.    Chief Complaint   Patient presents with    Consult     abscess       Review of patient's allergies indicates:   Allergen Reactions    Oxycodone Hallucinations     "it's mental/emotional       Current Medications[1]    Past Medical History:   Diagnosis Date    *Atrial fibrillation     pacer Dr Barrois    Anticoagulant long-term use     Cancer     skin    Cervical neck pain with evidence of disc disease     Colon polyps 12/14/2015    Coronary artery disease     Depression     Diverticulosis     Hypertension     Kidney stone     Skin cancer      Past Surgical History:   Procedure Laterality Date    back fusion      CARDIAC PACEMAKER PLACEMENT      CARDIAC VALVE REPLACEMENT      CHOLECYSTECTOMY      COLONOSCOPY  03/22/2013    COLONOSCOPY N/A 12/14/2015    Procedure: COLONOSCOPY;  Surgeon: Gonzalo Gorman MD;  Location: [facility] ENDO;  Service: Endoscopy;  Laterality: N/A;    CORONARY ARTERY BYPASS GRAFT      ELBOW BURSA SURGERY      left    EYE SURGERY      FOOT SURGERY      MITRAL VALVE REPLACEMENT      REPLACEMENT OF PACEMAKER  09/2019    SHOULDER SURGERY      SKIN CANCER EXCISION  01/2018/   04/2018  [place] [facility]    lower left leg     SPINAL CORD STIMULATOR REMOVAL N/A 12/9/2024    Procedure: REMOVAL, NEUROSTIMULATOR, SPINAL;  Surgeon: Moises Rosa MD;  Location: [facility] OR;  Service: Pain Management;  Laterality: N/A;    SPINE SURGERY  3 surgeries    TONSILLECTOMY       Family History   Problem Relation Name Age of Onset    Cancer Father Claude Lee Taylor     Arthritis Father Claude Lee Taylor     Colon cancer Neg Hx       Social History[2]     Review of Systems:  Review of Systems   Constitutional:  Negative for chills, " fever and unexpected weight change.   HENT:  Negative for congestion.    Eyes:  Negative for visual disturbance.   Respiratory:  Negative for shortness of breath.    Cardiovascular:  Negative for chest pain.   Gastrointestinal:  Negative for abdominal distention, abdominal pain, constipation, nausea, rectal pain and vomiting.   Genitourinary:  Negative for dysuria.   Musculoskeletal:  Negative for arthralgias.   Skin:  Positive for color change and wound. Negative for rash.   Neurological:  Negative for light-headedness.   Hematological:  Negative for adenopathy.          Objective     Vital Signs (Most Recent)              Physical Exam:  Physical Exam  Vitals reviewed.   Constitutional:       Appearance: He is well-developed.   HENT:      Head: Normocephalic and atraumatic.   Cardiovascular:      Rate and Rhythm: Normal rate.   Pulmonary:      Effort: Pulmonary effort is normal.   Musculoskeletal:      Cervical back: Normal range of motion and neck supple.   Skin:     General: Skin is warm and dry.          Neurological:      Mental Status: He is alert and oriented to person, place, and time.                Assessment and Plan     89-year-old male with back abscess    PLAN:    Chronic back abscess/nonhealing wound which has been ongoing since last surgery 12/24 when he underwent nerve stimulator removal.  Reviewed patient's imaging and op reports noting remaining hardware from numerous procedures  We will reach out to pain management to further discuss may require evaluation with neurosurgery/spine due to chronic infection and underlying hardware              [1]   Current Outpatient Medications   Medication Sig Dispense Refill    celecoxib (CELEBREX) 50 MG capsule Take 1 capsule (50 mg total) by mouth 2 (two) times daily. 180 capsule 3    chlorhexidine (HIBICLENS) 4 % external liquid Apply topically once daily. Please wash lower back with soap night before procedure 118 mL 0    finasteride (PROSCAR) 5 mg tablet  Take 1 tablet (5 mg total) by mouth once daily. 30 tablet 11    fluorometholone 0.1% (FML) 0.1 % DrpS Place 1 drop in both eyes twice daily 5 mL 5    furosemide (LASIX) 20 MG tablet Take 1 tablet by mouth as needed. 180 tablet 3    prednisoLONE acetate (PRED FORTE) 1 % DrpS INSTILL 1 DROP INTO BOTH EYES TWICE A DAY FOR 2 WEEKS THEN 1 DROP INTO BOTH EYES ONCE A DAY      QUEtiapine (SEROQUEL) 25 MG Tab TAKE 1 TABLET BY MOUTH NIGHTLY AS NEEDED FOR SLEEP 90 tablet 3    tamsulosin (FLOMAX) 0.4 mg Cap TAKE 2 CAPSULES BY MOUTH IN THE EVENING FOR PROSTATE AND  URINE  FLOW 180 capsule 3    traMADoL (ULTRAM) 50 mg tablet Take 1 tablet (50 mg total) by mouth every 8 (eight) hours as needed for Pain. 30 tablet 0    triamcinolone acetonide 0.025% (KENALOG) 0.025 % cream Apply topically 2 (two) times daily.      warfarin (COUMADIN) 5 MG tablet TAKE 1 TABLET BY MOUTH ONCE DAILY AS DIRECTED BY  COUMADIN  CLINIC 90 tablet 3     No current facility-administered medications for this visit.   [2]   Social History  Tobacco Use    Smoking status: Never    Smokeless tobacco: Never    Tobacco comments:     never a smoker   Substance Use Topics    Alcohol use: Not Currently     Alcohol/week: 2.0 standard drinks of alcohol     Types: 1 Glasses of wine, 1 Cans of beer per week     Comment: drinks above only with meals ocassionaly    Drug use: Never

## 2025-05-15 ENCOUNTER — OFFICE VISIT (OUTPATIENT)
Dept: NEUROSURGERY | Facility: CLINIC | Age: OVER 89
End: 2025-05-15
Payer: MEDICARE

## 2025-05-15 VITALS
WEIGHT: 154.75 LBS | HEIGHT: 69 IN | HEART RATE: 63 BPM | BODY MASS INDEX: 22.92 KG/M2 | SYSTOLIC BLOOD PRESSURE: 142 MMHG | DIASTOLIC BLOOD PRESSURE: 70 MMHG

## 2025-05-15 DIAGNOSIS — T85.733S INFECTION OF SPINAL CORD STIMULATOR, SEQUELA: ICD-10-CM

## 2025-05-15 DIAGNOSIS — M54.9 CHRONIC BACK PAIN, UNSPECIFIED BACK LOCATION, UNSPECIFIED BACK PAIN LATERALITY: Primary | ICD-10-CM

## 2025-05-15 DIAGNOSIS — L89.156 PRESSURE INJURY OF DEEP TISSUE OF SACRAL REGION: ICD-10-CM

## 2025-05-15 DIAGNOSIS — G89.29 CHRONIC BACK PAIN, UNSPECIFIED BACK LOCATION, UNSPECIFIED BACK PAIN LATERALITY: Primary | ICD-10-CM

## 2025-05-15 PROCEDURE — 99999 PR PBB SHADOW E&M-EST. PATIENT-LVL III: CPT | Mod: PBBFAC,,, | Performed by: NEUROLOGICAL SURGERY

## 2025-05-15 PROCEDURE — 1125F AMNT PAIN NOTED PAIN PRSNT: CPT | Mod: CPTII,S$GLB,, | Performed by: NEUROLOGICAL SURGERY

## 2025-05-15 PROCEDURE — 1159F MED LIST DOCD IN RCRD: CPT | Mod: CPTII,S$GLB,, | Performed by: NEUROLOGICAL SURGERY

## 2025-05-15 PROCEDURE — 99214 OFFICE O/P EST MOD 30 MIN: CPT | Mod: S$GLB,,, | Performed by: NEUROLOGICAL SURGERY

## 2025-05-15 NOTE — PROGRESS NOTES
Subjective:      Patient ID: Claude L Taylor is a 89 y.o. male.    Chief Complaint: Back pain with radiculopathy (Pt reports for f/u of abscess near SCS site; Pt reports abscess has been there over a year no relief with Abx; Pt had SCS removed 12/09/24 by Dr. Rosa; Pt denies any radiating pain; Pt would also like assistance with ref for possible pressure wound on tailbone; Pt has CT thoracic/ lumbar for review today)    Pt is here MRI Thoracic/Lumbar for review due to chronic non healing wound adjacent to his prior SCS lead site. States he is currently having discomfort/pressure around tailbone.  Pain = 8/10    Previous HPI:  Pt is here today with his son. Ambulates with Rollator.   His son states he has an abscess on spine that he's had for about 1 year around mid back. Had SCS removed 12/9/24. Was told by Dr. Appiah that it could be an infection from a SCS lead that is still present. Area is point tender and has obvious fluid build up that leaks a yellowish color. Patient keeps it clean and covered.   Only feels pain when area is touching something such as sitting in a chair and laying in bed.   Meds = Tramadol PRN      Objective:       Physical Exam:  Nursing note and vitals reviewed.    Constitutional: He appears well-nourished. He is not diaphoretic. No distress.     Eyes: Pupils are equal, round, and reactive to light. EOM are normal.     Cardiovascular: Normal rate and regular rhythm.     Psych/Behavior: He is alert. He is oriented to person, place, and time. He has a normal mood and affect.     Musculoskeletal:        Back: Range of motion is limited. There is tenderness. Muscle strength is 5/5.       Right Lower Extremities: Range of motion is full. There is no tenderness. Muscle strength is 5/5. Tone is normal.        Left Lower Extremities: There is no tenderness. Muscle strength is 5/5. Tone is normal.     Neurological:        Sensory: There is no sensory deficit in the trunk. There is no sensory  deficit in the extremities.        Cranial nerves: Cranial nerve(s) II, III, IV, V, VI, VII, VIII, IX, X, XI and XII are intact.     General    Nursing note and vitals reviewed.  Constitutional: He is oriented to person, place, and time. He appears well-nourished. No distress.   Eyes: EOM are normal. Pupils are equal, round, and reactive to light.   Cardiovascular:  Normal rate and regular rhythm.            Neurological: He is alert and oriented to person, place, and time.   Psychiatric: He has a normal mood and affect.             Ortho Exam    Patient with posterior wound where pocket of fluctuance scant drainage  Approximating the site of the spinal cord stimulator placement    No results found for this or any previous visit.     No results found for this or any previous visit.    Results for orders placed during the hospital encounter of 12/09/24    X-Ray Lumbar Spine Ap And Lateral    Narrative  EXAMINATION:  XR LUMBAR SPINE AP AND LATERAL    CLINICAL HISTORY:  intraop;    COMPARISON:  None    Impression  FINDINGS/  Intraoperative fluoroscopic images were obtained.    Total fluoro time was 1 minute 32 s and 10 fluoroscopic images were obtained.  See operative report.      Electronically signed by: James Finn MD  Date:    12/09/2024  Time:    15:19      CT scan thoracic spine       1. No evidence for epidural or paraspinal abscess.  No abnormal paraspinal soft tissue enhancement.  2. No acute fracture of the thoracic spine.  3. 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  4. Degenerative grade 1 anterolisthesis of C7 on T1.     I  reviewed all pertinent imaging regarding this case.  Assessment:     1. Chronic back pain, unspecified back location, unspecified back pain laterality    2. Infection of spinal cord stimulator, sequela    3. Pressure injury of deep tissue of sacral region        Plan:     Chronic back pain, unspecified back location, unspecified back pain  laterality    Infection of spinal cord stimulator, sequela    Pressure injury of deep tissue of sacral region       Patient with chronic nonhealing wound to the thoracic spine where his spinal cord stimulator was removed   This is been draining for approximately 1 year without any improvement with antibiotics   No fevers chills nausea vomiting   CT scan does not show any posterior tracking to the spine without any evidence of epidural or paraspinous abscess     We discussed open debridement and washout possibly dissection to the proximal lead with primary closure of wound     In addition to this the patient has a skin tear in the sacral area which has been painful will make referral to wound care for the evaluation of this     In the interim I have consented him for a posterior wound exploration and debridement    The patient was informed of all benefits and potential risk of the operation including but not limited to:  Pain, infection, bleeding, coma, paralysis, death.  Cerebrospinal fluid leak, failure of any instrumentation, the need for additional procedures in the future. No guarantee was given that this procedure would alleviate all of the symptoms.     Consents signed today in the office Will call with surgical date in the near future    Thank you for the referral   Please call with any questions    Zach Winslow MD  Neurosurgery     Disclaimer: This note was prepared using a voice recognition system and is likely to have sound alike errors within the text.

## 2025-05-16 DIAGNOSIS — T85.733S INFECTION OF SPINAL CORD STIMULATOR, SEQUELA: Primary | ICD-10-CM

## 2025-05-26 ENCOUNTER — TELEPHONE (OUTPATIENT)
Dept: PREADMISSION TESTING | Facility: HOSPITAL | Age: OVER 89
End: 2025-05-26
Payer: MEDICARE

## 2025-05-26 DIAGNOSIS — Z00.00 ENCOUNTER FOR MEDICARE ANNUAL WELLNESS EXAM: ICD-10-CM

## 2025-05-26 NOTE — PRE-PROCEDURE INSTRUCTIONS
Pre op instructions reviewed with DAUGHTER over telephone, verbalized understanding.    Procedure Date: 6/2/25  Arrival Time:  TBD; We will call you after 2pm the day before your procedure with your arrival time.    Address:   [facility] (Off [facility], 2nd Building on the left)  [id number] [place] [address] [place]. [address]  >>>Please enter through front entrance Lobby of 1st floor to Registration desk<<<        !!!INSTRUCTIONS IMPORTANT!!!  NO FOOD, DRINK OR TOBACCO PRODUCTS AFTER MIDNIGHT THE NIGHT BEFORE SURGERY.     Do not eat after 12 midnight, Do not smoke or use chewing tobacco after 12 midnight!  OK to brush teeth, but no gum, candy, or mints!       Patients should stop full meals at midnight, but they can consume clear liquids up to 3 hours prior to scheduled arrival time.  Clear liquids include Gatorade, water, soda, black coffee, jello or tea (no milk or creamer), and clear juices.  Clear liquids do NOT include anything with pulp or food particles (Chicken broth, ice cream, yogurt etc.)      >>>MEDICATION INSTRUCTIONS<<<: Morning of Surgery, take small sip of water with ONLY these medications:  FINASTERIDE      *ADHD Medication: Stop taking ADHD/ADD medications 48 hrs prior to surgery, as this can affect the anesthesia used.     *Diabetic/ Prediabetic Patients: !!!If you take diabetic or weight loss medication, Do NOT take morning of surgery unless instructed by Doctor!!!  Metformin to be stopped 24 hrs prior to surgery.   Long Acting Insulin Instructions: HOLD the night before surgery unless instructed differently by Provider!  Ozempic/ Mounjaro/ Wegovy/ Trulicity/ Semaglutide injections or weight loss medication to be stopped 7 days prior to surgery.    If you take Ozempic/ Mounjaro / Wegovy / Trulicity / Semaglutide, any weight loss injections OR PHENTERMINE --->>> PLEASE LET US KNOW IMMEDIATELY, as these medications need to be stopped 7 days prior to surgery!      !!!STOP ALL Aspirins,  NSAIDS, WEIGHT LOSS INJECTIONS/PILLS, Herbal supplements, & Vitamins 7 DAYS BEFORE SURGERY!!!    ____  Avoid Alcoholic beverages 3 days prior to surgery, as it can thin the blood.  ____  NO Acrylic/fake nails or nail polish worn day of surgery (specifically hand/arm & foot surgeries).  ____  NO powder, lotions, deodorants, oils or cream on body.  ____  Remove all jewelry & piercings & foreign objects before arrival & leave at home.  ____  Remove Dentures, Hearing Aids & Contact Lens prior to surgery.  ____  Bring photo ID and insurance information to hospital (Leave Valuables at Home).  ____  If going home the same day, arrange for a ride home. You will not be able to drive for 24 hrs if Anesthesia was used.   ____  Females (ages 11-60): may need to give a urine sample the morning of surgery; please see Pre op Nurse prior to using the restroom.  ____  Males: Stop ED medications (Viagra, Cialis) 24 hrs prior to surgery.  ____  Wear clean, loose fitting clothing to allow for dressings/ bandages.      Bathing Instructions:    -Shower with anti-bacterial Soap (Hibiclens or Dial) the night before surgery and the morning of.   -Do not use Hibiclens on your face or genitals.   -Apply clean clothes after shower.  -Do not shave your face or body 2 days prior to surgery unless instructed otherwise by your Surgeon.  -Do not shave your pubic area 7 days prior to surgery (GYN PATIENTS)    [facility] Visitor/Ride Policy:  Only 2 adults allowed in pre op/recovery area during your procedure. You MUST HAVE A RIDE HOME from a responsible adult that you know and trust. Medical Transport, Uber or Lyft can ONLY be used if patient has a responsible adult to accompany them during ride home.       *Signs and symptoms of Infection Before or After Surgery:               !!!If you experience any fever, chills, nausea/ vomiting, foul odor/ excessive drainage from surgical site, flu-like symptoms, new wounds or cuts, PLEASE CALL THE SURGEON OFFICE  at [phone] or SEND MESSAGE THROUGH [facility] PORTAL!!!     *If you are running late the morning of surgery, please call the Hospital Surgery Dept @ [phone].     *Billing questions:  [phone]     Thank you,  -[facility] Pre Admit Dept.  [phone] or [phone]  M-F 7:30 am-4:00 pm (Closed Major Holidays)

## 2025-05-27 DIAGNOSIS — R79.1 ABNORMAL COAGULATION PROFILE: ICD-10-CM

## 2025-05-27 DIAGNOSIS — Z01.818 PRE-OP TESTING: Primary | ICD-10-CM

## 2025-05-29 ENCOUNTER — HOSPITAL ENCOUNTER (OUTPATIENT)
Dept: CARDIOLOGY | Facility: HOSPITAL | Age: OVER 89
Discharge: HOME | End: 2025-05-29
Attending: NEUROLOGICAL SURGERY
Payer: MEDICARE

## 2025-05-29 ENCOUNTER — HOSPITAL ENCOUNTER (OUTPATIENT)
Dept: RADIOLOGY | Facility: HOSPITAL | Age: OVER 89
Discharge: HOME | End: 2025-05-29
Attending: NEUROLOGICAL SURGERY
Payer: MEDICARE

## 2025-05-29 DIAGNOSIS — Z01.818 PRE-OP TESTING: ICD-10-CM

## 2025-05-29 LAB
OHS QRS DURATION: 170 MS
OHS QTC CALCULATION: 466 MS

## 2025-05-29 PROCEDURE — 71046 X-RAY EXAM CHEST 2 VIEWS: CPT | Mod: 26,,, | Performed by: RADIOLOGY

## 2025-05-29 PROCEDURE — 93010 ELECTROCARDIOGRAM REPORT: CPT | Mod: ,,, | Performed by: INTERNAL MEDICINE

## 2025-05-29 PROCEDURE — 93005 ELECTROCARDIOGRAM TRACING: CPT

## 2025-05-29 PROCEDURE — 71046 X-RAY EXAM CHEST 2 VIEWS: CPT | Mod: TC

## 2025-06-01 ENCOUNTER — ANESTHESIA EVENT (OUTPATIENT)
Dept: SURGERY | Facility: HOSPITAL | Age: OVER 89
End: 2025-06-01
Payer: MEDICARE

## 2025-06-02 ENCOUNTER — ANESTHESIA (OUTPATIENT)
Dept: SURGERY | Facility: HOSPITAL | Age: OVER 89
End: 2025-06-02
Payer: MEDICARE

## 2025-06-02 ENCOUNTER — HOSPITAL ENCOUNTER (OUTPATIENT)
Facility: HOSPITAL | Age: OVER 89
Discharge: HOME | End: 2025-06-02
Attending: NEUROLOGICAL SURGERY | Admitting: NEUROLOGICAL SURGERY
Payer: MEDICARE

## 2025-06-02 VITALS
BODY MASS INDEX: 21.24 KG/M2 | SYSTOLIC BLOOD PRESSURE: 145 MMHG | RESPIRATION RATE: 18 BRPM | WEIGHT: 160.25 LBS | TEMPERATURE: 98 F | HEART RATE: 60 BPM | HEIGHT: 73 IN | OXYGEN SATURATION: 93 % | DIASTOLIC BLOOD PRESSURE: 65 MMHG

## 2025-06-02 DIAGNOSIS — T85.733S INFECTION OF SPINAL CORD STIMULATOR, SEQUELA: Primary | ICD-10-CM

## 2025-06-02 LAB
GRAM STN SPEC: NORMAL
GRAM STN SPEC: NORMAL

## 2025-06-02 PROCEDURE — 63600175 PHARM REV CODE 636 W HCPCS: Performed by: NEUROLOGICAL SURGERY

## 2025-06-02 PROCEDURE — 22010 I&D P-SPINE C/T/CERV-THOR: CPT | Mod: ,,, | Performed by: NEUROLOGICAL SURGERY

## 2025-06-02 PROCEDURE — 25000003 PHARM REV CODE 250: Performed by: ANESTHESIOLOGY

## 2025-06-02 PROCEDURE — 88305 TISSUE EXAM BY PATHOLOGIST: CPT | Mod: TC | Performed by: NEUROLOGICAL SURGERY

## 2025-06-02 PROCEDURE — 37000008 HC ANESTHESIA 1ST 15 MINUTES: Performed by: NEUROLOGICAL SURGERY

## 2025-06-02 PROCEDURE — 87075 CULTR BACTERIA EXCEPT BLOOD: CPT | Performed by: NEUROLOGICAL SURGERY

## 2025-06-02 PROCEDURE — 36000705 HC OR TIME LEV I EA ADD 15 MIN: Performed by: NEUROLOGICAL SURGERY

## 2025-06-02 PROCEDURE — 25000003 PHARM REV CODE 250: Performed by: NEUROLOGICAL SURGERY

## 2025-06-02 PROCEDURE — 71000033 HC RECOVERY, INTIAL HOUR: Performed by: NEUROLOGICAL SURGERY

## 2025-06-02 PROCEDURE — 63600175 PHARM REV CODE 636 W HCPCS

## 2025-06-02 PROCEDURE — 87116 MYCOBACTERIA CULTURE: CPT | Performed by: NEUROLOGICAL SURGERY

## 2025-06-02 PROCEDURE — 63600175 PHARM REV CODE 636 W HCPCS: Mod: JZ,TB | Performed by: ANESTHESIOLOGY

## 2025-06-02 PROCEDURE — 87102 FUNGUS ISOLATION CULTURE: CPT | Performed by: NEUROLOGICAL SURGERY

## 2025-06-02 PROCEDURE — 25000003 PHARM REV CODE 250

## 2025-06-02 PROCEDURE — 87070 CULTURE OTHR SPECIMN AEROBIC: CPT | Performed by: NEUROLOGICAL SURGERY

## 2025-06-02 PROCEDURE — 37000009 HC ANESTHESIA EA ADD 15 MINS: Performed by: NEUROLOGICAL SURGERY

## 2025-06-02 PROCEDURE — 87206 SMEAR FLUORESCENT/ACID STAI: CPT | Performed by: NEUROLOGICAL SURGERY

## 2025-06-02 PROCEDURE — C1729 CATH, DRAINAGE: HCPCS | Performed by: NEUROLOGICAL SURGERY

## 2025-06-02 PROCEDURE — 87205 SMEAR GRAM STAIN: CPT | Performed by: NEUROLOGICAL SURGERY

## 2025-06-02 PROCEDURE — 36000704 HC OR TIME LEV I 1ST 15 MIN: Performed by: NEUROLOGICAL SURGERY

## 2025-06-02 PROCEDURE — 71000015 HC POSTOP RECOV 1ST HR: Performed by: NEUROLOGICAL SURGERY

## 2025-06-02 RX ORDER — LIDOCAINE HYDROCHLORIDE 20 MG/ML
INJECTION INTRAVENOUS
Status: DISCONTINUED | OUTPATIENT
Start: 2025-06-02 | End: 2025-06-02

## 2025-06-02 RX ORDER — ONDANSETRON HYDROCHLORIDE 2 MG/ML
INJECTION, SOLUTION INTRAVENOUS
Status: DISCONTINUED | OUTPATIENT
Start: 2025-06-02 | End: 2025-06-02

## 2025-06-02 RX ORDER — FENTANYL CITRATE 50 UG/ML
25 INJECTION, SOLUTION INTRAMUSCULAR; INTRAVENOUS EVERY 5 MIN PRN
Status: DISCONTINUED | OUTPATIENT
Start: 2025-06-02 | End: 2025-06-02 | Stop reason: HOSPADM

## 2025-06-02 RX ORDER — FENTANYL CITRATE 50 UG/ML
INJECTION, SOLUTION INTRAMUSCULAR; INTRAVENOUS
Status: DISCONTINUED | OUTPATIENT
Start: 2025-06-02 | End: 2025-06-02

## 2025-06-02 RX ORDER — ONDANSETRON HYDROCHLORIDE 2 MG/ML
4 INJECTION, SOLUTION INTRAVENOUS DAILY PRN
Status: DISCONTINUED | OUTPATIENT
Start: 2025-06-02 | End: 2025-06-02 | Stop reason: HOSPADM

## 2025-06-02 RX ORDER — BACITRACIN ZINC 500 UNIT/G
OINTMENT (GRAM) TOPICAL
Status: DISCONTINUED | OUTPATIENT
Start: 2025-06-02 | End: 2025-06-02 | Stop reason: HOSPADM

## 2025-06-02 RX ORDER — KETOROLAC TROMETHAMINE 30 MG/ML
15 INJECTION, SOLUTION INTRAMUSCULAR; INTRAVENOUS EVERY 8 HOURS PRN
Status: DISCONTINUED | OUTPATIENT
Start: 2025-06-02 | End: 2025-06-02 | Stop reason: HOSPADM

## 2025-06-02 RX ORDER — METHOCARBAMOL 750 MG/1
750 TABLET, FILM COATED ORAL 3 TIMES DAILY PRN
Qty: 60 TABLET | Refills: 0 | Status: SHIPPED | OUTPATIENT
Start: 2025-06-02

## 2025-06-02 RX ORDER — HYDROCODONE BITARTRATE AND ACETAMINOPHEN 5; 325 MG/1; MG/1
1 TABLET ORAL EVERY 6 HOURS PRN
Qty: 28 TABLET | Refills: 0 | Status: SHIPPED | OUTPATIENT
Start: 2025-06-02 | End: 2025-06-09

## 2025-06-02 RX ORDER — VANCOMYCIN HYDROCHLORIDE 1 G/20ML
INJECTION, POWDER, LYOPHILIZED, FOR SOLUTION INTRAVENOUS
Status: DISCONTINUED | OUTPATIENT
Start: 2025-06-02 | End: 2025-06-02 | Stop reason: HOSPADM

## 2025-06-02 RX ORDER — PROPOFOL 10 MG/ML
VIAL (ML) INTRAVENOUS
Status: DISCONTINUED | OUTPATIENT
Start: 2025-06-02 | End: 2025-06-02

## 2025-06-02 RX ORDER — ROCURONIUM BROMIDE 10 MG/ML
INJECTION, SOLUTION INTRAVENOUS
Status: DISCONTINUED | OUTPATIENT
Start: 2025-06-02 | End: 2025-06-02

## 2025-06-02 RX ORDER — HYDROCODONE BITARTRATE AND ACETAMINOPHEN 5; 325 MG/1; MG/1
1 TABLET ORAL EVERY 6 HOURS PRN
Refills: 0 | Status: DISCONTINUED | OUTPATIENT
Start: 2025-06-02 | End: 2025-06-02 | Stop reason: HOSPADM

## 2025-06-02 RX ADMIN — HYDROCODONE BITARTRATE AND ACETAMINOPHEN 1 TABLET: 5; 325 TABLET ORAL at 08:06

## 2025-06-02 RX ADMIN — SUGAMMADEX 200 MG: 100 INJECTION, SOLUTION INTRAVENOUS at 08:06

## 2025-06-02 RX ADMIN — ONDANSETRON 4 MG: 2 INJECTION INTRAMUSCULAR; INTRAVENOUS at 07:06

## 2025-06-02 RX ADMIN — ROCURONIUM BROMIDE 50 MG: 10 SOLUTION INTRAVENOUS at 07:06

## 2025-06-02 RX ADMIN — PROPOFOL 50 MG: 10 INJECTION, EMULSION INTRAVENOUS at 07:06

## 2025-06-02 RX ADMIN — PROPOFOL 10 MG: 10 INJECTION, EMULSION INTRAVENOUS at 07:06

## 2025-06-02 RX ADMIN — FENTANYL CITRATE 25 MCG: 50 INJECTION, SOLUTION INTRAMUSCULAR; INTRAVENOUS at 07:06

## 2025-06-02 RX ADMIN — KETOROLAC TROMETHAMINE 15 MG: 30 INJECTION, SOLUTION INTRAMUSCULAR at 08:06

## 2025-06-02 RX ADMIN — SODIUM CHLORIDE, POTASSIUM CHLORIDE, SODIUM LACTATE AND CALCIUM CHLORIDE: 600; 310; 30; 20 INJECTION, SOLUTION INTRAVENOUS at 06:06

## 2025-06-02 RX ADMIN — LIDOCAINE HYDROCHLORIDE 100 MG: 20 INJECTION INTRAVENOUS at 07:06

## 2025-06-02 RX ADMIN — VANCOMYCIN HYDROCHLORIDE 1000 MG: 1 INJECTION, POWDER, LYOPHILIZED, FOR SOLUTION INTRAVENOUS at 07:06

## 2025-06-03 LAB
ACID FAST MOD KINY STN SPEC: NORMAL
ESTROGEN SERPL-MCNC: NORMAL PG/ML
INSULIN SERPL-ACNC: NORMAL U[IU]/ML
LAB AP CLINICAL INFORMATION: NORMAL
LAB AP GROSS DESCRIPTION: NORMAL
LAB AP PERFORMING LOCATION(S): NORMAL
LAB AP REPORT FOOTNOTES: NORMAL

## 2025-06-06 LAB
BACTERIA SPEC AEROBE CULT: NO GROWTH
BACTERIA SPEC ANAEROBE CULT: NORMAL

## 2025-06-10 ENCOUNTER — OFFICE VISIT (OUTPATIENT)
Dept: PRIMARY CARE CLINIC | Facility: CLINIC | Age: OVER 89
End: 2025-06-10
Payer: MEDICARE

## 2025-06-10 VITALS
WEIGHT: 165.81 LBS | BODY MASS INDEX: 21.98 KG/M2 | RESPIRATION RATE: 18 BRPM | DIASTOLIC BLOOD PRESSURE: 60 MMHG | OXYGEN SATURATION: 97 % | HEART RATE: 62 BPM | SYSTOLIC BLOOD PRESSURE: 122 MMHG | TEMPERATURE: 98 F | HEIGHT: 73 IN

## 2025-06-10 DIAGNOSIS — L89.152 DECUBITUS ULCER OF SACRAL REGION, STAGE 2: Primary | ICD-10-CM

## 2025-06-10 PROCEDURE — 1101F PT FALLS ASSESS-DOCD LE1/YR: CPT | Mod: CPTII,S$GLB,, | Performed by: PHYSICIAN ASSISTANT

## 2025-06-10 PROCEDURE — 99213 OFFICE O/P EST LOW 20 MIN: CPT | Mod: S$GLB,,, | Performed by: PHYSICIAN ASSISTANT

## 2025-06-10 PROCEDURE — 3288F FALL RISK ASSESSMENT DOCD: CPT | Mod: CPTII,S$GLB,, | Performed by: PHYSICIAN ASSISTANT

## 2025-06-10 PROCEDURE — 1126F AMNT PAIN NOTED NONE PRSNT: CPT | Mod: CPTII,S$GLB,, | Performed by: PHYSICIAN ASSISTANT

## 2025-06-10 PROCEDURE — 1159F MED LIST DOCD IN RCRD: CPT | Mod: CPTII,S$GLB,, | Performed by: PHYSICIAN ASSISTANT

## 2025-06-10 PROCEDURE — 99999 PR PBB SHADOW E&M-EST. PATIENT-LVL III: CPT | Mod: PBBFAC,,, | Performed by: PHYSICIAN ASSISTANT

## 2025-06-12 ENCOUNTER — PATIENT MESSAGE (OUTPATIENT)
Dept: PRIMARY CARE CLINIC | Facility: CLINIC | Age: OVER 89
End: 2025-06-12
Payer: MEDICARE

## 2025-06-15 ENCOUNTER — PATIENT MESSAGE (OUTPATIENT)
Dept: NEUROSURGERY | Facility: CLINIC | Age: OVER 89
End: 2025-06-15
Payer: MEDICARE

## 2025-06-16 ENCOUNTER — TELEPHONE (OUTPATIENT)
Dept: NEUROSURGERY | Facility: CLINIC | Age: OVER 89
End: 2025-06-16
Payer: MEDICARE

## 2025-06-16 ENCOUNTER — PATIENT MESSAGE (OUTPATIENT)
Dept: NEUROSURGERY | Facility: CLINIC | Age: OVER 89
End: 2025-06-16
Payer: MEDICARE

## 2025-06-16 NOTE — TELEPHONE ENCOUNTER
Spoke with patienst daughter this am who advised pt would like to be seen sooner. We beth PO for 06/17 . Pt later cancelled appt advising they did not want to drive to [facility]. I advised pts daughter to send pic of incision.

## 2025-06-19 ENCOUNTER — TELEPHONE (OUTPATIENT)
Dept: NEUROSURGERY | Facility: CLINIC | Age: OVER 89
End: 2025-06-19
Payer: MEDICARE

## 2025-06-19 NOTE — TELEPHONE ENCOUNTER
I called the patient's daughter regarding this appointment due to the provider wanting to see this patient Friday due to infection. She stated, that she would need to call me back due to an emergency her wife is in the hospital due to a stroke.

## 2025-06-20 ENCOUNTER — TELEPHONE (OUTPATIENT)
Dept: NEUROSURGERY | Facility: CLINIC | Age: OVER 89
End: 2025-06-20
Payer: MEDICARE

## 2025-06-20 DIAGNOSIS — M54.6 PAIN IN THORACIC SPINE: Primary | ICD-10-CM

## 2025-06-20 DIAGNOSIS — M54.6 PAIN IN THORACIC SPINE: ICD-10-CM

## 2025-06-20 DIAGNOSIS — M54.10 BACK PAIN WITH RADICULOPATHY: Primary | ICD-10-CM

## 2025-06-20 RX ORDER — CLINDAMYCIN HYDROCHLORIDE 300 MG/1
300 CAPSULE ORAL EVERY 8 HOURS
Qty: 21 CAPSULE | Refills: 0 | Status: SHIPPED | OUTPATIENT
Start: 2025-06-20 | End: 2025-06-27

## 2025-06-20 NOTE — TELEPHONE ENCOUNTER
I spoke with the patient and his daughter Clarita, who have been informed that the provider would like that he comes in for post op ASAP due to potential infection. He states that he was unable to come today due to his daughter room mate having a stroke and she being his only transportation.   Per Dr. Winslow and Odette Neff, BRETT pt needs to come in next week for labs, CT and follow-up. The pt has been scheduled for next week, which he states that Wednesday or Thursday would work best. He has been scheduled for labs & CT in [place], 06/26 at 9:30 AM and CT at 10 AM and F/U with Odette at 2:20 PM. The pt and daughter Clarita confirmed appointment date and time. They are also aware that Odette will be sending in a RX for antibiotics.

## 2025-06-23 ENCOUNTER — TELEPHONE (OUTPATIENT)
Dept: NEUROSURGERY | Facility: CLINIC | Age: OVER 89
End: 2025-06-23
Payer: MEDICARE

## 2025-06-25 NOTE — PROGRESS NOTES
"    [facility]       [address] [place] [address]      Phone: [phone]      Fax: [phone]    Theresa Hand PA-C                Office Visit  06/25/2025        Subjective      HPI:  Claude L Taylor is a 89 y.o. male presents today in clinic for "Wound Check (butt)  ."     CHIEF COMPLAINT:  Patient presents with a persistent skin irritation and breakdown on his sacrum that has been present for 6-8 months.    Patient is a accompanied by his daughter today in clinic    HPI:  Patient reports a skin irritation and breakdown on his sacrum that has been present for 6-8 months. The affected area is red, irritated, and painful, extending from the top of his sacrum down to his anus, with the most significant breakdown at the top portion. He reports severe pain and discomfort when sitting. Various medications and treatments, including a cream from a specialized pharmacy in [place] called [facility], have not provided relief and may have further irritated the area. He uses a pillow with a cut-out in the middle while sitting in his recliner to alleviate pressure.    Patient recently had back surgery, which has limited his mobility and may have exacerbated the condition. Prior to the surgery, he was active, engaging in activities such as weeding, tending to jaye beds, and cutting grass. The skin breakdown has remained relatively unchanged since March. He uses a donut cushion for sitting and has tried padded bandages to manage the irritation. He sleeps on his side at night to avoid putting pressure on the affected area.    Patient describes the discomfort as more of an irritation than severe pain, rating it as no more than 4 out of 10 when sitting. He takes Norco for his back pain and has previously been prescribed tramadol, which he reports helps somewhat with the discomfort. He also has a small area of breakdown on his leg that has been present for a few " months but is not painful.    He denies shortness of breath.    Wound Check              The following were updated and reviewed by myself in the chart: medications, past medical history, past surgical history, family history, social history, and allergies.     Medications:  Medications Ordered Prior to Encounter[1]     PMHx:  Past Medical History:   Diagnosis Date    *Atrial fibrillation     pacer Dr Barrios    Anticoagulant long-term use     Cancer     skin    Cervical neck pain with evidence of disc disease     Colon polyps 12/14/2015    Coronary artery disease     Depression     Diverticulosis     Hypertension     Kidney stone     Skin cancer       Patient Active Problem List    Diagnosis Date Noted    Positive SHOAIB (antinuclear antibody) 05/02/2022    Keratoconjunctivitis sicca due to decreased tear production, bilateral 05/02/2022    Primary osteoarthritis involving multiple joints 05/02/2022    Dependence on other enabling machines and devices 11/09/2021    Atrial fibrillation, chronic 05/03/2021    Osteoporosis 05/03/2021    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    SOB (shortness of breath) on exertion 03/27/2019    Benign prostatic hyperplasia with weak urinary stream 03/27/2019    Cardiac pacemaker 07/31/2018    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    Nonrheumatic mitral (valve) insufficiency 07/31/2018    Peripheral vascular disease 07/31/2018    Hx of CABG 05/23/2018    Spinal stenosis of lumbar region at multiple levels 05/23/2018    DDD (degenerative disc disease), lumbosacral 05/23/2018    Proximal muscle weakness 05/23/2018    OAB (overactive bladder) 05/23/2018    Coronary artery disease involving native coronary artery of native heart without angina pectoris 10/05/2017    Permanent atrial fibrillation 10/05/2017    Anticoagulated on Coumadin 10/05/2017    B12 deficiency due to diet 10/05/2017    Primary osteoarthritis of both knees 07/14/2017    Chronic constipation 09/26/2016    Anismus 12/14/2015     Diverticulosis of large intestine without hemorrhage 12/14/2015    Altered bowel habits 12/13/2015    Chronic back pain 09/15/2015    Osteoarthritis 11/18/2014    Long term (current) use of anticoagulants 11/06/2014    Asthma in adult 01/14/2014    Depression         PSHx:  Past Surgical History:   Procedure Laterality Date    back fusion      CARDIAC PACEMAKER PLACEMENT      CARDIAC VALVE REPLACEMENT      CHOLECYSTECTOMY      COLONOSCOPY  03/22/2013    COLONOSCOPY N/A 12/14/2015    Procedure: COLONOSCOPY;  Surgeon: Gonzalo Gorman MD;  Location: [facility] ENDO;  Service: Endoscopy;  Laterality: N/A;    CORONARY ARTERY BYPASS GRAFT      ELBOW BURSA SURGERY      left    EYE SURGERY      FOOT SURGERY      INCISION AND DRAINAGE OF BACK N/A 6/2/2025    Procedure: INCISION AND DRAINAGE, BACK;  Surgeon: Zach Winslow MD;  Location: [facility] OR;  Service: Neurosurgery;  Laterality: N/A;  I&D Thoracic, possible SCS removal.    MITRAL VALVE REPLACEMENT      REPLACEMENT OF PACEMAKER  09/2019    SHOULDER SURGERY      SKIN CANCER EXCISION  01/2018/ 04/2018 Dr.Thorla Jones    lower left leg     SPINAL CORD STIMULATOR REMOVAL N/A 12/9/2024    Procedure: REMOVAL, NEUROSTIMULATOR, SPINAL;  Surgeon: Moises Rosa MD;  Location: [facility] OR;  Service: Pain Management;  Laterality: N/A;    SPINE SURGERY  3 surgeries    TONSILLECTOMY          FHx:  Family History   Problem Relation Name Age of Onset    Cancer Father Claude Lee Taylor     Arthritis Father Claude Lee Taylor     Colon cancer Neg Hx          Social:  Social History     Socioeconomic History    Marital status:      Spouse name: marguerite Barron   Tobacco Use    Smoking status: Never    Smokeless tobacco: Never    Tobacco comments:     never a smoker   Substance and Sexual Activity    Alcohol use: Not Currently     Alcohol/week: 2.0 standard drinks of alcohol     Types: 1 Glasses of wine, 1 Cans of beer per week     Comment: drinks above only with meals ocassionaly     "Drug use: Never    Sexual activity: Not Currently     Partners: Female     Comment: too old at 89     Social Drivers of Health     Financial Resource Strain: Low Risk  (11/12/2024)    Overall Financial Resource Strain (CARDIA)     Difficulty of Paying Living Expenses: Not hard at all   Food Insecurity: No Food Insecurity (11/12/2024)    Hunger Vital Sign     Worried About Running Out of Food in the Last Year: Never true     Ran Out of Food in the Last Year: Never true   Physical Activity: Insufficiently Active (11/12/2024)    Exercise Vital Sign     Days of Exercise per Week: 3 days     Minutes of Exercise per Session: 10 min   Housing Stability: Unknown (11/12/2024)    Housing Stability Vital Sign     Unable to Pay for Housing in the Last Year: No        Allergies:  Review of patient's allergies indicates:   Allergen Reactions    Oxycodone Hallucinations     "it's mental/emotional        ROS:  Review of Systems   Constitutional:  Negative for activity change, appetite change and unexpected weight change.   HENT:  Negative for hearing loss and trouble swallowing.    Respiratory:  Negative for shortness of breath.    Cardiovascular:  Negative for chest pain.   Gastrointestinal:  Negative for abdominal pain, constipation and diarrhea.   Genitourinary:  Negative for difficulty urinating.   Musculoskeletal:  Negative for arthralgias and myalgias.   Skin:  Positive for wound. Negative for rash.   Neurological:  Negative for dizziness, tremors, seizures, speech difficulty, weakness, light-headedness, numbness and headaches.          Objective      /60 (BP Location: Right arm, Patient Position: Sitting)   Pulse 62   Temp 98.1 °F (36.7 °C) (Oral)   Resp 18   Ht 6' 1" (1.854 m)   Wt 75.2 kg (165 lb 12.6 oz)   SpO2 97%   BMI 21.87 kg/m²   Ht Readings from Last 3 Encounters:   06/10/25 6' 1" (1.854 m)   06/02/25 6' 1" (1.854 m)   05/15/25 5' 9" (1.753 m)     Wt Readings from Last 3 Encounters:   06/10/25 75.2 kg " (165 lb 12.6 oz)   06/02/25 72.7 kg (160 lb 4.4 oz)   05/15/25 70.2 kg (154 lb 12.2 oz)       PHYSICAL EXAM:  Physical Exam  Constitutional:       General: He is not in acute distress.     Appearance: Normal appearance. He is not ill-appearing.   HENT:      Head: Normocephalic and atraumatic.      Nose: Nose normal.      Mouth/Throat:      Mouth: Mucous membranes are moist.   Eyes:      Extraocular Movements: Extraocular movements intact.      Pupils: Pupils are equal, round, and reactive to light.   Cardiovascular:      Rate and Rhythm: Normal rate and regular rhythm.      Pulses: Normal pulses.      Heart sounds: Normal heart sounds.   Pulmonary:      Effort: Pulmonary effort is normal. No respiratory distress.      Breath sounds: Normal breath sounds.   Abdominal:      General: Abdomen is flat. Bowel sounds are normal.      Tenderness: There is no abdominal tenderness.   Genitourinary:     Comments: Approximately stage II pressure ulcer in the sacral region.  No signs of secondary infection.  No drainage.  Musculoskeletal:         General: Normal range of motion.      Cervical back: Normal range of motion.      Right lower leg: No edema.      Left lower leg: No edema.   Skin:     General: Skin is warm and dry.      Findings: No rash.   Neurological:      General: No focal deficit present.      Mental Status: He is alert and oriented to person, place, and time.   Psychiatric:         Mood and Affect: Mood normal.         Thought Content: Thought content normal.            Assessment    1. Decubitus ulcer of sacral region, stage 2          Plan Claude was seen today for wound check.    Diagnoses and all orders for this visit:    Decubitus ulcer of sacral region, stage 2       ## PRESSURE ULCER OF SACRAL REGION:   Assessed small sacral pressure ulcer present for 6-8 months with very small amount of skin breakdown causing discomfort when sitting.   The ulcer has not changed since March and causes irritation and  discomfort rather than severe pain.   Discussed the importance of offloading pressure on sacrum to prevent worsening and explained potential risks if left untreated.   Recommend use of padded cushion or donut pillow when sitting, continuing to sleep on sides rather than back, and getting up periodically to relieve pressure.   Planned consultation with a wound care specialist, potentially for home visits    Referral was placed to wound care company       I spent a total of 20 minutes face to face and non-face to face on the date of this visit.This includes time preparing to see the patient (eg, review of tests, notes), obtaining and/or reviewing additional history from an independent historian and/or outside medical records, documenting clinical information in the electronic health record, independently interpreting results and/or communicating results to the patient/family/caregiver, or care coordinator.  Visit today included increased complexity associated with the care of the episodic problem addressed and managing the longitudinal care of the patient due to the serious and/or complex managed problem(s).      Signed by:        Theresa Hand PA-C      This note was generated with the assistance of ambient listening technology. Verbal consent was obtained by the patient and accompanying visitor(s) for the recording of patient appointment to facilitate this note. I attest to having reviewed and edited the generated note for accuracy, though some syntax or spelling errors may persist. Please contact the author of this note for any clarification.         [1]   Current Outpatient Medications on File Prior to Visit   Medication Sig Dispense Refill    celecoxib (CELEBREX) 50 MG capsule Take 1 capsule (50 mg total) by mouth 2 (two) times daily. 180 capsule 3    finasteride (PROSCAR) 5 mg tablet Take 1 tablet (5 mg total) by mouth once daily. 30 tablet 11    fluorometholone 0.1% (FML) 0.1 % DrpS Place 1 drop in both eyes  twice daily 5 mL 5    furosemide (LASIX) 20 MG tablet Take 1 tablet by mouth as needed. 180 tablet 3    methocarbamoL (ROBAXIN) 750 MG Tab Take 1 tablet (750 mg total) by mouth 3 (three) times daily as needed (muscle spasms). 60 tablet 0    prednisoLONE acetate (PRED FORTE) 1 % DrpS INSTILL 1 DROP INTO BOTH EYES TWICE A DAY FOR 2 WEEKS THEN 1 DROP INTO BOTH EYES ONCE A DAY      QUEtiapine (SEROQUEL) 25 MG Tab TAKE 1 TABLET BY MOUTH NIGHTLY AS NEEDED FOR SLEEP 90 tablet 3    tamsulosin (FLOMAX) 0.4 mg Cap TAKE 2 CAPSULES BY MOUTH IN THE EVENING FOR PROSTATE AND  URINE  FLOW 180 capsule 3    triamcinolone acetonide 0.025% (KENALOG) 0.025 % cream Apply topically 2 (two) times daily.      warfarin (COUMADIN) 5 MG tablet TAKE 1 TABLET BY MOUTH ONCE DAILY AS DIRECTED BY  COUMADIN  CLINIC (Patient taking differently: 5 mg 4 days a week   3.5 mg 3 days a week) 90 tablet 3     No current facility-administered medications on file prior to visit.

## 2025-06-26 ENCOUNTER — HOSPITAL ENCOUNTER (OUTPATIENT)
Dept: RADIOLOGY | Facility: HOSPITAL | Age: OVER 89
Discharge: HOME | End: 2025-06-26
Attending: NEUROLOGICAL SURGERY
Payer: MEDICARE

## 2025-06-26 ENCOUNTER — OFFICE VISIT (OUTPATIENT)
Dept: NEUROSURGERY | Facility: CLINIC | Age: OVER 89
End: 2025-06-26
Payer: MEDICARE

## 2025-06-26 VITALS — SYSTOLIC BLOOD PRESSURE: 134 MMHG | DIASTOLIC BLOOD PRESSURE: 62 MMHG | HEART RATE: 62 BPM

## 2025-06-26 DIAGNOSIS — T85.733S INFECTION OF SPINAL CORD STIMULATOR, SEQUELA: Primary | ICD-10-CM

## 2025-06-26 DIAGNOSIS — M54.6 PAIN IN THORACIC SPINE: ICD-10-CM

## 2025-06-26 DIAGNOSIS — M54.10 BACK PAIN WITH RADICULOPATHY: ICD-10-CM

## 2025-06-26 PROCEDURE — 72130 CT CHEST SPINE W/O & W/DYE: CPT | Mod: TC,PN

## 2025-06-26 PROCEDURE — 72130 CT CHEST SPINE W/O & W/DYE: CPT | Mod: 26,,, | Performed by: RADIOLOGY

## 2025-06-26 PROCEDURE — 72133 CT LUMBAR SPINE W/O & W/DYE: CPT | Mod: TC,PN

## 2025-06-26 PROCEDURE — 25500020 PHARM REV CODE 255: Mod: PN | Performed by: NEUROLOGICAL SURGERY

## 2025-06-26 PROCEDURE — 72133 CT LUMBAR SPINE W/O & W/DYE: CPT | Mod: 26,,, | Performed by: RADIOLOGY

## 2025-06-26 PROCEDURE — 99999 PR PBB SHADOW E&M-EST. PATIENT-LVL III: CPT | Mod: PBBFAC,,, | Performed by: PHYSICIAN ASSISTANT

## 2025-06-26 RX ORDER — CLINDAMYCIN HYDROCHLORIDE 300 MG/1
300 CAPSULE ORAL EVERY 8 HOURS
Qty: 9 CAPSULE | Refills: 0 | Status: SHIPPED | OUTPATIENT
Start: 2025-06-26 | End: 2025-06-29

## 2025-06-26 RX ADMIN — IOHEXOL 100 ML: 350 INJECTION, SOLUTION INTRAVENOUS at 09:06

## 2025-06-26 NOTE — PROGRESS NOTES
Patient presents for post op visit s/p I/D of thoracic and lumbar region. He reports clinamycin is helping, but continues to be able to express drainage from lumbar region incision . IT does not drain on its own. Has about 5 more days of ABX left. When complete he will have finished 10 days clindamycin. His CT T and L spine reviewed. Concern for small fluid collection near L1 spinous process which correlates to delayed wound healing.       Patient accompanied by his son who states otherwise doing okay.       Patient has back pain specifically when lying flat or putting his back against chair/ wall.      He is ambulatory with RW/ cane      No fevers, chills, n/v or focal weakness             Pertinent positive and negative ROS documented above in HPI, all other systems reviewed and found to be negative.             Physical Exam:  Nursing note and vitals reviewed.     Constitutional: She appears well-nourished. She is not diaphoretic. No distress.      Eyes: Pupils are equal, round, and reactive to light. EOM are normal.      Cardiovascular: Normal rate and regular rhythm.      Psych/Behavior: She is alert. She is oriented to person, place, and time. She has a normal mood and affect.      Musculoskeletal:        Back: Range of motion is limited. There is tenderness. Muscle strength is 5/5.       Right Lower Extremities: Range of motion is full. There is no tenderness. Muscle strength is 5/5. Tone is normal.        Left Lower Extremities: There is no tenderness. Muscle strength is 5/5. Tone is normal.      Neurological:        Sensory: There is no sensory deficit in the trunk. There is no sensory deficit in the extremities.        Cranial nerves: Cranial nerve(s) II, III, IV, V, VI, VII, VIII, IX, X, XI and XII are intact.      General     Nursing note and vitals reviewed.  Constitutional: She is oriented to person, place, and time. She appears well-nourished. No distress.   Eyes: EOM are normal. Pupils are equal,  round, and reactive to light.   Cardiovascular:  Normal rate and regular rhythm.            Neurological: She is alert and oriented to person, place, and time.   Psychiatric: She has a normal mood and affect.          Incisions are CDI no persistent drainage. Able to express tiny area of white discharge. Reviewed CT T and L Spine.    I advised we give a few more days of abx and will determine best plan next week. May require plastic surgery to assist with closure If abx fail.      Patient voiced understanding and in agreement with plan of care does not wish to undergo another procedure unless necessary if pain continues and wound does not heal we will discuss next steps.    I advised on signs and symptoms that prompt urgent medical attention.         Attestation:  IOdette PA-C have obtained HPI, performed Physical Examination on the above patient, reviewed the pertinent labs, tests, imaging, other relevant data and recorded my findings in this clinic note.      This note was produced using dictation software of voice recognition technology, and some typographical errors may be present.

## 2025-07-15 ENCOUNTER — PATIENT MESSAGE (OUTPATIENT)
Dept: SURGERY | Facility: HOSPITAL | Age: OVER 89
End: 2025-07-15
Payer: MEDICARE

## (undated) DEVICE — APPLICATOR CHLORAPREP ORN 26ML

## (undated) DEVICE — MANIFOLD 4 PORT

## (undated) DEVICE — MARKER SKIN RULER STERILE

## (undated) DEVICE — DRAPE C-ARMOR EQUIPMENT COVER

## (undated) DEVICE — REMOVER LOTION

## (undated) DEVICE — DRAPE INVISISHIELD TWL 18X24IN

## (undated) DEVICE — GLOVE SIGNATURE ESSNTL LTX 8

## (undated) DEVICE — DRESSING TRANS 4X4 TEGADERM

## (undated) DEVICE — DRESSING ADH SQUARE 8X8CM

## (undated) DEVICE — GOWN POLY REINF BRTH SLV XL

## (undated) DEVICE — DRAPE MOBILE C-ARM

## (undated) DEVICE — COVER PROXIMA MAYO STAND

## (undated) DEVICE — BINDER ABDOMINIAL 14X14-54

## (undated) DEVICE — SYR ONLY LUER LOCK 20CC

## (undated) DEVICE — DRAPE THREE-QTR REINF 53X77IN

## (undated) DEVICE — TOWEL OR DISP STRL BLUE 4/PK

## (undated) DEVICE — BLADE EZ CLEAN 2.5IN MODIFIED

## (undated) DEVICE — SUT VICRYL PLUS 2-0 CT1 18

## (undated) DEVICE — ELECTRODE REM PLYHSV RETURN 9

## (undated) DEVICE — SPONGE COTTON TRAY 4X4IN

## (undated) DEVICE — NDL ECLIPSE SAFETY 23G 1.5IN

## (undated) DEVICE — DURAPREP SURG SCRUB 26ML

## (undated) DEVICE — TRAY SKIN SCRUB WET 4 COMPART

## (undated) DEVICE — GLOVE SURGICAL LATEX SZ 6.5

## (undated) DEVICE — CORD BIPOLAR 12 FOOT

## (undated) DEVICE — TUBING MEDI-VAC 20FT .25IN

## (undated) DEVICE — TOOL MR8 MATCH HEAD 14CM 3MM

## (undated) DEVICE — SYR B-D DISP CONTROL 10CC100/C

## (undated) DEVICE — GLOVE SENSICARE PI GRN 8

## (undated) DEVICE — HEADREST PRONESAFE DERMAPROX

## (undated) DEVICE — SYR 10CC LUER LOCK

## (undated) DEVICE — PACK BASIC SETUP SC BR

## (undated) DEVICE — SCRUB DYNA-HEX LIQ 4% CHG 4OZ

## (undated) DEVICE — DRAPE CORETEMP FLD WRM 56X62IN

## (undated) DEVICE — ALCOHOL 70% ANTISEPTIC ISO 4OZ

## (undated) DEVICE — ADHESIVE DERMABOND ADVANCED

## (undated) DEVICE — NDL ECLIPSE SAF REG 25GX1.5IN

## (undated) DEVICE — TUBE SPEC COLL&TRANSPORT 11ML

## (undated) DEVICE — Device

## (undated) DEVICE — COVER LIGHT HANDLE 80/CA

## (undated) DEVICE — DRAPE INCISE IOBAN 2 23X17IN

## (undated) DEVICE — SWAB CULTURETTE II DUAL

## (undated) DEVICE — NDL SPINAL SPINOCAN 22GX3.5

## (undated) DEVICE — STAPLER SKIN PROXIMATE WIDE

## (undated) DEVICE — SUT PROLENE 2-0 FSLX BL

## (undated) DEVICE — ADHESIVE MASTISOL VIAL 48/BX

## (undated) DEVICE — NDL ECLIPSE SAFETY 18GX1-1/2IN

## (undated) DEVICE — DRESSING AQUACEL AG ADV 3.5X6

## (undated) DEVICE — POUCH INSTRUMENT 2 POCKET

## (undated) DEVICE — SPONGE PATTY SURGICAL .5X3IN

## (undated) DEVICE — DRESSING SURGICAL 1/2X1/2

## (undated) DEVICE — CONTAINER SPECIMEN OR STER 4OZ

## (undated) DEVICE — SUT VICRYL PLUS 0 CT1 18IN

## (undated) DEVICE — DRAPE LAP T SHT W/ INSTR PAD

## (undated) DEVICE — SUT PROLENE 2-0 30 SH

## (undated) DEVICE — LOTION DURA PREP REMOVER 40Z

## (undated) DEVICE — KIT EVACUATOR 3-SPRING 1/8 DRN

## (undated) DEVICE — SYR LUER LOCK STERILE 10ML

## (undated) DEVICE — TRAY CATH 1-LYR URIMTR 16FR